# Patient Record
Sex: MALE | Race: WHITE | Employment: OTHER | URBAN - METROPOLITAN AREA
[De-identification: names, ages, dates, MRNs, and addresses within clinical notes are randomized per-mention and may not be internally consistent; named-entity substitution may affect disease eponyms.]

---

## 2017-01-03 PROCEDURE — 81003 URINALYSIS AUTO W/O SCOPE: CPT | Performed by: INTERNAL MEDICINE

## 2017-04-28 ENCOUNTER — OFFICE VISIT (OUTPATIENT)
Dept: FAMILY MEDICINE CLINIC | Facility: CLINIC | Age: 72
End: 2017-04-28

## 2017-04-28 VITALS
HEIGHT: 70 IN | WEIGHT: 213 LBS | OXYGEN SATURATION: 98 % | HEART RATE: 68 BPM | BODY MASS INDEX: 30.49 KG/M2 | DIASTOLIC BLOOD PRESSURE: 68 MMHG | TEMPERATURE: 98 F | SYSTOLIC BLOOD PRESSURE: 122 MMHG | RESPIRATION RATE: 17 BRPM

## 2017-04-28 DIAGNOSIS — R05.9 COUGH: ICD-10-CM

## 2017-04-28 DIAGNOSIS — J01.00 ACUTE NON-RECURRENT MAXILLARY SINUSITIS: Primary | ICD-10-CM

## 2017-04-28 PROCEDURE — 99202 OFFICE O/P NEW SF 15 MIN: CPT | Performed by: FAMILY MEDICINE

## 2017-04-28 RX ORDER — PROMETHAZINE HYDROCHLORIDE AND CODEINE PHOSPHATE 6.25; 1 MG/5ML; MG/5ML
5 SYRUP ORAL EVERY 4 HOURS PRN
Qty: 120 ML | Refills: 0 | Status: SHIPPED | OUTPATIENT
Start: 2017-04-28 | End: 2017-07-10 | Stop reason: ALTCHOICE

## 2017-04-28 RX ORDER — AMOXICILLIN AND CLAVULANATE POTASSIUM 875; 125 MG/1; MG/1
1 TABLET, FILM COATED ORAL 2 TIMES DAILY
Qty: 20 TABLET | Refills: 0 | Status: SHIPPED | OUTPATIENT
Start: 2017-04-28 | End: 2017-05-08

## 2017-04-28 NOTE — PATIENT INSTRUCTIONS
Take antibiotics with food and plenty of water. Eat yogurt or take probiotic daily. (Eladio Soto is a good example of an OTC probiotic)  Make sure to finish the entire antibiotic treatment.     Take promethazine with codeine syrup as needed for cough-- 1 tsp ev

## 2017-04-28 NOTE — PROGRESS NOTES
CHIEF COMPLAINT:   Patient presents with:  Cough: x 1 wk tried OTC and it is not helping       HPI:   Madalyn Sierra is a 80year old male who presents for sinus congestion and persistent cough for  1  weeks. Symptoms have been worsening since onset.  Cough GENERAL: feels well otherwise, no unplanned weight change,  normal appetite  SKIN: no rashes or abnormal skin lesions  HEENT: See HPI.     LUNGS: denies shortness of breath or wheezing, See HPI  CARDIOVASCULAR: denies chest pain or palpitations   GI: honorio probiotic)  Make sure to finish the entire antibiotic treatment. Take promethazine with codeine syrup as needed for cough-- 1 tsp every 4-6 hours. May cause sedation, so use with caution.    Consider Delsym OTC for an alternative cough suppressant that d

## 2017-07-11 ENCOUNTER — HOSPITAL ENCOUNTER (OUTPATIENT)
Dept: INTERVENTIONAL RADIOLOGY/VASCULAR | Facility: HOSPITAL | Age: 72
Discharge: HOME | End: 2017-07-12
Attending: INTERNAL MEDICINE | Admitting: INTERNAL MEDICINE
Payer: MEDICARE

## 2017-07-11 DIAGNOSIS — I10 ESSENTIAL HYPERTENSION: ICD-10-CM

## 2017-07-11 DIAGNOSIS — R94.39 ABNORMAL STRESS TEST: ICD-10-CM

## 2017-07-11 DIAGNOSIS — Z95.1 S/P CABG X 4: ICD-10-CM

## 2017-07-11 LAB
ATRIAL RATE: 65 BPM
P AXIS: 42 DEGREES
P-R INTERVAL: 190 MS
Q-T INTERVAL: 402 MS
QRS DURATION: 88 MS
QTC CALCULATION (BEZET): 418 MS
R AXIS: 20 DEGREES
T AXIS: 112 DEGREES
VENTRICULAR RATE: 65 BPM

## 2017-07-11 PROCEDURE — 027034Z DILATION OF CORONARY ARTERY, ONE ARTERY WITH DRUG-ELUTING INTRALUMINAL DEVICE, PERCUTANEOUS APPROACH: ICD-10-PCS | Performed by: INTERNAL MEDICINE

## 2017-07-11 PROCEDURE — 4A023N7 MEASUREMENT OF CARDIAC SAMPLING AND PRESSURE, LEFT HEART, PERCUTANEOUS APPROACH: ICD-10-PCS | Performed by: INTERNAL MEDICINE

## 2017-07-11 PROCEDURE — B2131ZZ FLUOROSCOPY OF MULTIPLE CORONARY ARTERY BYPASS GRAFTS USING LOW OSMOLAR CONTRAST: ICD-10-PCS | Performed by: INTERNAL MEDICINE

## 2017-07-11 PROCEDURE — 99156 MOD SED OTH PHYS/QHP 5/>YRS: CPT

## 2017-07-11 PROCEDURE — 93010 ELECTROCARDIOGRAM REPORT: CPT | Performed by: INTERNAL MEDICINE

## 2017-07-11 PROCEDURE — 99153 MOD SED SAME PHYS/QHP EA: CPT

## 2017-07-11 PROCEDURE — 93459 L HRT ART/GRFT ANGIO: CPT

## 2017-07-11 PROCEDURE — 99152 MOD SED SAME PHYS/QHP 5/>YRS: CPT

## 2017-07-11 PROCEDURE — 99157 MOD SED OTHER PHYS/QHP EA: CPT

## 2017-07-11 PROCEDURE — 93005 ELECTROCARDIOGRAM TRACING: CPT

## 2017-07-11 PROCEDURE — B2151ZZ FLUOROSCOPY OF LEFT HEART USING LOW OSMOLAR CONTRAST: ICD-10-PCS | Performed by: INTERNAL MEDICINE

## 2017-07-11 PROCEDURE — 3E033PZ INTRODUCTION OF PLATELET INHIBITOR INTO PERIPHERAL VEIN, PERCUTANEOUS APPROACH: ICD-10-PCS | Performed by: INTERNAL MEDICINE

## 2017-07-11 PROCEDURE — B2111ZZ FLUOROSCOPY OF MULTIPLE CORONARY ARTERIES USING LOW OSMOLAR CONTRAST: ICD-10-PCS | Performed by: INTERNAL MEDICINE

## 2017-07-11 PROCEDURE — B2181ZZ FLUOROSCOPY OF LEFT INTERNAL MAMMARY BYPASS GRAFT USING LOW OSMOLAR CONTRAST: ICD-10-PCS | Performed by: INTERNAL MEDICINE

## 2017-07-11 RX ORDER — LIDOCAINE HYDROCHLORIDE 10 MG/ML
INJECTION, SOLUTION INFILTRATION; PERINEURAL
Status: COMPLETED
Start: 2017-07-11 | End: 2017-07-11

## 2017-07-11 RX ORDER — SODIUM CHLORIDE 9 MG/ML
INJECTION, SOLUTION INTRAVENOUS CONTINUOUS
Status: ACTIVE | OUTPATIENT
Start: 2017-07-11 | End: 2017-07-11

## 2017-07-11 RX ORDER — ASPIRIN 81 MG/1
81 TABLET ORAL DAILY
Status: DISCONTINUED | OUTPATIENT
Start: 2017-07-12 | End: 2017-07-12

## 2017-07-11 RX ORDER — MIDAZOLAM HYDROCHLORIDE 1 MG/ML
INJECTION INTRAMUSCULAR; INTRAVENOUS
Status: COMPLETED
Start: 2017-07-11 | End: 2017-07-11

## 2017-07-11 RX ORDER — HEPARIN SODIUM 5000 [USP'U]/ML
INJECTION, SOLUTION INTRAVENOUS; SUBCUTANEOUS
Status: COMPLETED
Start: 2017-07-11 | End: 2017-07-11

## 2017-07-11 RX ORDER — SODIUM CHLORIDE 9 MG/ML
INJECTION, SOLUTION INTRAVENOUS CONTINUOUS
Status: DISCONTINUED | OUTPATIENT
Start: 2017-07-11 | End: 2017-07-11

## 2017-07-11 RX ORDER — ATORVASTATIN CALCIUM 20 MG/1
20 TABLET, FILM COATED ORAL NIGHTLY
Status: DISCONTINUED | OUTPATIENT
Start: 2017-07-11 | End: 2017-07-12

## 2017-07-11 RX ORDER — ACETAMINOPHEN 325 MG/1
650 TABLET ORAL EVERY 6 HOURS PRN
Status: DISCONTINUED | OUTPATIENT
Start: 2017-07-11 | End: 2017-07-12

## 2017-07-11 RX ORDER — ASPIRIN 81 MG/1
TABLET, CHEWABLE ORAL
Status: COMPLETED
Start: 2017-07-11 | End: 2017-07-11

## 2017-07-11 RX ORDER — METOPROLOL SUCCINATE 50 MG/1
50 TABLET, EXTENDED RELEASE ORAL
Status: DISCONTINUED | OUTPATIENT
Start: 2017-07-11 | End: 2017-07-12

## 2017-07-11 RX ORDER — CLOPIDOGREL BISULFATE 75 MG/1
TABLET ORAL
Status: COMPLETED
Start: 2017-07-11 | End: 2017-07-11

## 2017-07-11 RX ORDER — MAGNESIUM OXIDE 400 MG (241.3 MG MAGNESIUM) TABLET
1 TABLET NIGHTLY
Status: DISCONTINUED | OUTPATIENT
Start: 2017-07-11 | End: 2017-07-12

## 2017-07-11 RX ADMIN — SODIUM CHLORIDE: 9 INJECTION, SOLUTION INTRAVENOUS at 18:09:00

## 2017-07-11 RX ADMIN — SODIUM CHLORIDE: 9 INJECTION, SOLUTION INTRAVENOUS at 13:46:00

## 2017-07-11 RX ADMIN — SODIUM CHLORIDE: 9 INJECTION, SOLUTION INTRAVENOUS at 09:00:00

## 2017-07-11 RX ADMIN — SODIUM CHLORIDE: 9 INJECTION, SOLUTION INTRAVENOUS at 11:45:00

## 2017-07-11 RX ADMIN — MAGNESIUM OXIDE 400 MG (241.3 MG MAGNESIUM) TABLET 1 MG: TABLET at 21:10:00

## 2017-07-11 RX ADMIN — ATORVASTATIN CALCIUM 20 MG: 20 TABLET, FILM COATED ORAL at 21:10:00

## 2017-07-11 RX ADMIN — SODIUM CHLORIDE: 9 INJECTION, SOLUTION INTRAVENOUS at 16:02:00

## 2017-07-11 RX ADMIN — ACETAMINOPHEN 650 MG: 325 TABLET ORAL at 21:15:00

## 2017-07-11 NOTE — PROCEDURES
[facility]    PATIENT'S NAME: Sun Sal   ATTENDING PHYSICIAN: Hiren Walsh M.D. OPERATING PHYSICIAN: Hiren Walsh M.D.    PATIENT ACCOUNT#:   [de-identified]    LOCATION:  [address] [place] [address]  MEDICAL RECORD #:   [id number]       DATE OF MAGNUS anastomosis. 7.   Saphenous vein graft to the left circumflex obtuse marginal.  Again, at the origin of this graft, there appears to be a moderate to high-grade disease. The anastomosis looks pristine with retrograde filling of the circumflex.   There is Dictated By Tano Ardon M.D.  d: 07/11/2017 10:55:37  t: 07/11/2017 12:58:10  Job [id number]  [place]/

## 2017-07-11 NOTE — PROCEDURES
[facility]    PATIENT'S NAME: Trina Caitie   ATTENDING PHYSICIAN: Miguel Beyer M.D. OPERATING PHYSICIAN: Brandi Blancas M.D.    PATIENT ACCOUNT#:   [de-identified]    LOCATION:  [address] [place] [address]  MEDICAL RECORD #:   [id number]       DATE OF

## 2017-07-11 NOTE — DIETARY NOTE
Nutrition Short Note   Dietitian consult received per cardiac rehab standing order. Pt to be educated by cardiac rehab staff and encouraged to attend outpatient classes taught by RD. RD available PRN.      Gavino Pedraza RD, LDN  Pager [phone]

## 2017-07-11 NOTE — PROCEDURES
%  [facility]      Procedure:  DEUCE RCA SVG, ANGIOMAX, PERCLOSE RFA          RESULTS: PTCA/STENT RCA SVG, PROX, , 90%% TO 0% WITH 4.0 X 22 MM JENNIFER DEUCE    PLAN: PCI OM SVG IN 2 WEEKS

## 2017-07-12 VITALS
RESPIRATION RATE: 20 BRPM | SYSTOLIC BLOOD PRESSURE: 121 MMHG | WEIGHT: 205 LBS | TEMPERATURE: 98 F | HEART RATE: 77 BPM | BODY MASS INDEX: 29.35 KG/M2 | HEIGHT: 70 IN | DIASTOLIC BLOOD PRESSURE: 77 MMHG | OXYGEN SATURATION: 95 %

## 2017-07-12 LAB
ATRIAL RATE: 68 BPM
BASOPHILS # BLD AUTO: 0.01 X10(3) UL (ref 0–0.1)
BASOPHILS NFR BLD AUTO: 0.2 %
BUN BLD-MCNC: 20 MG/DL (ref 8–20)
CALCIUM BLD-MCNC: 8.3 MG/DL (ref 8.3–10.3)
CHLORIDE: 109 MMOL/L (ref 101–111)
CO2: 25 MMOL/L (ref 22–32)
CREAT BLD-MCNC: 1.02 MG/DL (ref 0.7–1.3)
EOSINOPHIL # BLD AUTO: 0.18 X10(3) UL (ref 0–0.3)
EOSINOPHIL NFR BLD AUTO: 3.3 %
ERYTHROCYTE [DISTWIDTH] IN BLOOD BY AUTOMATED COUNT: 16.1 % (ref 11.5–16)
GLUCOSE BLD-MCNC: 107 MG/DL (ref 70–99)
HCT VFR BLD AUTO: 34.8 % (ref 37–53)
HGB BLD-MCNC: 10.3 G/DL (ref 13–17)
IMMATURE GRANULOCYTE COUNT: 0.01 X10(3) UL (ref 0–1)
IMMATURE GRANULOCYTE RATIO %: 0.2 %
LYMPHOCYTES # BLD AUTO: 0.94 X10(3) UL (ref 0.9–4)
LYMPHOCYTES NFR BLD AUTO: 17 %
MCH RBC QN AUTO: 22.3 PG (ref 27–33.2)
MCHC RBC AUTO-ENTMCNC: 29.6 G/DL (ref 31–37)
MCV RBC AUTO: 75.3 FL (ref 80–99)
MONOCYTES # BLD AUTO: 0.56 X10(3) UL (ref 0.1–0.6)
MONOCYTES NFR BLD AUTO: 10.1 %
NEUTROPHIL ABS PRELIM: 3.83 X10 (3) UL (ref 1.3–6.7)
NEUTROPHILS # BLD AUTO: 3.83 X10(3) UL (ref 1.3–6.7)
NEUTROPHILS NFR BLD AUTO: 69.2 %
P AXIS: 27 DEGREES
P-R INTERVAL: 188 MS
PLATELET # BLD AUTO: 179 10(3)UL (ref 150–450)
POTASSIUM SERPL-SCNC: 4.1 MMOL/L (ref 3.6–5.1)
Q-T INTERVAL: 416 MS
QRS DURATION: 90 MS
QTC CALCULATION (BEZET): 442 MS
R AXIS: 15 DEGREES
RBC # BLD AUTO: 4.62 X10(6)UL (ref 3.8–5.8)
RED CELL DISTRIBUTION WIDTH-SD: 43.7 FL (ref 35.1–46.3)
SODIUM SERPL-SCNC: 141 MMOL/L (ref 136–144)
T AXIS: -75 DEGREES
VENTRICULAR RATE: 68 BPM
WBC # BLD AUTO: 5.5 X10(3) UL (ref 4–13)

## 2017-07-12 PROCEDURE — 93005 ELECTROCARDIOGRAM TRACING: CPT

## 2017-07-12 PROCEDURE — 93010 ELECTROCARDIOGRAM REPORT: CPT | Performed by: INTERNAL MEDICINE

## 2017-07-12 PROCEDURE — 80048 BASIC METABOLIC PNL TOTAL CA: CPT | Performed by: INTERNAL MEDICINE

## 2017-07-12 PROCEDURE — 85025 COMPLETE CBC W/AUTO DIFF WBC: CPT | Performed by: INTERNAL MEDICINE

## 2017-07-12 RX ORDER — ASPIRIN 81 MG/1
81 TABLET ORAL DAILY
Qty: 30 TABLET | Refills: 3 | Status: SHIPPED | OUTPATIENT
Start: 2017-07-12

## 2017-07-12 RX ADMIN — ASPIRIN 81 MG: 81 TABLET ORAL at 09:42:00

## 2017-07-12 RX ADMIN — ACETAMINOPHEN 650 MG: 325 TABLET ORAL at 03:33:00

## 2017-07-12 RX ADMIN — ACETAMINOPHEN 650 MG: 325 TABLET ORAL at 09:42:00

## 2017-07-12 RX ADMIN — METOPROLOL SUCCINATE 50 MG: 50 TABLET, EXTENDED RELEASE ORAL at 04:59:00

## 2017-07-12 NOTE — PLAN OF CARE
Problem: Patient/Family Goals  Goal: Patient/Family Long Term Goal  Patient's Long Term Goal: to return home. Interventions: left heart cath, IVF, cardiology consult.   - See additional Care Plan goals for specific interventions    Outcome: Progressing

## 2017-07-12 NOTE — PROGRESS NOTES
[facility] Progress Note         Patient Status:  Outpatient in a Bed    1945 MRN [id number]   [facility] 2NE-A Attending Lynne Portillo MD   Hosp Day # 0 PCP Unknown Pcp     Subject input(s): PTP, INR in the last 72 hours. Impression:  1. CAD - CABG X 4 in 2002. LIMA - LAD, SVG - OM, SVG - sequential to distal RCA and PLVB of RCA. He has some exertional fatigue which he attributes to weight and aging.   His cardiolite 6/13

## 2017-07-12 NOTE — PROGRESS NOTES
NURSING DISCHARGE NOTE    Received orders to discharge pt. Instructed pt on prescribed medication and provided educational handout. No prescriptions (med available OTC). Instructed pt on follow-up appointment per MD order.  Provided home care instructio

## 2017-07-12 NOTE — PLAN OF CARE
CARDIOVASCULAR - ADULT    • Maintains optimal cardiac output and hemodynamic stability Progressing    • Absence of cardiac arrhythmias or at baseline Progressing          Assumed care of pt at 0730. C/o headache 4/10. Tylenol helps.  Denies chest pain or SO

## 2017-07-12 NOTE — PLAN OF CARE
DX: Abnormal Stress Test S/P Successful angioplasty and stenting of the right coronary artery vein graft - plan is for [place] to [place] in 2 [place] [facility] [place] Stacie Valerio 7/11 procedure note    Data:  Pt lying in bed.   Pt c/o 4/10 soreness to

## 2017-07-28 ENCOUNTER — HOSPITAL ENCOUNTER (OUTPATIENT)
Dept: INTERVENTIONAL RADIOLOGY/VASCULAR | Facility: HOSPITAL | Age: 72
Discharge: HOME | End: 2017-07-29
Attending: INTERNAL MEDICINE | Admitting: INTERNAL MEDICINE
Payer: MEDICARE

## 2017-07-28 DIAGNOSIS — I25.10 CAD (CORONARY ARTERY DISEASE): ICD-10-CM

## 2017-07-28 DIAGNOSIS — R94.39 ABNORMAL STRESS TEST: ICD-10-CM

## 2017-07-28 PROCEDURE — 99153 MOD SED SAME PHYS/QHP EA: CPT

## 2017-07-28 PROCEDURE — 93005 ELECTROCARDIOGRAM TRACING: CPT

## 2017-07-28 PROCEDURE — 99152 MOD SED SAME PHYS/QHP 5/>YRS: CPT

## 2017-07-28 PROCEDURE — 027034Z DILATION OF CORONARY ARTERY, ONE ARTERY WITH DRUG-ELUTING INTRALUMINAL DEVICE, PERCUTANEOUS APPROACH: ICD-10-PCS | Performed by: INTERNAL MEDICINE

## 2017-07-28 PROCEDURE — 93010 ELECTROCARDIOGRAM REPORT: CPT | Performed by: INTERNAL MEDICINE

## 2017-07-28 RX ORDER — MIDAZOLAM HYDROCHLORIDE 1 MG/ML
INJECTION INTRAMUSCULAR; INTRAVENOUS
Status: COMPLETED
Start: 2017-07-28 | End: 2017-07-28

## 2017-07-28 RX ORDER — HEPARIN SODIUM 5000 [USP'U]/ML
INJECTION, SOLUTION INTRAVENOUS; SUBCUTANEOUS
Status: COMPLETED
Start: 2017-07-28 | End: 2017-07-28

## 2017-07-28 RX ORDER — SODIUM CHLORIDE 9 MG/ML
INJECTION, SOLUTION INTRAVENOUS CONTINUOUS
Status: ACTIVE | OUTPATIENT
Start: 2017-07-28 | End: 2017-07-28

## 2017-07-28 RX ORDER — LIDOCAINE HYDROCHLORIDE 10 MG/ML
INJECTION, SOLUTION INFILTRATION; PERINEURAL
Status: COMPLETED
Start: 2017-07-28 | End: 2017-07-28

## 2017-07-28 RX ORDER — ASPIRIN 81 MG/1
81 TABLET, CHEWABLE ORAL DAILY
Status: DISCONTINUED | OUTPATIENT
Start: 2017-07-28 | End: 2017-07-29

## 2017-07-28 RX ORDER — SODIUM CHLORIDE 9 MG/ML
INJECTION, SOLUTION INTRAVENOUS CONTINUOUS
Status: DISCONTINUED | OUTPATIENT
Start: 2017-07-28 | End: 2017-07-29

## 2017-07-28 RX ORDER — ASPIRIN 81 MG/1
324 TABLET, CHEWABLE ORAL ONCE
Status: DISCONTINUED | OUTPATIENT
Start: 2017-07-28 | End: 2017-07-28 | Stop reason: HOSPADM

## 2017-07-28 RX ORDER — TEMAZEPAM 15 MG/1
15 CAPSULE ORAL ONCE
Status: COMPLETED | OUTPATIENT
Start: 2017-07-28 | End: 2017-07-28

## 2017-07-28 RX ORDER — ATORVASTATIN CALCIUM 40 MG/1
40 TABLET, FILM COATED ORAL NIGHTLY
Status: DISCONTINUED | OUTPATIENT
Start: 2017-07-28 | End: 2017-07-29

## 2017-07-28 RX ORDER — ACETAMINOPHEN 325 MG/1
650 TABLET ORAL EVERY 6 HOURS PRN
Status: DISCONTINUED | OUTPATIENT
Start: 2017-07-28 | End: 2017-07-29

## 2017-07-28 RX ORDER — CLOPIDOGREL BISULFATE 75 MG/1
TABLET ORAL
Status: COMPLETED
Start: 2017-07-28 | End: 2017-07-28

## 2017-07-28 RX ORDER — CLOPIDOGREL BISULFATE 75 MG/1
75 TABLET ORAL DAILY
Status: DISCONTINUED | OUTPATIENT
Start: 2017-07-29 | End: 2017-07-29

## 2017-07-28 RX ORDER — CLOPIDOGREL BISULFATE 75 MG/1
75 TABLET ORAL DAILY
Status: DISCONTINUED | OUTPATIENT
Start: 2017-07-29 | End: 2017-07-28

## 2017-07-28 RX ORDER — ACETAMINOPHEN 325 MG/1
TABLET ORAL
Status: DISPENSED
Start: 2017-07-28 | End: 2017-07-29

## 2017-07-28 RX ORDER — METOPROLOL SUCCINATE 50 MG/1
50 TABLET, EXTENDED RELEASE ORAL
Status: DISCONTINUED | OUTPATIENT
Start: 2017-07-29 | End: 2017-07-29

## 2017-07-28 RX ADMIN — SODIUM CHLORIDE: 9 INJECTION, SOLUTION INTRAVENOUS at 09:30:00

## 2017-07-28 RX ADMIN — ACETAMINOPHEN 625 MG: 325 TABLET ORAL at 14:35:00

## 2017-07-28 RX ADMIN — SODIUM CHLORIDE: 9 INJECTION, SOLUTION INTRAVENOUS at 13:00:00

## 2017-07-28 RX ADMIN — TEMAZEPAM 15 MG: 15 CAPSULE ORAL at 22:23:00

## 2017-07-28 NOTE — PROCEDURES
[facility]    PATIENT'S NAME: Anthony Ramos   ATTENDING PHYSICIAN: Abelardo Gomes M.D. OPERATING PHYSICIAN: Areta Schlatter, M.D.    PATIENT ACCOUNT#:   [de-identified]    LOCATION:  [address] [place] [address]  MEDICAL RECORD #:   [id number]       DATE OF using a Perclose device. FINDINGS:  Angiography of the RCA vein graft demonstrates that the proximal vein graft stent is widely patent. PERCUTANEOUS TRANSLUMINAL CORONARY ANGIOPLASTY:  The vessel dilated was the ostium of the vein graft to the OM.   Francisco Rojo

## 2017-07-28 NOTE — H&P
Brief period of chest burning post previous RCA-SVG PCI of 7/14/17. Patient denies chest pain and dyspnea.      Plan for PCI OM graft

## 2017-07-28 NOTE — PROCEDURES
PROCEDURE: ANGIO RCA SVG, PCI OM-SVG OSTIUM, ANGIOMAX, PERCLOSE RFA SITE    RESULTS:    WIDELY PATENT RCA GRAFT STENT  PTCA/DEUCE OM SVG OSTIUM, 99%--->0% WITH 3.5 X 20 MM SYNERGY DEUCE , FURTHERED DILATED TO 4.6 MM

## 2017-07-29 VITALS
HEIGHT: 70 IN | HEART RATE: 73 BPM | BODY MASS INDEX: 30.06 KG/M2 | OXYGEN SATURATION: 96 % | RESPIRATION RATE: 18 BRPM | SYSTOLIC BLOOD PRESSURE: 119 MMHG | TEMPERATURE: 98 F | WEIGHT: 210 LBS | DIASTOLIC BLOOD PRESSURE: 73 MMHG

## 2017-07-29 LAB
BASOPHILS # BLD AUTO: 0.02 X10(3) UL (ref 0–0.1)
BASOPHILS NFR BLD AUTO: 0.4 %
BUN BLD-MCNC: 16 MG/DL (ref 8–20)
CALCIUM BLD-MCNC: 8.7 MG/DL (ref 8.3–10.3)
CHLORIDE: 109 MMOL/L (ref 101–111)
CO2: 25 MMOL/L (ref 22–32)
CREAT BLD-MCNC: 1.04 MG/DL (ref 0.7–1.3)
EOSINOPHIL # BLD AUTO: 0.15 X10(3) UL (ref 0–0.3)
EOSINOPHIL NFR BLD AUTO: 3.2 %
ERYTHROCYTE [DISTWIDTH] IN BLOOD BY AUTOMATED COUNT: 16 % (ref 11.5–16)
GLUCOSE BLD-MCNC: 99 MG/DL (ref 70–99)
HCT VFR BLD AUTO: 36.4 % (ref 37–53)
HGB BLD-MCNC: 10.9 G/DL (ref 13–17)
IMMATURE GRANULOCYTE COUNT: 0.02 X10(3) UL (ref 0–1)
IMMATURE GRANULOCYTE RATIO %: 0.4 %
LYMPHOCYTES # BLD AUTO: 0.97 X10(3) UL (ref 0.9–4)
LYMPHOCYTES NFR BLD AUTO: 20.7 %
MCH RBC QN AUTO: 22.4 PG (ref 27–33.2)
MCHC RBC AUTO-ENTMCNC: 29.9 G/DL (ref 31–37)
MCV RBC AUTO: 74.7 FL (ref 80–99)
MONOCYTES # BLD AUTO: 0.41 X10(3) UL (ref 0.1–0.6)
MONOCYTES NFR BLD AUTO: 8.8 %
NEUTROPHIL ABS PRELIM: 3.11 X10 (3) UL (ref 1.3–6.7)
NEUTROPHILS # BLD AUTO: 3.11 X10(3) UL (ref 1.3–6.7)
NEUTROPHILS NFR BLD AUTO: 66.5 %
PLATELET # BLD AUTO: 189 10(3)UL (ref 150–450)
POTASSIUM SERPL-SCNC: 3.9 MMOL/L (ref 3.6–5.1)
RBC # BLD AUTO: 4.87 X10(6)UL (ref 3.8–5.8)
RED CELL DISTRIBUTION WIDTH-SD: 42.7 FL (ref 35.1–46.3)
SODIUM SERPL-SCNC: 141 MMOL/L (ref 136–144)
WBC # BLD AUTO: 4.7 X10(3) UL (ref 4–13)

## 2017-07-29 PROCEDURE — 85025 COMPLETE CBC W/AUTO DIFF WBC: CPT | Performed by: INTERNAL MEDICINE

## 2017-07-29 PROCEDURE — 80048 BASIC METABOLIC PNL TOTAL CA: CPT | Performed by: INTERNAL MEDICINE

## 2017-07-29 RX ADMIN — CLOPIDOGREL BISULFATE 75 MG: 75 TABLET ORAL at 09:20:00

## 2017-07-29 RX ADMIN — ATORVASTATIN CALCIUM 40 MG: 40 TABLET, FILM COATED ORAL at 09:19:00

## 2017-07-29 RX ADMIN — ACETAMINOPHEN 650 MG: 325 TABLET ORAL at 09:19:00

## 2017-07-29 RX ADMIN — METOPROLOL SUCCINATE 50 MG: 50 TABLET, EXTENDED RELEASE ORAL at 06:24:00

## 2017-07-29 RX ADMIN — ASPIRIN 81 MG: 81 TABLET, CHEWABLE ORAL at 09:20:00

## 2017-07-29 RX ADMIN — ACETAMINOPHEN 650 MG: 325 TABLET ORAL at 02:37:00

## 2017-07-29 NOTE — PROGRESS NOTES
[facility] Cardiology Progress Note        Jose Antonio Valdez Patient Status:  Outpatient in a Bed    1945 MRN [id number]   [facility] 8NE-A Attending Rosana Bryant MD   Hosp Day # 0 PCP Unknown Pcp     Subject hours. No results for input(s): PTP, INR in the last 72 hours. Impression:  1. CAD - CABG X 4 in 2002. LIMA - LAD, SVG - OM, SVG - sequential to distal RCA and PLVB of RCA. --- s/p DEUCE RCA SVG.   WIDELY PATENT RCA GRAFT STENT  PTCA/DEUCE OM SV

## 2017-07-29 NOTE — DIETARY NOTE
Nutrition Short Note   Dietitian consult received per cardiac rehab standing order. Pt to be educated by cardiac rehab staff and encouraged to attend outpatient classes taught by RD. RD available PRN.      Jodie Rodriguez RD, LDN  Pager [phone]

## 2017-07-31 LAB
ATRIAL RATE: 62 BPM
P AXIS: 19 DEGREES
P-R INTERVAL: 190 MS
Q-T INTERVAL: 440 MS
QRS DURATION: 98 MS
QTC CALCULATION (BEZET): 446 MS
R AXIS: 26 DEGREES
T AXIS: -39 DEGREES
VENTRICULAR RATE: 62 BPM

## 2017-08-14 ENCOUNTER — OFFICE VISIT (OUTPATIENT)
Dept: INTERNAL MEDICINE CLINIC | Facility: CLINIC | Age: 72
End: 2017-08-14

## 2017-08-14 ENCOUNTER — LAB ENCOUNTER (OUTPATIENT)
Dept: LAB | Age: 72
End: 2017-08-14
Attending: INTERNAL MEDICINE
Payer: MEDICARE

## 2017-08-14 VITALS
SYSTOLIC BLOOD PRESSURE: 110 MMHG | HEART RATE: 68 BPM | OXYGEN SATURATION: 95 % | WEIGHT: 213 LBS | BODY MASS INDEX: 30.49 KG/M2 | RESPIRATION RATE: 16 BRPM | TEMPERATURE: 99 F | DIASTOLIC BLOOD PRESSURE: 80 MMHG | HEIGHT: 70 IN

## 2017-08-14 DIAGNOSIS — I10 ESSENTIAL HYPERTENSION: ICD-10-CM

## 2017-08-14 DIAGNOSIS — I25.10 CORONARY ARTERY DISEASE INVOLVING NATIVE CORONARY ARTERY OF NATIVE HEART WITHOUT ANGINA PECTORIS: ICD-10-CM

## 2017-08-14 DIAGNOSIS — E78.2 MIXED HYPERLIPIDEMIA: ICD-10-CM

## 2017-08-14 DIAGNOSIS — Z13.31 DEPRESSION SCREENING: ICD-10-CM

## 2017-08-14 DIAGNOSIS — Z00.00 ENCOUNTER FOR ANNUAL HEALTH EXAMINATION: ICD-10-CM

## 2017-08-14 DIAGNOSIS — Z13.39 SCREENING FOR ALCOHOL PROBLEM: ICD-10-CM

## 2017-08-14 DIAGNOSIS — E03.9 HYPOTHYROIDISM, UNSPECIFIED TYPE: ICD-10-CM

## 2017-08-14 DIAGNOSIS — Z95.1 S/P CABG X 4: ICD-10-CM

## 2017-08-14 DIAGNOSIS — Z12.11 COLON CANCER SCREENING: ICD-10-CM

## 2017-08-14 DIAGNOSIS — Z00.00 PHYSICAL EXAM, ANNUAL: Primary | ICD-10-CM

## 2017-08-14 DIAGNOSIS — Z01.84 IMMUNITY STATUS TESTING: ICD-10-CM

## 2017-08-14 DIAGNOSIS — E06.3 HYPOTHYROIDISM DUE TO HASHIMOTO'S THYROIDITIS: ICD-10-CM

## 2017-08-14 DIAGNOSIS — Z13.6 SCREENING FOR CARDIOVASCULAR CONDITION: ICD-10-CM

## 2017-08-14 DIAGNOSIS — E03.8 HYPOTHYROIDISM DUE TO HASHIMOTO'S THYROIDITIS: ICD-10-CM

## 2017-08-14 DIAGNOSIS — Z23 NEED FOR PNEUMOCOCCAL VACCINATION: ICD-10-CM

## 2017-08-14 DIAGNOSIS — D64.9 ANEMIA, UNSPECIFIED TYPE: ICD-10-CM

## 2017-08-14 PROBLEM — R94.39 ABNORMAL STRESS TEST: Status: RESOLVED | Noted: 2017-07-28 | Resolved: 2017-08-14

## 2017-08-14 LAB
ALBUMIN SERPL-MCNC: 4.1 G/DL (ref 3.5–4.8)
ALP LIVER SERPL-CCNC: 88 U/L (ref 45–117)
ALT SERPL-CCNC: 24 U/L (ref 17–63)
AST SERPL-CCNC: 17 U/L (ref 15–41)
BASOPHILS # BLD AUTO: 0.03 X10(3) UL (ref 0–0.1)
BASOPHILS NFR BLD AUTO: 0.5 %
BILIRUB SERPL-MCNC: 0.6 MG/DL (ref 0.1–2)
BILIRUB UR QL STRIP.AUTO: NEGATIVE
BUN BLD-MCNC: 22 MG/DL (ref 8–20)
CALCIUM BLD-MCNC: 9.2 MG/DL (ref 8.3–10.3)
CHLORIDE: 109 MMOL/L (ref 101–111)
CHOLEST SMN-MCNC: 126 MG/DL (ref ?–200)
CLARITY UR REFRACT.AUTO: CLEAR
CO2: 27 MMOL/L (ref 22–32)
COLOR UR AUTO: YELLOW
CREAT BLD-MCNC: 1.19 MG/DL (ref 0.7–1.3)
EOSINOPHIL # BLD AUTO: 0.2 X10(3) UL (ref 0–0.3)
EOSINOPHIL NFR BLD AUTO: 3.6 %
ERYTHROCYTE [DISTWIDTH] IN BLOOD BY AUTOMATED COUNT: 16 % (ref 11.5–16)
GLUCOSE BLD-MCNC: 113 MG/DL (ref 70–99)
GLUCOSE UR STRIP.AUTO-MCNC: NEGATIVE MG/DL
HCT VFR BLD AUTO: 41.1 % (ref 37–53)
HDLC SERPL-MCNC: 34 MG/DL (ref 45–?)
HDLC SERPL: 3.71 {RATIO} (ref ?–4.97)
HEPATITIS C VIRUS AB INTERPRETATION: NONREACTIVE
HGB BLD-MCNC: 11.7 G/DL (ref 13–17)
IMMATURE GRANULOCYTE COUNT: 0.03 X10(3) UL (ref 0–1)
IMMATURE GRANULOCYTE RATIO %: 0.5 %
KETONES UR STRIP.AUTO-MCNC: NEGATIVE MG/DL
LDLC SERPL CALC-MCNC: 58 MG/DL (ref ?–130)
LDLC SERPL-MCNC: 34 MG/DL (ref 5–40)
LEUKOCYTE ESTERASE UR QL STRIP.AUTO: NEGATIVE
LYMPHOCYTES # BLD AUTO: 1.09 X10(3) UL (ref 0.9–4)
LYMPHOCYTES NFR BLD AUTO: 19.8 %
M PROTEIN MFR SERPL ELPH: 7.1 G/DL (ref 6.1–8.3)
MCH RBC QN AUTO: 21.9 PG (ref 27–33.2)
MCHC RBC AUTO-ENTMCNC: 28.5 G/DL (ref 31–37)
MCV RBC AUTO: 76.8 FL (ref 80–99)
MONOCYTES # BLD AUTO: 0.63 X10(3) UL (ref 0.1–0.6)
MONOCYTES NFR BLD AUTO: 11.5 %
NEUTROPHIL ABS PRELIM: 3.52 X10 (3) UL (ref 1.3–6.7)
NEUTROPHILS # BLD AUTO: 3.52 X10(3) UL (ref 1.3–6.7)
NEUTROPHILS NFR BLD AUTO: 64.1 %
NITRITE UR QL STRIP.AUTO: NEGATIVE
NONHDLC SERPL-MCNC: 92 MG/DL (ref ?–130)
PH UR STRIP.AUTO: 6 [PH] (ref 4.5–8)
PLATELET # BLD AUTO: 242 10(3)UL (ref 150–450)
POTASSIUM SERPL-SCNC: 4.6 MMOL/L (ref 3.6–5.1)
PROT UR STRIP.AUTO-MCNC: NEGATIVE MG/DL
RBC # BLD AUTO: 5.35 X10(6)UL (ref 3.8–5.8)
RBC UR QL AUTO: NEGATIVE
RED CELL DISTRIBUTION WIDTH-SD: 43.8 FL (ref 35.1–46.3)
SODIUM SERPL-SCNC: 143 MMOL/L (ref 136–144)
SP GR UR STRIP.AUTO: 1.02 (ref 1–1.03)
TRIGLYCERIDES: 170 MG/DL (ref ?–150)
UROBILINOGEN UR STRIP.AUTO-MCNC: <2 MG/DL
WBC # BLD AUTO: 5.5 X10(3) UL (ref 4–13)

## 2017-08-14 PROCEDURE — 36415 COLL VENOUS BLD VENIPUNCTURE: CPT

## 2017-08-14 PROCEDURE — 81003 URINALYSIS AUTO W/O SCOPE: CPT

## 2017-08-14 PROCEDURE — G0444 DEPRESSION SCREEN ANNUAL: HCPCS | Performed by: INTERNAL MEDICINE

## 2017-08-14 PROCEDURE — 83550 IRON BINDING TEST: CPT

## 2017-08-14 PROCEDURE — 83540 ASSAY OF IRON: CPT

## 2017-08-14 PROCEDURE — 86803 HEPATITIS C AB TEST: CPT

## 2017-08-14 PROCEDURE — G0439 PPPS, SUBSEQ VISIT: HCPCS | Performed by: INTERNAL MEDICINE

## 2017-08-14 PROCEDURE — 82728 ASSAY OF FERRITIN: CPT

## 2017-08-14 PROCEDURE — 85025 COMPLETE CBC W/AUTO DIFF WBC: CPT

## 2017-08-14 PROCEDURE — 90732 PPSV23 VACC 2 YRS+ SUBQ/IM: CPT | Performed by: INTERNAL MEDICINE

## 2017-08-14 PROCEDURE — G0446 INTENS BEHAVE THER CARDIO DX: HCPCS | Performed by: INTERNAL MEDICINE

## 2017-08-14 PROCEDURE — 80061 LIPID PANEL: CPT

## 2017-08-14 PROCEDURE — 80053 COMPREHEN METABOLIC PANEL: CPT

## 2017-08-14 PROCEDURE — G0009 ADMIN PNEUMOCOCCAL VACCINE: HCPCS | Performed by: INTERNAL MEDICINE

## 2017-08-14 RX ORDER — METOPROLOL SUCCINATE 50 MG/1
50 TABLET, EXTENDED RELEASE ORAL 2 TIMES DAILY
Qty: 180 TABLET | Refills: 3 | Status: SHIPPED | OUTPATIENT
Start: 2017-08-14 | End: 2018-07-10

## 2017-08-14 RX ORDER — LEVOTHYROXINE SODIUM 137 UG/1
137 TABLET ORAL
Qty: 90 TABLET | Refills: 3 | Status: SHIPPED | OUTPATIENT
Start: 2017-08-14 | End: 2018-07-10

## 2017-08-14 RX ORDER — ATORVASTATIN CALCIUM 40 MG/1
40 TABLET, FILM COATED ORAL DAILY
Qty: 90 TABLET | Refills: 3 | Status: SHIPPED | OUTPATIENT
Start: 2017-08-14 | End: 2018-07-10

## 2017-08-14 RX ORDER — CLOPIDOGREL BISULFATE 75 MG/1
75 TABLET ORAL DAILY
Qty: 90 TABLET | Refills: 3 | Status: SHIPPED | OUTPATIENT
Start: 2017-08-14 | End: 2018-07-10

## 2017-08-14 NOTE — PROGRESS NOTES
HPI:   Nick Garvin is a 80year old male who presents for a Medicare Subsequent Annual Wellness visit (Pt already had Initial Annual Wellness).     HPI:  New pt to me  Here for physical and to establish  Hx of CAD s/p CABG x 4 in 2004. 2 weeks ago underw tablet (81 mg total) by mouth daily. MEDICAL INFORMATION:   He  has a past medical history of CAD (coronary artery disease); HTN (hypertension); Hyperlipidemia; Hypothyroid; Kidney stones; and S/P CABG x 4 (2002).     He  has a past surgical history th external ear canals, both ears   Nose: Nares normal, septum midline, mucosa normal, no drainage or sinus tenderness   Throat: Lips, mucosa, and tongue normal; teeth and gums normal   Neck: Supple, symmetrical, trachea midline, no adenopathy, thyroid: not e ROUTINE; Future  -     CBC WITH DIFFERENTIAL WITH PLATELET; Future  -     Metoprolol Succinate ER 50 MG Oral Tablet 24 Hr; Take 1 tablet (50 mg total) by mouth 2 (two) times daily.     Need for pneumococcal vaccination  -     PNEUMOCOCCAL IMM (PNEUMOVAX) would you describe your daily physical activity?: Moderate    How would you describe your current health state?: Good    How do you maintain positive mental well-being?: Visiting Friends; Visiting Family    If you are a male age 38-65 or a female age 47-67, in Charting, test patient and document. Then, refresh your progress note to see your input here.   Cognitive Assessment     What day of the week is this?: Correct    What month is it?: Correct    What year is it?: Correct    Recall \"Ball\": Correct    R found for this or any previous visit.          SPECIFIC DISEASE MONITORING Internal Lab or Procedure External Lab or Procedure   Annual Monitoring of Persistent     Medications (ACE/ARB, digoxin diuretics, anticonvulsants.)    Potassium  Annually Potassium

## 2017-08-14 NOTE — PATIENT INSTRUCTIONS
Facts About Dietary Fat     Olive oil is a good source of unsaturated fat. Eating less saturated and trans fat is one of the best things you can do for your heart. Start by finding out which fats are better to use.  Then always try to use as little \" © 6845-3504 The [address] [place] [address], [address] [place]. All rights reserved. This information is not intended as a substitute for professional medical care. Always follow your healthcare professional's instructions.           Tc Walker Abdominal aortic aneurysm screening (once between ages 73-68)  No results found for this or any previous visit.  Limited to patients who meet one of the following criteria:   • Men who are 73-68 years old and have smoked more than 100 cigarettes in their l End-stage renal disease   Hemophiliacs who received Factor VIII or IX concentrates   Clients of institutions for the mentally retarded   Persons who live in the same house as a HepB virus carrier   Homosexual men   Illicit injectable drug abusers     Anne Marie

## 2017-08-15 ENCOUNTER — CARDPULM VISIT (OUTPATIENT)
Dept: CARDIAC REHAB | Facility: HOSPITAL | Age: 72
End: 2017-08-15
Attending: INTERNAL MEDICINE
Payer: MEDICARE

## 2017-08-15 ENCOUNTER — TELEPHONE (OUTPATIENT)
Dept: INTERNAL MEDICINE CLINIC | Facility: CLINIC | Age: 72
End: 2017-08-15

## 2017-08-15 DIAGNOSIS — D64.9 ANEMIA, UNSPECIFIED TYPE: Primary | ICD-10-CM

## 2017-08-15 LAB
DEPRECATED HBV CORE AB SER IA-ACNC: 6 NG/ML (ref 22–322)
IRON SATURATION: 6 % (ref 13–45)
IRON: 24 UG/DL (ref 45–182)
TOTAL IRON BINDING CAPACITY: 399 UG/DL (ref 298–536)
TRANSFERRIN: 268 MG/DL (ref 200–360)

## 2017-08-15 PROCEDURE — 93798 PHYS/QHP OP CAR RHAB W/ECG: CPT

## 2017-08-15 NOTE — TELEPHONE ENCOUNTER
----- Message from Aaron Watkins MD sent at 8/15/2017  9:27 AM CDT -----  HCV screen is neg 2017  Mild MCHC anemia. Pt has been due for c-scope but unable to do due to recent stent and now on plavix for at least 6 m.  Important pt to due cologard for eval.

## 2017-08-15 NOTE — TELEPHONE ENCOUNTER
Labs added on. Results and recommendations d/w pt. Discussed importance of completing Cologuard. Pt will complete. He expressed understanding.

## 2017-08-18 ENCOUNTER — CARDPULM VISIT (OUTPATIENT)
Dept: CARDIAC REHAB | Facility: HOSPITAL | Age: 72
End: 2017-08-18
Attending: INTERNAL MEDICINE
Payer: MEDICARE

## 2017-08-18 PROCEDURE — 93798 PHYS/QHP OP CAR RHAB W/ECG: CPT

## 2017-08-21 ENCOUNTER — CARDPULM VISIT (OUTPATIENT)
Dept: CARDIAC REHAB | Facility: HOSPITAL | Age: 72
End: 2017-08-21
Attending: INTERNAL MEDICINE
Payer: MEDICARE

## 2017-08-21 PROCEDURE — 93798 PHYS/QHP OP CAR RHAB W/ECG: CPT

## 2017-08-23 ENCOUNTER — APPOINTMENT (OUTPATIENT)
Dept: CARDIAC REHAB | Facility: HOSPITAL | Age: 72
End: 2017-08-23
Attending: INTERNAL MEDICINE
Payer: MEDICARE

## 2017-08-23 PROCEDURE — 93798 PHYS/QHP OP CAR RHAB W/ECG: CPT

## 2017-08-25 ENCOUNTER — APPOINTMENT (OUTPATIENT)
Dept: CARDIAC REHAB | Facility: HOSPITAL | Age: 72
End: 2017-08-25
Attending: INTERNAL MEDICINE
Payer: MEDICARE

## 2017-08-25 PROCEDURE — 93798 PHYS/QHP OP CAR RHAB W/ECG: CPT

## 2017-08-28 ENCOUNTER — APPOINTMENT (OUTPATIENT)
Dept: CARDIAC REHAB | Facility: HOSPITAL | Age: 72
End: 2017-08-28
Attending: INTERNAL MEDICINE
Payer: MEDICARE

## 2017-08-28 PROCEDURE — 93798 PHYS/QHP OP CAR RHAB W/ECG: CPT

## 2017-08-30 ENCOUNTER — APPOINTMENT (OUTPATIENT)
Dept: CARDIAC REHAB | Facility: HOSPITAL | Age: 72
End: 2017-08-30
Attending: INTERNAL MEDICINE
Payer: MEDICARE

## 2017-08-30 PROCEDURE — 93798 PHYS/QHP OP CAR RHAB W/ECG: CPT

## 2017-08-31 ENCOUNTER — CARDPULM VISIT (OUTPATIENT)
Dept: CARDIAC REHAB | Facility: HOSPITAL | Age: 72
End: 2017-08-31
Attending: INTERNAL MEDICINE
Payer: MEDICARE

## 2017-08-31 PROCEDURE — 93798 PHYS/QHP OP CAR RHAB W/ECG: CPT

## 2017-09-01 ENCOUNTER — APPOINTMENT (OUTPATIENT)
Dept: CARDIAC REHAB | Facility: HOSPITAL | Age: 72
End: 2017-09-01
Attending: INTERNAL MEDICINE
Payer: MEDICARE

## 2017-09-04 ENCOUNTER — APPOINTMENT (OUTPATIENT)
Dept: CARDIAC REHAB | Facility: HOSPITAL | Age: 72
End: 2017-09-04
Attending: INTERNAL MEDICINE
Payer: MEDICARE

## 2017-09-05 ENCOUNTER — APPOINTMENT (OUTPATIENT)
Dept: CARDIAC REHAB | Facility: HOSPITAL | Age: 72
End: 2017-09-05
Attending: INTERNAL MEDICINE
Payer: MEDICARE

## 2017-09-06 ENCOUNTER — APPOINTMENT (OUTPATIENT)
Dept: CARDIAC REHAB | Facility: HOSPITAL | Age: 72
End: 2017-09-06
Attending: INTERNAL MEDICINE
Payer: MEDICARE

## 2017-09-08 ENCOUNTER — CARDPULM VISIT (OUTPATIENT)
Dept: CARDIAC REHAB | Facility: HOSPITAL | Age: 72
End: 2017-09-08
Attending: INTERNAL MEDICINE
Payer: MEDICARE

## 2017-09-08 PROCEDURE — 93798 PHYS/QHP OP CAR RHAB W/ECG: CPT

## 2017-09-11 ENCOUNTER — CARDPULM VISIT (OUTPATIENT)
Dept: CARDIAC REHAB | Facility: HOSPITAL | Age: 72
End: 2017-09-11
Attending: INTERNAL MEDICINE
Payer: MEDICARE

## 2017-09-11 PROCEDURE — 93798 PHYS/QHP OP CAR RHAB W/ECG: CPT

## 2017-09-12 ENCOUNTER — CARDPULM VISIT (OUTPATIENT)
Dept: CARDIAC REHAB | Facility: HOSPITAL | Age: 72
End: 2017-09-12
Attending: INTERNAL MEDICINE
Payer: MEDICARE

## 2017-09-12 PROCEDURE — 93798 PHYS/QHP OP CAR RHAB W/ECG: CPT

## 2017-09-13 ENCOUNTER — CARDPULM VISIT (OUTPATIENT)
Dept: CARDIAC REHAB | Facility: HOSPITAL | Age: 72
End: 2017-09-13
Attending: INTERNAL MEDICINE
Payer: MEDICARE

## 2017-09-13 PROCEDURE — 93798 PHYS/QHP OP CAR RHAB W/ECG: CPT

## 2017-09-14 ENCOUNTER — APPOINTMENT (OUTPATIENT)
Dept: CARDIAC REHAB | Facility: HOSPITAL | Age: 72
End: 2017-09-14
Attending: INTERNAL MEDICINE
Payer: MEDICARE

## 2017-09-15 ENCOUNTER — MED REC SCAN ONLY (OUTPATIENT)
Dept: INTERNAL MEDICINE CLINIC | Facility: CLINIC | Age: 72
End: 2017-09-15

## 2017-09-15 ENCOUNTER — APPOINTMENT (OUTPATIENT)
Dept: CARDIAC REHAB | Facility: HOSPITAL | Age: 72
End: 2017-09-15
Attending: INTERNAL MEDICINE
Payer: MEDICARE

## 2017-09-15 ENCOUNTER — TELEPHONE (OUTPATIENT)
Dept: INTERNAL MEDICINE CLINIC | Facility: CLINIC | Age: 72
End: 2017-09-15

## 2017-09-18 ENCOUNTER — APPOINTMENT (OUTPATIENT)
Dept: CARDIAC REHAB | Facility: HOSPITAL | Age: 72
End: 2017-09-18
Attending: INTERNAL MEDICINE
Payer: MEDICARE

## 2017-09-20 ENCOUNTER — APPOINTMENT (OUTPATIENT)
Dept: CARDIAC REHAB | Facility: HOSPITAL | Age: 72
End: 2017-09-20
Attending: INTERNAL MEDICINE
Payer: MEDICARE

## 2017-09-22 ENCOUNTER — CARDPULM VISIT (OUTPATIENT)
Dept: CARDIAC REHAB | Facility: HOSPITAL | Age: 72
End: 2017-09-22
Attending: INTERNAL MEDICINE
Payer: MEDICARE

## 2017-09-22 PROCEDURE — 93798 PHYS/QHP OP CAR RHAB W/ECG: CPT

## 2017-09-25 ENCOUNTER — CARDPULM VISIT (OUTPATIENT)
Dept: CARDIAC REHAB | Facility: HOSPITAL | Age: 72
End: 2017-09-25
Attending: INTERNAL MEDICINE
Payer: MEDICARE

## 2017-09-25 PROCEDURE — 93798 PHYS/QHP OP CAR RHAB W/ECG: CPT

## 2017-09-26 ENCOUNTER — CARDPULM VISIT (OUTPATIENT)
Dept: CARDIAC REHAB | Facility: HOSPITAL | Age: 72
End: 2017-09-26
Attending: INTERNAL MEDICINE
Payer: MEDICARE

## 2017-09-26 PROCEDURE — 93798 PHYS/QHP OP CAR RHAB W/ECG: CPT

## 2017-09-27 ENCOUNTER — CARDPULM VISIT (OUTPATIENT)
Dept: CARDIAC REHAB | Facility: HOSPITAL | Age: 72
End: 2017-09-27
Attending: INTERNAL MEDICINE
Payer: MEDICARE

## 2017-09-27 PROCEDURE — 93798 PHYS/QHP OP CAR RHAB W/ECG: CPT

## 2017-09-29 ENCOUNTER — NURSE ONLY (OUTPATIENT)
Dept: INTERNAL MEDICINE CLINIC | Facility: CLINIC | Age: 72
End: 2017-09-29

## 2017-09-29 ENCOUNTER — CARDPULM VISIT (OUTPATIENT)
Dept: CARDIAC REHAB | Facility: HOSPITAL | Age: 72
End: 2017-09-29
Attending: INTERNAL MEDICINE
Payer: MEDICARE

## 2017-09-29 DIAGNOSIS — Z23 NEED FOR PROPHYLACTIC VACCINATION AND INOCULATION AGAINST INFLUENZA: Primary | ICD-10-CM

## 2017-09-29 PROCEDURE — 93798 PHYS/QHP OP CAR RHAB W/ECG: CPT

## 2017-09-29 PROCEDURE — 90653 IIV ADJUVANT VACCINE IM: CPT | Performed by: INTERNAL MEDICINE

## 2017-09-29 PROCEDURE — G0008 ADMIN INFLUENZA VIRUS VAC: HCPCS | Performed by: INTERNAL MEDICINE

## 2017-10-02 ENCOUNTER — APPOINTMENT (OUTPATIENT)
Dept: CARDIAC REHAB | Facility: HOSPITAL | Age: 72
End: 2017-10-02
Attending: INTERNAL MEDICINE
Payer: MEDICARE

## 2017-10-03 ENCOUNTER — CARDPULM VISIT (OUTPATIENT)
Dept: CARDIAC REHAB | Facility: HOSPITAL | Age: 72
End: 2017-10-03
Attending: INTERNAL MEDICINE
Payer: MEDICARE

## 2017-10-03 PROCEDURE — 93798 PHYS/QHP OP CAR RHAB W/ECG: CPT

## 2017-10-04 ENCOUNTER — CARDPULM VISIT (OUTPATIENT)
Dept: CARDIAC REHAB | Facility: HOSPITAL | Age: 72
End: 2017-10-04
Attending: INTERNAL MEDICINE
Payer: MEDICARE

## 2017-10-04 PROCEDURE — 93798 PHYS/QHP OP CAR RHAB W/ECG: CPT

## 2017-10-06 ENCOUNTER — CARDPULM VISIT (OUTPATIENT)
Dept: CARDIAC REHAB | Facility: HOSPITAL | Age: 72
End: 2017-10-06
Attending: INTERNAL MEDICINE
Payer: MEDICARE

## 2017-10-06 PROCEDURE — 93798 PHYS/QHP OP CAR RHAB W/ECG: CPT

## 2017-10-09 ENCOUNTER — CARDPULM VISIT (OUTPATIENT)
Dept: CARDIAC REHAB | Facility: HOSPITAL | Age: 72
End: 2017-10-09
Attending: INTERNAL MEDICINE
Payer: MEDICARE

## 2017-10-09 PROCEDURE — 93798 PHYS/QHP OP CAR RHAB W/ECG: CPT

## 2017-10-11 ENCOUNTER — CARDPULM VISIT (OUTPATIENT)
Dept: CARDIAC REHAB | Facility: HOSPITAL | Age: 72
End: 2017-10-11
Attending: INTERNAL MEDICINE
Payer: MEDICARE

## 2017-10-11 PROCEDURE — 93798 PHYS/QHP OP CAR RHAB W/ECG: CPT

## 2017-10-12 ENCOUNTER — CARDPULM VISIT (OUTPATIENT)
Dept: CARDIAC REHAB | Facility: HOSPITAL | Age: 72
End: 2017-10-12
Attending: INTERNAL MEDICINE
Payer: MEDICARE

## 2017-10-12 PROCEDURE — 93798 PHYS/QHP OP CAR RHAB W/ECG: CPT

## 2017-10-13 ENCOUNTER — APPOINTMENT (OUTPATIENT)
Dept: CARDIAC REHAB | Facility: HOSPITAL | Age: 72
End: 2017-10-13
Attending: INTERNAL MEDICINE
Payer: MEDICARE

## 2017-10-16 ENCOUNTER — CARDPULM VISIT (OUTPATIENT)
Dept: CARDIAC REHAB | Facility: HOSPITAL | Age: 72
End: 2017-10-16
Attending: INTERNAL MEDICINE
Payer: MEDICARE

## 2017-10-16 PROCEDURE — 93798 PHYS/QHP OP CAR RHAB W/ECG: CPT

## 2017-10-18 ENCOUNTER — CARDPULM VISIT (OUTPATIENT)
Dept: CARDIAC REHAB | Facility: HOSPITAL | Age: 72
End: 2017-10-18
Attending: INTERNAL MEDICINE
Payer: MEDICARE

## 2017-10-18 PROCEDURE — 93798 PHYS/QHP OP CAR RHAB W/ECG: CPT

## 2017-10-20 ENCOUNTER — CARDPULM VISIT (OUTPATIENT)
Dept: CARDIAC REHAB | Facility: HOSPITAL | Age: 72
End: 2017-10-20
Attending: INTERNAL MEDICINE
Payer: MEDICARE

## 2017-10-20 PROCEDURE — 93798 PHYS/QHP OP CAR RHAB W/ECG: CPT

## 2017-10-23 ENCOUNTER — CARDPULM VISIT (OUTPATIENT)
Dept: CARDIAC REHAB | Facility: HOSPITAL | Age: 72
End: 2017-10-23
Attending: INTERNAL MEDICINE
Payer: MEDICARE

## 2017-10-23 PROCEDURE — 93798 PHYS/QHP OP CAR RHAB W/ECG: CPT

## 2017-10-24 ENCOUNTER — CARDPULM VISIT (OUTPATIENT)
Dept: CARDIAC REHAB | Facility: HOSPITAL | Age: 72
End: 2017-10-24
Attending: INTERNAL MEDICINE
Payer: MEDICARE

## 2017-10-24 PROCEDURE — 93798 PHYS/QHP OP CAR RHAB W/ECG: CPT

## 2017-10-25 ENCOUNTER — APPOINTMENT (OUTPATIENT)
Dept: CARDIAC REHAB | Facility: HOSPITAL | Age: 72
End: 2017-10-25
Attending: INTERNAL MEDICINE
Payer: MEDICARE

## 2017-10-27 ENCOUNTER — CARDPULM VISIT (OUTPATIENT)
Dept: CARDIAC REHAB | Facility: HOSPITAL | Age: 72
End: 2017-10-27
Attending: INTERNAL MEDICINE
Payer: MEDICARE

## 2017-10-27 PROCEDURE — 93798 PHYS/QHP OP CAR RHAB W/ECG: CPT

## 2017-10-30 ENCOUNTER — CARDPULM VISIT (OUTPATIENT)
Dept: CARDIAC REHAB | Facility: HOSPITAL | Age: 72
End: 2017-10-30
Attending: INTERNAL MEDICINE
Payer: MEDICARE

## 2017-10-30 PROCEDURE — 93798 PHYS/QHP OP CAR RHAB W/ECG: CPT

## 2017-11-01 ENCOUNTER — CARDPULM VISIT (OUTPATIENT)
Dept: CARDIAC REHAB | Facility: HOSPITAL | Age: 72
End: 2017-11-01
Attending: INTERNAL MEDICINE
Payer: MEDICARE

## 2017-11-01 PROCEDURE — 93798 PHYS/QHP OP CAR RHAB W/ECG: CPT

## 2017-11-02 ENCOUNTER — CARDPULM VISIT (OUTPATIENT)
Dept: CARDIAC REHAB | Facility: HOSPITAL | Age: 72
End: 2017-11-02
Attending: INTERNAL MEDICINE
Payer: MEDICARE

## 2017-11-02 PROCEDURE — 93798 PHYS/QHP OP CAR RHAB W/ECG: CPT

## 2017-11-03 ENCOUNTER — APPOINTMENT (OUTPATIENT)
Dept: CARDIAC REHAB | Facility: HOSPITAL | Age: 72
End: 2017-11-03
Attending: INTERNAL MEDICINE
Payer: MEDICARE

## 2017-11-06 ENCOUNTER — APPOINTMENT (OUTPATIENT)
Dept: CARDIAC REHAB | Facility: HOSPITAL | Age: 72
End: 2017-11-06
Attending: INTERNAL MEDICINE
Payer: MEDICARE

## 2017-11-07 ENCOUNTER — CARDPULM VISIT (OUTPATIENT)
Dept: CARDIAC REHAB | Facility: HOSPITAL | Age: 72
End: 2017-11-07
Attending: INTERNAL MEDICINE
Payer: MEDICARE

## 2017-11-07 PROCEDURE — 93798 PHYS/QHP OP CAR RHAB W/ECG: CPT

## 2017-11-08 ENCOUNTER — CARDPULM VISIT (OUTPATIENT)
Dept: CARDIAC REHAB | Facility: HOSPITAL | Age: 72
End: 2017-11-08
Attending: INTERNAL MEDICINE
Payer: MEDICARE

## 2017-11-08 PROCEDURE — 93798 PHYS/QHP OP CAR RHAB W/ECG: CPT

## 2017-11-10 ENCOUNTER — CARDPULM VISIT (OUTPATIENT)
Dept: CARDIAC REHAB | Facility: HOSPITAL | Age: 72
End: 2017-11-10
Attending: INTERNAL MEDICINE
Payer: MEDICARE

## 2017-11-10 PROCEDURE — 93798 PHYS/QHP OP CAR RHAB W/ECG: CPT

## 2017-11-16 ENCOUNTER — APPOINTMENT (OUTPATIENT)
Dept: CARDIAC REHAB | Facility: HOSPITAL | Age: 72
End: 2017-11-16
Attending: INTERNAL MEDICINE
Payer: MEDICARE

## 2017-11-17 ENCOUNTER — APPOINTMENT (OUTPATIENT)
Dept: CARDIAC REHAB | Facility: HOSPITAL | Age: 72
End: 2017-11-17
Attending: INTERNAL MEDICINE
Payer: MEDICARE

## 2017-11-20 ENCOUNTER — APPOINTMENT (OUTPATIENT)
Dept: CARDIAC REHAB | Facility: HOSPITAL | Age: 72
End: 2017-11-20
Attending: INTERNAL MEDICINE
Payer: MEDICARE

## 2017-12-26 ENCOUNTER — MED REC SCAN ONLY (OUTPATIENT)
Dept: INTERNAL MEDICINE CLINIC | Facility: CLINIC | Age: 72
End: 2017-12-26

## 2018-01-04 ENCOUNTER — OFFICE VISIT (OUTPATIENT)
Dept: FAMILY MEDICINE CLINIC | Facility: CLINIC | Age: 73
End: 2018-01-04

## 2018-01-04 VITALS
OXYGEN SATURATION: 99 % | HEIGHT: 71 IN | BODY MASS INDEX: 29.82 KG/M2 | TEMPERATURE: 98 F | DIASTOLIC BLOOD PRESSURE: 70 MMHG | RESPIRATION RATE: 20 BRPM | HEART RATE: 81 BPM | SYSTOLIC BLOOD PRESSURE: 112 MMHG | WEIGHT: 213 LBS

## 2018-01-04 DIAGNOSIS — J40 BRONCHITIS: Primary | ICD-10-CM

## 2018-01-04 PROCEDURE — 99213 OFFICE O/P EST LOW 20 MIN: CPT | Performed by: NURSE PRACTITIONER

## 2018-01-04 RX ORDER — CODEINE PHOSPHATE AND GUAIFENESIN 10; 100 MG/5ML; MG/5ML
10 SOLUTION ORAL 3 TIMES DAILY PRN
Qty: 180 ML | Refills: 0 | Status: SHIPPED | OUTPATIENT
Start: 2018-01-04 | End: 2018-02-22 | Stop reason: ALTCHOICE

## 2018-01-04 RX ORDER — AZITHROMYCIN 250 MG/1
TABLET, FILM COATED ORAL
Qty: 6 TABLET | Refills: 0 | Status: SHIPPED | OUTPATIENT
Start: 2018-01-04 | End: 2018-02-22 | Stop reason: ALTCHOICE

## 2018-01-04 RX ORDER — BENZONATATE 200 MG/1
200 CAPSULE ORAL 3 TIMES DAILY PRN
Qty: 30 CAPSULE | Refills: 0 | Status: SHIPPED | OUTPATIENT
Start: 2018-01-04 | End: 2018-02-22 | Stop reason: ALTCHOICE

## 2018-01-04 NOTE — PROGRESS NOTES
CHIEF COMPLAINT:   Patient presents with:  Cough: deep dry for 3 days        HPI:   Mendez Xavier is a 71year old male who presents for cough for  4  days. Cough started gradually and is described as tight and deep. Patient has history of bronchitis. GENERAL: No fever or chills. SKIN: No rashes, or other skin lesions. EYES: Denies blurred vision or double vision. HENT: Denies ear pain, decreased hearing, or sore throat. Reports mild sinus congestion.   CARDIOVASCULAR: Denies chest pain or palpitati Sig: Take 1 capsule (200 mg total) by mouth 3 (three) times daily as needed for cough. guaiFENesin-codeine (CHERATUSSIN AC) 100-10 MG/5ML Oral Solution 180 mL 0      Sig: Take 10 mL by mouth 3 (three) times daily as needed for cough.            Side · Take medicine as directed. You may be told to take ibuprofen or other over-the-counter medicines. These help relieve inflammation in your bronchial tubes. Your healthcare provider may prescribe an inhaler to help open up the bronchial tubes.  Most of the

## 2018-01-10 ENCOUNTER — MED REC SCAN ONLY (OUTPATIENT)
Dept: INTERNAL MEDICINE CLINIC | Facility: CLINIC | Age: 73
End: 2018-01-10

## 2018-02-22 ENCOUNTER — OFFICE VISIT (OUTPATIENT)
Dept: INTERNAL MEDICINE CLINIC | Facility: CLINIC | Age: 73
End: 2018-02-22

## 2018-02-22 VITALS
OXYGEN SATURATION: 94 % | TEMPERATURE: 98 F | HEART RATE: 91 BPM | HEIGHT: 70 IN | WEIGHT: 224 LBS | BODY MASS INDEX: 32.07 KG/M2 | DIASTOLIC BLOOD PRESSURE: 76 MMHG | RESPIRATION RATE: 18 BRPM | SYSTOLIC BLOOD PRESSURE: 110 MMHG

## 2018-02-22 DIAGNOSIS — J98.01 POST-INFECTION BRONCHOSPASM: Primary | ICD-10-CM

## 2018-02-22 PROCEDURE — 99213 OFFICE O/P EST LOW 20 MIN: CPT | Performed by: INTERNAL MEDICINE

## 2018-02-22 RX ORDER — CODEINE PHOSPHATE AND GUAIFENESIN 10; 100 MG/5ML; MG/5ML
10 SOLUTION ORAL NIGHTLY PRN
Qty: 118 ML | Refills: 0 | Status: SHIPPED | OUTPATIENT
Start: 2018-02-22 | End: 2018-06-05 | Stop reason: ALTCHOICE

## 2018-02-22 RX ORDER — BUDESONIDE AND FORMOTEROL FUMARATE DIHYDRATE 160; 4.5 UG/1; UG/1
1 AEROSOL RESPIRATORY (INHALATION) 2 TIMES DAILY
Qty: 1 INHALER | Refills: 0 | COMMUNITY
Start: 2018-02-22 | End: 2018-06-05 | Stop reason: ALTCHOICE

## 2018-02-22 NOTE — PROGRESS NOTES
HPI:    Patient ID: Tip Kohler is a 71year old male. Cough   This is a chronic problem. Episode onset: 4 weeks. The problem has been unchanged. Episode frequency: intermittently. The cough is non-productive.  Pertinent negatives include no chest p (See Comments)    Comment:Rapid heart rate   PHYSICAL EXAM:   Physical Exam   Constitutional: He appears well-developed and well-nourished. No distress. Cardiovascular: Normal rate, regular rhythm and normal heart sounds. No murmur heard.   Pulmonary/C

## 2018-02-22 NOTE — PATIENT INSTRUCTIONS
Discharge Instructions: Taking Calcium Channel Blockers  Your healthcare provider prescribed a medicine called a calcium channel blocker for you.  This type of medicine can treat high blood pressure, correct abnormal heart rhythms, and relieve a type of c · Breathing problems, coughing, or wheezing  · Irregular, fast, or pounding heartbeat  · Skin rash  · Swollen ankles, feet, or lower legs  · Constipation   Date Last Reviewed: 6/1/2016  © 3729-5438 The [address].  [address] · If you were given an inhaler, use it exactly as directed. If you need to use it more often than prescribed, your condition may be getting worse. Contact your healthcare provider. Follow-up care  Follow up with your healthcare provider, or as advised.   I

## 2018-06-05 ENCOUNTER — OFFICE VISIT (OUTPATIENT)
Dept: INTERNAL MEDICINE CLINIC | Facility: CLINIC | Age: 73
End: 2018-06-05

## 2018-06-05 VITALS
SYSTOLIC BLOOD PRESSURE: 110 MMHG | HEART RATE: 75 BPM | RESPIRATION RATE: 16 BRPM | DIASTOLIC BLOOD PRESSURE: 66 MMHG | HEIGHT: 70 IN | WEIGHT: 211 LBS | TEMPERATURE: 98 F | BODY MASS INDEX: 30.21 KG/M2

## 2018-06-05 DIAGNOSIS — I10 ESSENTIAL HYPERTENSION: Primary | ICD-10-CM

## 2018-06-05 DIAGNOSIS — E78.2 MIXED HYPERLIPIDEMIA: ICD-10-CM

## 2018-06-05 PROCEDURE — 99213 OFFICE O/P EST LOW 20 MIN: CPT | Performed by: INTERNAL MEDICINE

## 2018-06-05 NOTE — PROGRESS NOTES
HPI:    Patient ID: Winifred Gordillo is a 71year old male. HPI  Winifred Gordillo is a 71year old male. HPI:   Patient presents for recheck of his hypertension and hyperlipidemia.  Pt has been taking medications as instructed, no medication side eff Past Surgical History:  2002: CABG      Comment: LAD, OM, RCA, PLVB-RCA  2000: OTHER      Comment: hernia left   Social History:    Smoking status: Never Smoker                                                              Smokeless tobacco: Never Used Take 1 tablet (81 mg total) by mouth daily.  Disp: 30 tablet Rfl: 3     Allergies:  Aspirin                 TINITUS  Nitroglycerin           OTHER (SEE COMMENTS)    Comment:Rapid heart rate   PHYSICAL EXAM:   Physical Exam           ASSESSMENT/PLAN:   [employer]

## 2018-07-10 DIAGNOSIS — E78.2 MIXED HYPERLIPIDEMIA: ICD-10-CM

## 2018-07-10 DIAGNOSIS — E03.9 HYPOTHYROIDISM, UNSPECIFIED TYPE: ICD-10-CM

## 2018-07-10 DIAGNOSIS — I10 ESSENTIAL HYPERTENSION: ICD-10-CM

## 2018-07-10 RX ORDER — LEVOTHYROXINE SODIUM 137 UG/1
137 TABLET ORAL
Qty: 90 TABLET | Refills: 3 | Status: SHIPPED
Start: 2018-07-10 | End: 2019-07-23

## 2018-07-10 RX ORDER — CLOPIDOGREL BISULFATE 75 MG/1
75 TABLET ORAL DAILY
Qty: 90 TABLET | Refills: 3 | Status: SHIPPED
Start: 2018-07-10 | End: 2019-10-11

## 2018-07-10 RX ORDER — ATORVASTATIN CALCIUM 40 MG/1
40 TABLET, FILM COATED ORAL DAILY
Qty: 90 TABLET | Refills: 3 | Status: SHIPPED
Start: 2018-07-10 | End: 2019-08-31

## 2018-07-10 RX ORDER — METOPROLOL SUCCINATE 50 MG/1
50 TABLET, EXTENDED RELEASE ORAL 2 TIMES DAILY
Qty: 180 TABLET | Refills: 3 | Status: SHIPPED
Start: 2018-07-10 | End: 2019-07-22

## 2018-07-10 NOTE — TELEPHONE ENCOUNTER
From: Ramirez Nunes  Sent: 7/10/2018 11:05 AM CDT  Subject: Medication Renewal Request    Binh Vazquez would like a refill of the following medications:     atorvastatin 40 MG Oral Tab Cortney Mike MD]     Clopidogrel Bisulfate 75 MG Oral Tab [Do
normal...

## 2018-09-24 ENCOUNTER — OFFICE VISIT (OUTPATIENT)
Dept: INTERNAL MEDICINE CLINIC | Facility: CLINIC | Age: 73
End: 2018-09-24
Payer: MEDICARE

## 2018-09-24 VITALS
DIASTOLIC BLOOD PRESSURE: 78 MMHG | WEIGHT: 219 LBS | BODY MASS INDEX: 31.35 KG/M2 | SYSTOLIC BLOOD PRESSURE: 112 MMHG | RESPIRATION RATE: 16 BRPM | HEART RATE: 71 BPM | TEMPERATURE: 98 F | HEIGHT: 70 IN

## 2018-09-24 DIAGNOSIS — Z95.1 S/P CABG X 4: ICD-10-CM

## 2018-09-24 DIAGNOSIS — Z12.5 PROSTATE CANCER SCREENING: ICD-10-CM

## 2018-09-24 DIAGNOSIS — E78.2 MIXED HYPERLIPIDEMIA: ICD-10-CM

## 2018-09-24 DIAGNOSIS — E03.8 HYPOTHYROIDISM DUE TO HASHIMOTO'S THYROIDITIS: ICD-10-CM

## 2018-09-24 DIAGNOSIS — I10 ESSENTIAL HYPERTENSION: ICD-10-CM

## 2018-09-24 DIAGNOSIS — E06.3 HYPOTHYROIDISM DUE TO HASHIMOTO'S THYROIDITIS: ICD-10-CM

## 2018-09-24 DIAGNOSIS — Z00.00 ENCOUNTER FOR ANNUAL HEALTH EXAMINATION: ICD-10-CM

## 2018-09-24 DIAGNOSIS — Z23 NEED FOR INFLUENZA VACCINATION: ICD-10-CM

## 2018-09-24 DIAGNOSIS — I25.10 CORONARY ARTERY DISEASE INVOLVING NATIVE CORONARY ARTERY OF NATIVE HEART WITHOUT ANGINA PECTORIS: ICD-10-CM

## 2018-09-24 DIAGNOSIS — Z00.00 PHYSICAL EXAM, ANNUAL: Primary | ICD-10-CM

## 2018-09-24 PROCEDURE — G0439 PPPS, SUBSEQ VISIT: HCPCS | Performed by: INTERNAL MEDICINE

## 2018-09-24 PROCEDURE — 90653 IIV ADJUVANT VACCINE IM: CPT | Performed by: INTERNAL MEDICINE

## 2018-09-24 PROCEDURE — G0008 ADMIN INFLUENZA VIRUS VAC: HCPCS | Performed by: INTERNAL MEDICINE

## 2018-09-24 NOTE — PROGRESS NOTES
HPI:   Mendez Xavier is a 71year old male who presents for a Medicare Subsequent Annual Wellness visit (Pt already had Initial Annual Wellness).     HPI:  Here for physical  No complaints  Reports normal state of health    PAST MEDICAL, SOCIAL, FAMILY H Mixed hyperlipidemia     Coronary artery disease involving native coronary artery of native heart without angina pectoris     S/P CABG x 4     Essential hypertension     Hypothyroidism due to Hashimoto's thyroiditis    Wt Readings from Last 3 Encounters: well otherwise  SKIN: denies any unusual skin lesions  EYES: denies blurred vision or double vision  HEENT: denies nasal congestion, sinus pain or ST  LUNGS: denies shortness of breath with exertion  CARDIOVASCULAR: denies chest pain on exertion  GI: denie unlabored   Chest Wall:  No tenderness or deformity   Heart:  Regular rate and rhythm, S1, S2 normal, no murmur, rub or gallop   Abdomen:   Soft, non-tender, bowel sounds active all four quadrants,  no masses, no organomegaly   Genitalia: deferred   Rectal Cont current med therapy  Check cmp and ua     Hypothyroidism due to Hashimoto's thyroiditis- stable. Cont replacement therapy. Check tsh.     cologard 2017 was neg. Repeat 2020.  Check psa    The patient indicates understanding of these issues and agrees t Prostate Cancer Screening      PSA  Annually PSA due on 01/03/2019  Update Health Maintenance if applicable     Immunizations (Update Immunization Activity if applicable)     Influenza  Covered Annually 9/29/2017   Please get every year    Pneumococcal 1 PHYSICAL EXAM:   Physical Exam           ASSESSMENT/PLAN:   Mixed hyperlipidemia  Coronary artery disease involving native coronary artery of native heart without angina pectoris  S/p cabg x 4  Hypothyroidism due to hashimoto's thyroiditis  Essential hyp

## 2018-09-24 NOTE — PATIENT INSTRUCTIONS
Eating Heart-Healthy Foods  Eating has a big impact on your heart health. In fact, eating healthier can improve several of your heart risks at once. For instance, it helps you manage weight, cholesterol, and blood pressure.  Here are ideas to help you cristobal Aim to make these foods staples of your diet. If you have diabetes, you may have different recommendations than what is listed here:  · Fruits and vegetables provide plenty of nutrients without a lot of calories.  At meals, fill half your plate with these f · Choose ingredients that spice up your food without adding calories, fat, or sodium. Try these items: horseradish, hot sauce, lemon, mustard, nonfat salad dressings, and vinegar.  For salt-free herbs and spices, try basil, cilantro, cinnamon, pepper, and r Triglycerides (mg/dL)   Date Value   08/14/2017 170 (H)   01/03/2017 123        EKG - covered if needed at Welcome to Medicare, and non-screening if indicated for medical reasons    Electrocardiogram date07/28/2017 Routine EKG is not a screening covered se Pneumococcal 23 (Pneumovax)  Covered Once after 72 Orders placed or performed in visit on 08/14/17   • PNEUMOCOCCAL IMM (PNEUMOVAX)    Please get once after your 65th birthday    Hepatitis B for Moderate/High Risk No orders found for this or any previous

## 2018-09-25 ENCOUNTER — APPOINTMENT (OUTPATIENT)
Dept: LAB | Age: 73
End: 2018-09-25
Attending: INTERNAL MEDICINE
Payer: MEDICARE

## 2018-09-25 DIAGNOSIS — I10 ESSENTIAL HYPERTENSION: ICD-10-CM

## 2018-09-25 DIAGNOSIS — Z12.5 PROSTATE CANCER SCREENING: ICD-10-CM

## 2018-09-25 DIAGNOSIS — E78.2 MIXED HYPERLIPIDEMIA: ICD-10-CM

## 2018-09-25 DIAGNOSIS — E06.3 HYPOTHYROIDISM DUE TO HASHIMOTO'S THYROIDITIS: ICD-10-CM

## 2018-09-25 DIAGNOSIS — E03.8 HYPOTHYROIDISM DUE TO HASHIMOTO'S THYROIDITIS: ICD-10-CM

## 2018-09-25 LAB
ALBUMIN SERPL-MCNC: 3.7 G/DL (ref 3.5–4.8)
ALBUMIN/GLOB SERPL: 1.1 {RATIO} (ref 1–2)
ALP LIVER SERPL-CCNC: 92 U/L (ref 45–117)
ALT SERPL-CCNC: 28 U/L (ref 17–63)
ANION GAP SERPL CALC-SCNC: 5 MMOL/L (ref 0–18)
AST SERPL-CCNC: 18 U/L (ref 15–41)
BILIRUB SERPL-MCNC: 0.6 MG/DL (ref 0.1–2)
BILIRUB UR QL STRIP.AUTO: NEGATIVE
BUN BLD-MCNC: 19 MG/DL (ref 8–20)
BUN/CREAT SERPL: 16.2 (ref 10–20)
CALCIUM BLD-MCNC: 8.4 MG/DL (ref 8.3–10.3)
CHLORIDE SERPL-SCNC: 111 MMOL/L (ref 101–111)
CHOLEST SMN-MCNC: 115 MG/DL (ref ?–200)
CLARITY UR REFRACT.AUTO: CLEAR
CO2 SERPL-SCNC: 25 MMOL/L (ref 22–32)
COLOR UR AUTO: YELLOW
COMPLEXED PSA SERPL-MCNC: 0.4 NG/ML (ref 0.01–4)
CREAT BLD-MCNC: 1.17 MG/DL (ref 0.7–1.3)
GLOBULIN PLAS-MCNC: 3.3 G/DL (ref 2.5–4)
GLUCOSE BLD-MCNC: 125 MG/DL (ref 70–99)
GLUCOSE UR STRIP.AUTO-MCNC: NEGATIVE MG/DL
HDLC SERPL-MCNC: 35 MG/DL (ref 40–59)
KETONES UR STRIP.AUTO-MCNC: NEGATIVE MG/DL
LDLC SERPL CALC-MCNC: 60 MG/DL (ref ?–100)
LEUKOCYTE ESTERASE UR QL STRIP.AUTO: NEGATIVE
M PROTEIN MFR SERPL ELPH: 7 G/DL (ref 6.1–8.3)
NITRITE UR QL STRIP.AUTO: NEGATIVE
NONHDLC SERPL-MCNC: 80 MG/DL (ref ?–130)
OSMOLALITY SERPL CALC.SUM OF ELEC: 296 MOSM/KG (ref 275–295)
PH UR STRIP.AUTO: 5 [PH] (ref 4.5–8)
POTASSIUM SERPL-SCNC: 4.2 MMOL/L (ref 3.6–5.1)
PROT UR STRIP.AUTO-MCNC: NEGATIVE MG/DL
RBC UR QL AUTO: NEGATIVE
SODIUM SERPL-SCNC: 141 MMOL/L (ref 136–144)
SP GR UR STRIP.AUTO: 1.02 (ref 1–1.03)
TRIGL SERPL-MCNC: 100 MG/DL (ref 30–149)
TSI SER-ACNC: 1.19 MIU/ML (ref 0.35–5.5)
UROBILINOGEN UR STRIP.AUTO-MCNC: 2 MG/DL
VLDLC SERPL CALC-MCNC: 20 MG/DL (ref 0–30)

## 2018-09-25 PROCEDURE — 81003 URINALYSIS AUTO W/O SCOPE: CPT

## 2018-09-25 PROCEDURE — 80053 COMPREHEN METABOLIC PANEL: CPT

## 2018-09-25 PROCEDURE — 84443 ASSAY THYROID STIM HORMONE: CPT

## 2018-09-25 PROCEDURE — 80061 LIPID PANEL: CPT

## 2018-09-25 PROCEDURE — 36415 COLL VENOUS BLD VENIPUNCTURE: CPT

## 2018-11-20 ENCOUNTER — TELEPHONE (OUTPATIENT)
Dept: INTERNAL MEDICINE CLINIC | Facility: CLINIC | Age: 73
End: 2018-11-20

## 2018-12-19 ENCOUNTER — OFFICE VISIT (OUTPATIENT)
Dept: FAMILY MEDICINE CLINIC | Facility: CLINIC | Age: 73
End: 2018-12-19
Payer: MEDICARE

## 2018-12-19 VITALS
OXYGEN SATURATION: 96 % | DIASTOLIC BLOOD PRESSURE: 72 MMHG | SYSTOLIC BLOOD PRESSURE: 116 MMHG | WEIGHT: 220.63 LBS | HEART RATE: 85 BPM | RESPIRATION RATE: 20 BRPM | BODY MASS INDEX: 32 KG/M2 | TEMPERATURE: 99 F

## 2018-12-19 DIAGNOSIS — J40 BRONCHITIS: Primary | ICD-10-CM

## 2018-12-19 PROCEDURE — 99213 OFFICE O/P EST LOW 20 MIN: CPT | Performed by: NURSE PRACTITIONER

## 2018-12-19 RX ORDER — DEXTROMETHORPHAN HYDROBROMIDE AND PROMETHAZINE HYDROCHLORIDE 15; 6.25 MG/5ML; MG/5ML
5 SYRUP ORAL 4 TIMES DAILY PRN
Qty: 120 ML | Refills: 0 | Status: SHIPPED | OUTPATIENT
Start: 2018-12-19 | End: 2018-12-29

## 2018-12-19 RX ORDER — BENZONATATE 200 MG/1
200 CAPSULE ORAL 3 TIMES DAILY PRN
Qty: 20 CAPSULE | Refills: 0 | Status: SHIPPED | OUTPATIENT
Start: 2018-12-19 | End: 2018-12-26

## 2018-12-19 RX ORDER — AZITHROMYCIN 250 MG/1
TABLET, FILM COATED ORAL
Qty: 6 TABLET | Refills: 0 | Status: SHIPPED | OUTPATIENT
Start: 2018-12-19 | End: 2019-04-29 | Stop reason: ALTCHOICE

## 2018-12-19 NOTE — PROGRESS NOTES
CHIEF COMPLAINT:   Patient presents with:  Cough: dry cough, worst at night  x 10 days    HPI:   Ollie Desai is a 68year old male who presents for cough for  10  days. Cough started gradually and is described as tight and deep.  Patient has history o • S/P CABG x 4 2002      Social History:  Social History    Tobacco Use      Smoking status: Never Smoker      Smokeless tobacco: Never Used    Alcohol use: No      Alcohol/week: 0.0 oz    Drug use: No       REVIEW OF SYSTEMS:   GENERAL: See HPI  SKIN: No The patient is asked to follow-up if no improvement in 2-3 days or sooner if sx worsen. Printed script for zpak if symptoms worsen or do not continue to improve.      Requested Prescriptions     Signed Prescriptions Disp Refills   • benzonatate 200 MG Ora · You may use over-the-counter medicine to control fever or pain, unless another pain medicine was prescribed. If you have chronic liver or kidney disease or have ever had a stomach ulcer or gastrointestinal bleeding, talk with your healthcare provider bef © 0336-8066 The [address]. [address] [facility] [place], [address] [place]. All rights reserved. This information is not intended as a substitute for professional medical care. Always follow your healthcare professional's instructions.             The

## 2019-04-29 ENCOUNTER — OFFICE VISIT (OUTPATIENT)
Dept: INTERNAL MEDICINE CLINIC | Facility: CLINIC | Age: 74
End: 2019-04-29
Payer: MEDICARE

## 2019-04-29 VITALS
BODY MASS INDEX: 31.64 KG/M2 | HEIGHT: 70 IN | DIASTOLIC BLOOD PRESSURE: 82 MMHG | SYSTOLIC BLOOD PRESSURE: 126 MMHG | RESPIRATION RATE: 16 BRPM | WEIGHT: 221 LBS | HEART RATE: 69 BPM | TEMPERATURE: 98 F

## 2019-04-29 DIAGNOSIS — I10 ESSENTIAL HYPERTENSION: Primary | ICD-10-CM

## 2019-04-29 DIAGNOSIS — E78.2 MIXED HYPERLIPIDEMIA: ICD-10-CM

## 2019-04-29 PROCEDURE — 99213 OFFICE O/P EST LOW 20 MIN: CPT | Performed by: INTERNAL MEDICINE

## 2019-04-29 NOTE — PROGRESS NOTES
HPI:    Patient ID: Ramirez Nunes is a 68year old male. HPI  Ramirez Nunes is a 68year old male. HPI:   Patient presents for recheck of his hypertension and hyperlipidemia.  Pt has been taking medications as instructed, no medication side eff (hypertension)    • Hyperlipidemia    • Hypothyroid    • Kidney stones    • S/P CABG x 4 2002      Past Surgical History:   Procedure Laterality Date   • CABG  2002    LAD, OM, RCA, PLVB-RCA   • OTHER  2000    hernia left      Social History:    Social His Tablet 24 Hr Take 1 tablet (50 mg total) by mouth 2 (two) times daily. Disp: 180 tablet Rfl: 3   aspirin 81 MG Oral Tab EC Take 1 tablet (81 mg total) by mouth daily.  Disp: 30 tablet Rfl: 3     Allergies:  Aspirin                 TINITUS  Nitroglycerin

## 2019-04-30 ENCOUNTER — APPOINTMENT (OUTPATIENT)
Dept: LAB | Age: 74
End: 2019-04-30
Attending: INTERNAL MEDICINE
Payer: MEDICARE

## 2019-04-30 DIAGNOSIS — I10 ESSENTIAL HYPERTENSION: ICD-10-CM

## 2019-04-30 PROCEDURE — 80053 COMPREHEN METABOLIC PANEL: CPT

## 2019-04-30 PROCEDURE — 36415 COLL VENOUS BLD VENIPUNCTURE: CPT

## 2019-05-01 ENCOUNTER — TELEPHONE (OUTPATIENT)
Dept: INTERNAL MEDICINE CLINIC | Facility: CLINIC | Age: 74
End: 2019-05-01

## 2019-05-01 DIAGNOSIS — R73.01 ELEVATED FASTING GLUCOSE: Primary | ICD-10-CM

## 2019-05-01 NOTE — TELEPHONE ENCOUNTER
----- Message from Miriam Maradiaga MD sent at 4/30/2019  5:02 PM CDT -----  Check if pt was fasting. If not then elevated glucose is ok.  If pt was fasting then need recheck fbs and check a1c  Otherwise renal/lft are normal

## 2019-05-01 NOTE — TELEPHONE ENCOUNTER
Spoke with pt he was fasting. Pt aware of results and the need to repeat FBS. He expressed understanding. Orders placed.

## 2019-05-03 ENCOUNTER — APPOINTMENT (OUTPATIENT)
Dept: LAB | Age: 74
End: 2019-05-03
Attending: INTERNAL MEDICINE
Payer: MEDICARE

## 2019-05-03 DIAGNOSIS — R73.01 ELEVATED FASTING GLUCOSE: ICD-10-CM

## 2019-05-03 PROCEDURE — 36415 COLL VENOUS BLD VENIPUNCTURE: CPT

## 2019-05-03 PROCEDURE — 82947 ASSAY GLUCOSE BLOOD QUANT: CPT

## 2019-05-03 PROCEDURE — 83036 HEMOGLOBIN GLYCOSYLATED A1C: CPT

## 2019-05-06 ENCOUNTER — TELEPHONE (OUTPATIENT)
Dept: INTERNAL MEDICINE CLINIC | Facility: CLINIC | Age: 74
End: 2019-05-06

## 2019-05-06 DIAGNOSIS — E11.65 UNCONTROLLED TYPE 2 DIABETES MELLITUS WITH HYPERGLYCEMIA (HCC): Primary | ICD-10-CM

## 2019-05-06 NOTE — TELEPHONE ENCOUNTER
Per Dr. Selvin Pickens repeat A1C in 3 months and f/u in 3 months. Results and recommendations d/w pt he expressed understanding. He just had an eye exam with Dr. Brandi Kennedy and this was dilated. Copy requested from office.   Pt given # for diabetic educatio

## 2019-05-06 NOTE — TELEPHONE ENCOUNTER
----- Message from Ximena Gustafson MD sent at 5/6/2019  8:52 AM CDT -----  New onset DM2. Start metformin 500 mg bid. Refer to dm2 education. . Check microalbumin.   Refer for optho exam with dr Ifrah Daily

## 2019-05-07 ENCOUNTER — MED REC SCAN ONLY (OUTPATIENT)
Dept: INTERNAL MEDICINE CLINIC | Facility: CLINIC | Age: 74
End: 2019-05-07

## 2019-06-06 ENCOUNTER — DIABETIC EDUCATION (OUTPATIENT)
Dept: ENDOCRINOLOGY CLINIC | Facility: CLINIC | Age: 74
End: 2019-06-06
Payer: MEDICARE

## 2019-06-06 VITALS — HEIGHT: 70 IN | BODY MASS INDEX: 30.32 KG/M2 | WEIGHT: 211.81 LBS

## 2019-06-06 DIAGNOSIS — E11.65 TYPE 2 DIABETES MELLITUS WITH HYPERGLYCEMIA, WITHOUT LONG-TERM CURRENT USE OF INSULIN (HCC): Primary | ICD-10-CM

## 2019-06-06 PROCEDURE — G0108 DIAB MANAGE TRN  PER INDIV: HCPCS | Performed by: DIETITIAN, REGISTERED

## 2019-06-06 RX ORDER — BLOOD SUGAR DIAGNOSTIC
1 STRIP MISCELLANEOUS DAILY
Qty: 100 STRIP | Refills: 1 | Status: SHIPPED | OUTPATIENT
Start: 2019-06-06 | End: 2019-11-30

## 2019-06-06 RX ORDER — LANCETS 33 GAUGE
1 EACH MISCELLANEOUS DAILY
Qty: 1 BOX | Refills: 0 | Status: SHIPPED | OUTPATIENT
Start: 2019-06-06 | End: 2019-08-31

## 2019-06-06 NOTE — PROGRESS NOTES
Ramirez Nunes  : 1945 attended Step 1 Diabetic Education:    Date: 2019  Referring Provider: Dr Augusto Izaguirre time: 3:00 End time: 4:00    Ht 70\"   Wt 211 lb 12.8 oz   BMI 30.39 kg/m²     HgbA1C (%)   Date Value   2019 7.8 (H)      Nicky Argueta

## 2019-06-07 ENCOUNTER — TELEPHONE (OUTPATIENT)
Dept: ENDOCRINOLOGY CLINIC | Facility: CLINIC | Age: 74
End: 2019-06-07

## 2019-06-07 NOTE — TELEPHONE ENCOUNTER
Pt came in saying he was told at the pharmacy that the test strips he was given yesterday are not covered. Pt was not told which meter is covered. Pt walked in today saying he needs a different meter.   Called pharmacy & spoke to pharmacist. She said the O

## 2019-06-10 NOTE — TELEPHONE ENCOUNTER
Spoke with pt, asked him if everything was okay with his test strips and lancets. He hasn't opened them yet as still had samples but will call if any problems.

## 2019-07-22 DIAGNOSIS — I10 ESSENTIAL HYPERTENSION: ICD-10-CM

## 2019-07-22 RX ORDER — METOPROLOL SUCCINATE 50 MG/1
50 TABLET, EXTENDED RELEASE ORAL 2 TIMES DAILY
Qty: 180 TABLET | Refills: 0 | Status: SHIPPED | OUTPATIENT
Start: 2019-07-22 | End: 2019-11-05

## 2019-07-23 DIAGNOSIS — E03.9 HYPOTHYROIDISM, UNSPECIFIED TYPE: ICD-10-CM

## 2019-07-23 RX ORDER — LEVOTHYROXINE SODIUM 137 UG/1
137 TABLET ORAL
Qty: 90 TABLET | Refills: 1 | Status: SHIPPED | OUTPATIENT
Start: 2019-07-23 | End: 2020-01-06

## 2019-07-23 NOTE — TELEPHONE ENCOUNTER
[place], [address] [facility]  F: [phone]    Refill Req:   Levothyroxine 0.137MG Tab  Qty: 270    Fax in triage

## 2019-08-31 DIAGNOSIS — E11.65 TYPE 2 DIABETES MELLITUS WITH HYPERGLYCEMIA, WITHOUT LONG-TERM CURRENT USE OF INSULIN (HCC): ICD-10-CM

## 2019-08-31 DIAGNOSIS — E78.2 MIXED HYPERLIPIDEMIA: ICD-10-CM

## 2019-09-01 RX ORDER — ATORVASTATIN CALCIUM 40 MG/1
40 TABLET, FILM COATED ORAL DAILY
Qty: 90 TABLET | Refills: 3 | Status: SHIPPED | OUTPATIENT
Start: 2019-09-01 | End: 2020-09-04

## 2019-09-03 RX ORDER — LANCETS 33 GAUGE
EACH MISCELLANEOUS
Qty: 100 EACH | Refills: 3 | Status: SHIPPED | OUTPATIENT
Start: 2019-09-03

## 2019-09-05 ENCOUNTER — LAB ENCOUNTER (OUTPATIENT)
Dept: LAB | Age: 74
End: 2019-09-05
Attending: INTERNAL MEDICINE
Payer: MEDICARE

## 2019-09-05 DIAGNOSIS — E11.65 UNCONTROLLED TYPE 2 DIABETES MELLITUS WITH HYPERGLYCEMIA (HCC): ICD-10-CM

## 2019-09-05 DIAGNOSIS — R73.09 ELEVATED GLUCOSE: Primary | ICD-10-CM

## 2019-09-05 LAB
CREAT UR-SCNC: 267 MG/DL
EST. AVERAGE GLUCOSE BLD GHB EST-MCNC: 154 MG/DL (ref 68–126)
HBA1C MFR BLD HPLC: 7 % (ref ?–5.7)
MICROALBUMIN UR-MCNC: 3.03 MG/DL
MICROALBUMIN/CREAT 24H UR-RTO: 11.3 UG/MG (ref ?–30)

## 2019-09-05 PROCEDURE — 82570 ASSAY OF URINE CREATININE: CPT

## 2019-09-05 PROCEDURE — 83036 HEMOGLOBIN GLYCOSYLATED A1C: CPT

## 2019-09-05 PROCEDURE — 82043 UR ALBUMIN QUANTITATIVE: CPT

## 2019-09-05 PROCEDURE — 36415 COLL VENOUS BLD VENIPUNCTURE: CPT

## 2019-09-26 ENCOUNTER — OFFICE VISIT (OUTPATIENT)
Dept: INTERNAL MEDICINE CLINIC | Facility: CLINIC | Age: 74
End: 2019-09-26
Payer: MEDICARE

## 2019-09-26 VITALS
TEMPERATURE: 98 F | DIASTOLIC BLOOD PRESSURE: 68 MMHG | HEIGHT: 70 IN | BODY MASS INDEX: 30.21 KG/M2 | RESPIRATION RATE: 16 BRPM | SYSTOLIC BLOOD PRESSURE: 122 MMHG | HEART RATE: 72 BPM | WEIGHT: 211 LBS

## 2019-09-26 DIAGNOSIS — E78.2 MIXED HYPERLIPIDEMIA: ICD-10-CM

## 2019-09-26 DIAGNOSIS — E06.3 HYPOTHYROIDISM DUE TO HASHIMOTO'S THYROIDITIS: ICD-10-CM

## 2019-09-26 DIAGNOSIS — E11.9 TYPE 2 DIABETES MELLITUS WITHOUT COMPLICATION, WITHOUT LONG-TERM CURRENT USE OF INSULIN (HCC): ICD-10-CM

## 2019-09-26 DIAGNOSIS — Z00.00 ENCOUNTER FOR ANNUAL HEALTH EXAMINATION: ICD-10-CM

## 2019-09-26 DIAGNOSIS — I25.10 CORONARY ARTERY DISEASE INVOLVING NATIVE CORONARY ARTERY OF NATIVE HEART WITHOUT ANGINA PECTORIS: ICD-10-CM

## 2019-09-26 DIAGNOSIS — Z00.00 ANNUAL PHYSICAL EXAM: Primary | ICD-10-CM

## 2019-09-26 DIAGNOSIS — I10 ESSENTIAL HYPERTENSION: ICD-10-CM

## 2019-09-26 DIAGNOSIS — E03.8 HYPOTHYROIDISM DUE TO HASHIMOTO'S THYROIDITIS: ICD-10-CM

## 2019-09-26 DIAGNOSIS — Z95.1 S/P CABG X 4: ICD-10-CM

## 2019-09-26 PROCEDURE — G0439 PPPS, SUBSEQ VISIT: HCPCS | Performed by: INTERNAL MEDICINE

## 2019-09-26 NOTE — PROGRESS NOTES
HPI:   Ramirez Nunes is a 68year old male who presents for a Medicare Subsequent Annual Wellness visit (Pt already had Initial Annual Wellness).     HPI:  Here for AWV  No complaints  Denies cp or sob  No hypoglycemia    PAST MEDICAL, SOCIAL, FAMILY HIS Mixed hyperlipidemia     Coronary artery disease involving native coronary artery of native heart without angina pectoris     S/P CABG x 4     Essential hypertension     Hypothyroidism due to Hashimoto's thyroiditis     Type 2 diabetes mellitus without c (coronary artery disease), Calculus of kidney (9/1/2000), Diabetes ([facility] [address] Essential hypertension (6/1/2002), HTN (hypertension), Hyperlipidemia, Hypothyroid, Hypothyroidism (6/1/1992), Kidney stones, and S/P CABG x 4 (2002).     He  has a past surgical hist Normocephalic, without obvious abnormality, atraumatic   Eyes:  PERRL, conjunctiva/corneas clear, EOM's intact, both eyes   Ears:  Normal TM's and external ear canals, both ears   Nose: Nares normal, septum midline, mucosa normal, no drainage or sinus tend hyperlipidemia    Coronary artery disease involving native coronary artery of native heart without angina pectoris    S/P CABG x 4    Essential hypertension    Hypothyroidism due to Hashimoto's thyroiditis    Encounter for annual health examination    Type Update Health Maintenance if applicable    Flex Sigmoidoscopy Screen every 10 years No results found for this or any previous visit. No flowsheet data found. Fecal Occult Blood Annually No results found for: FOB No flowsheet data found.     Glaucoma Scr mouth daily. Disp: 90 tablet Rfl: 3   Levothyroxine Sodium 137 MCG Oral Tab Take 137 mcg by mouth before breakfast. Disp: 90 tablet Rfl: 1   Metoprolol Succinate ER 50 MG Oral Tablet 24 Hr Take 1 tablet (50 mg total) by mouth 2 (two) times daily.  Disp: 180 Tab 180 tablet 1     Sig: Take 1 tablet (850 mg total) by mouth 2 (two) times daily with meals.        Imaging & Referrals:  None       #[id number]

## 2019-09-26 NOTE — PATIENT INSTRUCTIONS
Low-Salt Choices  Eating salt (sodium) can make your body retain too much water. Excess water makes your heart work harder. Canned, packaged, and frozen foods are easy to prepare. But they are often high in sodium.  Here are some ideas for low-salt foods Tests on this list are recommended by your physician but may not be covered, or covered at this frequency, by your insurer. Please check with your insurance carrier before scheduling to verify coverage.     PREVENTATIVE SERVICES  INDICATIONS AND SCHEDULE In • Anyone with a family history    Colorectal Cancer Screening Covered up to Age 76     Colonoscopy Screen   Covered every 10 years- more often if abnormal Colonoscopy due on 12/06/1996 Update [facility] if applicable    Flex Sigmoidoscopy Screen  C Tetanus Toxoid- Only covered with a cut with metal- TD and TDaP Not covered by Medicare Part B) No orders found for this or any previous visit.  This may be covered with your prescription benefits, but Medicare does not cover unless Medically needed    Zos

## 2019-10-02 ENCOUNTER — TELEPHONE (OUTPATIENT)
Dept: ENDOCRINOLOGY CLINIC | Facility: CLINIC | Age: 74
End: 2019-10-02

## 2019-10-11 DIAGNOSIS — I25.10 CORONARY ARTERY DISEASE INVOLVING NATIVE CORONARY ARTERY OF NATIVE HEART WITHOUT ANGINA PECTORIS: Primary | ICD-10-CM

## 2019-10-11 DIAGNOSIS — Z95.1 S/P CABG X 4: ICD-10-CM

## 2019-10-11 DIAGNOSIS — E78.2 MIXED HYPERLIPIDEMIA: ICD-10-CM

## 2019-10-11 RX ORDER — CLOPIDOGREL BISULFATE 75 MG/1
TABLET ORAL
Qty: 90 TABLET | Refills: 2 | Status: SHIPPED | OUTPATIENT
Start: 2019-10-11 | End: 2020-03-13

## 2019-11-05 DIAGNOSIS — I10 ESSENTIAL HYPERTENSION: ICD-10-CM

## 2019-11-05 RX ORDER — METOPROLOL SUCCINATE 50 MG/1
50 TABLET, EXTENDED RELEASE ORAL 2 TIMES DAILY
Qty: 180 TABLET | Refills: 1 | Status: SHIPPED | OUTPATIENT
Start: 2019-11-05 | End: 2020-03-13

## 2019-11-05 NOTE — TELEPHONE ENCOUNTER
Marshall Walls on file  Refill Req:    Metoprolol Succinate ER 50 MG Oral Tablet 24 Hr    Fax in triage

## 2019-11-29 DIAGNOSIS — E11.65 TYPE 2 DIABETES MELLITUS WITH HYPERGLYCEMIA, WITHOUT LONG-TERM CURRENT USE OF INSULIN (HCC): ICD-10-CM

## 2019-11-30 RX ORDER — BLOOD SUGAR DIAGNOSTIC
STRIP MISCELLANEOUS
Qty: 100 STRIP | Refills: 0 | Status: SHIPPED | OUTPATIENT
Start: 2019-11-30

## 2020-01-06 DIAGNOSIS — E03.9 HYPOTHYROIDISM, UNSPECIFIED TYPE: ICD-10-CM

## 2020-01-06 RX ORDER — LEVOTHYROXINE SODIUM 137 UG/1
TABLET ORAL
Qty: 90 TABLET | Refills: 0 | Status: SHIPPED | OUTPATIENT
Start: 2020-01-06 | End: 2020-03-13

## 2020-03-13 DIAGNOSIS — E11.9 TYPE 2 DIABETES MELLITUS WITHOUT COMPLICATION, WITHOUT LONG-TERM CURRENT USE OF INSULIN (HCC): ICD-10-CM

## 2020-03-13 DIAGNOSIS — I10 ESSENTIAL HYPERTENSION: ICD-10-CM

## 2020-03-13 DIAGNOSIS — Z95.1 S/P CABG X 4: ICD-10-CM

## 2020-03-13 DIAGNOSIS — E03.9 HYPOTHYROIDISM, UNSPECIFIED TYPE: ICD-10-CM

## 2020-03-13 DIAGNOSIS — I25.10 CORONARY ARTERY DISEASE INVOLVING NATIVE CORONARY ARTERY OF NATIVE HEART WITHOUT ANGINA PECTORIS: ICD-10-CM

## 2020-03-13 RX ORDER — LEVOTHYROXINE SODIUM 137 UG/1
TABLET ORAL
Qty: 90 TABLET | Refills: 0 | Status: SHIPPED | OUTPATIENT
Start: 2020-03-13 | End: 2020-06-09

## 2020-03-13 RX ORDER — METOPROLOL SUCCINATE 50 MG/1
TABLET, EXTENDED RELEASE ORAL
Qty: 180 TABLET | Refills: 1 | Status: SHIPPED | OUTPATIENT
Start: 2020-03-13 | End: 2020-09-16

## 2020-03-13 RX ORDER — CLOPIDOGREL BISULFATE 75 MG/1
TABLET ORAL
Qty: 270 TABLET | Refills: 0 | Status: SHIPPED | OUTPATIENT
Start: 2020-03-13 | End: 2020-09-16

## 2020-03-16 ENCOUNTER — OFFICE VISIT (OUTPATIENT)
Dept: INTERNAL MEDICINE CLINIC | Facility: CLINIC | Age: 75
End: 2020-03-16
Payer: MEDICARE

## 2020-03-16 ENCOUNTER — APPOINTMENT (OUTPATIENT)
Dept: LAB | Age: 75
End: 2020-03-16
Attending: INTERNAL MEDICINE
Payer: MEDICARE

## 2020-03-16 VITALS
HEIGHT: 70 IN | BODY MASS INDEX: 29.78 KG/M2 | SYSTOLIC BLOOD PRESSURE: 124 MMHG | DIASTOLIC BLOOD PRESSURE: 68 MMHG | OXYGEN SATURATION: 97 % | RESPIRATION RATE: 16 BRPM | WEIGHT: 208 LBS | HEART RATE: 84 BPM | TEMPERATURE: 98 F

## 2020-03-16 DIAGNOSIS — E11.9 TYPE 2 DIABETES MELLITUS WITHOUT COMPLICATION, WITHOUT LONG-TERM CURRENT USE OF INSULIN (HCC): ICD-10-CM

## 2020-03-16 DIAGNOSIS — I10 ESSENTIAL HYPERTENSION: ICD-10-CM

## 2020-03-16 DIAGNOSIS — E78.2 MIXED HYPERLIPIDEMIA: ICD-10-CM

## 2020-03-16 DIAGNOSIS — E11.9 TYPE 2 DIABETES MELLITUS WITHOUT COMPLICATION, WITHOUT LONG-TERM CURRENT USE OF INSULIN (HCC): Primary | ICD-10-CM

## 2020-03-16 LAB
ALBUMIN SERPL-MCNC: 3.9 G/DL (ref 3.4–5)
ALBUMIN/GLOB SERPL: 1.1 {RATIO} (ref 1–2)
ALP LIVER SERPL-CCNC: 91 U/L (ref 45–117)
ALT SERPL-CCNC: 38 U/L (ref 16–61)
ANION GAP SERPL CALC-SCNC: 5 MMOL/L (ref 0–18)
AST SERPL-CCNC: 24 U/L (ref 15–37)
BILIRUB SERPL-MCNC: 0.7 MG/DL (ref 0.1–2)
BUN BLD-MCNC: 18 MG/DL (ref 7–18)
BUN/CREAT SERPL: 15.7 (ref 10–20)
CALCIUM BLD-MCNC: 8.8 MG/DL (ref 8.5–10.1)
CHLORIDE SERPL-SCNC: 107 MMOL/L (ref 98–112)
CHOLEST SMN-MCNC: 127 MG/DL (ref ?–200)
CO2 SERPL-SCNC: 27 MMOL/L (ref 21–32)
CREAT BLD-MCNC: 1.15 MG/DL (ref 0.7–1.3)
CREAT UR-SCNC: 216 MG/DL
EST. AVERAGE GLUCOSE BLD GHB EST-MCNC: 154 MG/DL (ref 68–126)
GLOBULIN PLAS-MCNC: 3.4 G/DL (ref 2.8–4.4)
GLUCOSE BLD-MCNC: 90 MG/DL (ref 70–99)
HBA1C MFR BLD HPLC: 7 % (ref ?–5.7)
HDLC SERPL-MCNC: 31 MG/DL (ref 40–59)
LDLC SERPL CALC-MCNC: 70 MG/DL (ref ?–100)
M PROTEIN MFR SERPL ELPH: 7.3 G/DL (ref 6.4–8.2)
MICROALBUMIN UR-MCNC: 1.38 MG/DL
MICROALBUMIN/CREAT 24H UR-RTO: 6.4 UG/MG (ref ?–30)
NONHDLC SERPL-MCNC: 96 MG/DL (ref ?–130)
OSMOLALITY SERPL CALC.SUM OF ELEC: 289 MOSM/KG (ref 275–295)
PATIENT FASTING Y/N/NP: YES
PATIENT FASTING Y/N/NP: YES
POTASSIUM SERPL-SCNC: 4.4 MMOL/L (ref 3.5–5.1)
SODIUM SERPL-SCNC: 139 MMOL/L (ref 136–145)
TRIGL SERPL-MCNC: 131 MG/DL (ref 30–149)
VLDLC SERPL CALC-MCNC: 26 MG/DL (ref 0–30)

## 2020-03-16 PROCEDURE — 82570 ASSAY OF URINE CREATININE: CPT

## 2020-03-16 PROCEDURE — 80053 COMPREHEN METABOLIC PANEL: CPT

## 2020-03-16 PROCEDURE — 83036 HEMOGLOBIN GLYCOSYLATED A1C: CPT

## 2020-03-16 PROCEDURE — 80061 LIPID PANEL: CPT

## 2020-03-16 PROCEDURE — 36415 COLL VENOUS BLD VENIPUNCTURE: CPT

## 2020-03-16 PROCEDURE — 99214 OFFICE O/P EST MOD 30 MIN: CPT | Performed by: INTERNAL MEDICINE

## 2020-03-16 PROCEDURE — 82043 UR ALBUMIN QUANTITATIVE: CPT

## 2020-03-16 NOTE — PROGRESS NOTES
HPI:    Patient ID: Jack Hernández is a 69year old male. Hypertension       HPI:   Jack Hernández is a 69year old male who presents for recheck of his diabetes, htn and hyperlipidemia. Patient’s FBS have been at goal. No hypotension.   Pt has been Hr TAKE 1 TABLET(50 MG) BY MOUTH TWICE DAILY 180 tablet 1   • LEVOTHYROXINE SODIUM 137 MCG Oral Tab TAKE 1 TABLET BY MOUTH BEFORE BREAKFAST 90 tablet 0   • ONETOUCH VERIO In Vitro Strip USE AS DIRECTED DAILY 100 strip 0   • ONETOUCH DELICA LANCETS 72L Does murmur  GI: good BS's,no masses, HSM or tenderness  EXTREMITIES: no cyanosis, clubbing or edema  NEURO: Bilateral barefoot skin diabetic exam is normal, visualized feet and the appearance is normal.  Bilateral monofilament/sensation of both feet is normal. diagnosis)  Essential hypertension  Mixed hyperlipidemia    Orders Placed This Encounter      COMP METABOLIC PANEL      HGB C0B      Microalb/Creat Ratio, Random Urine      LIPID PANEL      Meds This Visit:  Requested Prescriptions      No prescriptions re

## 2020-03-16 NOTE — PATIENT INSTRUCTIONS
Diabetes: Activity Tips    Being more active can help you manage your diabetes. The tips on this sheet can help you get the most from your exercise. They can also help you stay safe.   Staying active  It’s important for adults to spend less time sitting a You may be told to plan your exercise for 1 to 2 hours after a meal. In most cases, you don’t need to eat while being active. Test your blood sugar before exercising if you take insulin or medicine that can cause low blood sugar.  And carry a fast-acting head

## 2020-06-09 DIAGNOSIS — E03.9 HYPOTHYROIDISM, UNSPECIFIED TYPE: ICD-10-CM

## 2020-06-09 RX ORDER — LEVOTHYROXINE SODIUM 137 UG/1
TABLET ORAL
Qty: 90 TABLET | Refills: 0 | Status: SHIPPED | OUTPATIENT
Start: 2020-06-09 | End: 2020-09-16

## 2020-06-22 DIAGNOSIS — E11.9 TYPE 2 DIABETES MELLITUS WITHOUT COMPLICATION, WITHOUT LONG-TERM CURRENT USE OF INSULIN (HCC): ICD-10-CM

## 2020-06-22 NOTE — TELEPHONE ENCOUNTER
Last time medication was refilled 3/2020   Quantity  and number of refills 180 with 0 refill    Last office visit 3/2020   Next office visit 9/2020

## 2020-09-04 DIAGNOSIS — E78.2 MIXED HYPERLIPIDEMIA: ICD-10-CM

## 2020-09-04 RX ORDER — ATORVASTATIN CALCIUM 40 MG/1
TABLET, FILM COATED ORAL
Qty: 90 TABLET | Refills: 0 | Status: SHIPPED | OUTPATIENT
Start: 2020-09-04 | End: 2020-12-11

## 2020-09-16 DIAGNOSIS — Z12.5 SPECIAL SCREENING FOR MALIGNANT NEOPLASM OF PROSTATE: ICD-10-CM

## 2020-09-16 DIAGNOSIS — I25.10 CORONARY ARTERY DISEASE INVOLVING NATIVE CORONARY ARTERY OF NATIVE HEART WITHOUT ANGINA PECTORIS: ICD-10-CM

## 2020-09-16 DIAGNOSIS — E78.2 MIXED HYPERLIPIDEMIA: ICD-10-CM

## 2020-09-16 DIAGNOSIS — I10 ESSENTIAL HYPERTENSION: ICD-10-CM

## 2020-09-16 DIAGNOSIS — Z95.1 S/P CABG X 4: ICD-10-CM

## 2020-09-16 DIAGNOSIS — E11.9 TYPE 2 DIABETES MELLITUS WITHOUT COMPLICATION, WITHOUT LONG-TERM CURRENT USE OF INSULIN (HCC): Primary | ICD-10-CM

## 2020-09-16 DIAGNOSIS — E03.9 HYPOTHYROIDISM, UNSPECIFIED TYPE: ICD-10-CM

## 2020-09-16 RX ORDER — CLOPIDOGREL BISULFATE 75 MG/1
75 TABLET ORAL DAILY
Qty: 90 TABLET | Refills: 1 | Status: SHIPPED | OUTPATIENT
Start: 2020-09-16 | End: 2021-01-16

## 2020-09-16 RX ORDER — LEVOTHYROXINE SODIUM 137 UG/1
137 TABLET ORAL
Qty: 90 TABLET | Refills: 0 | Status: SHIPPED | OUTPATIENT
Start: 2020-09-16 | End: 2020-09-23

## 2020-09-16 RX ORDER — METOPROLOL SUCCINATE 50 MG/1
50 TABLET, EXTENDED RELEASE ORAL 2 TIMES DAILY
Qty: 180 TABLET | Refills: 1 | Status: SHIPPED | OUTPATIENT
Start: 2020-09-16 | End: 2021-01-16

## 2020-09-17 DIAGNOSIS — E11.9 TYPE 2 DIABETES MELLITUS WITHOUT COMPLICATION, WITHOUT LONG-TERM CURRENT USE OF INSULIN (HCC): ICD-10-CM

## 2020-09-22 ENCOUNTER — OFFICE VISIT (OUTPATIENT)
Dept: INTERNAL MEDICINE CLINIC | Facility: CLINIC | Age: 75
End: 2020-09-22
Payer: MEDICARE

## 2020-09-22 ENCOUNTER — LAB ENCOUNTER (OUTPATIENT)
Dept: LAB | Age: 75
End: 2020-09-22
Attending: INTERNAL MEDICINE
Payer: MEDICARE

## 2020-09-22 VITALS
OXYGEN SATURATION: 98 % | WEIGHT: 202 LBS | HEIGHT: 70 IN | DIASTOLIC BLOOD PRESSURE: 74 MMHG | TEMPERATURE: 99 F | SYSTOLIC BLOOD PRESSURE: 120 MMHG | HEART RATE: 78 BPM | RESPIRATION RATE: 16 BRPM | BODY MASS INDEX: 28.92 KG/M2

## 2020-09-22 DIAGNOSIS — E03.8 HYPOTHYROIDISM DUE TO HASHIMOTO'S THYROIDITIS: ICD-10-CM

## 2020-09-22 DIAGNOSIS — E03.9 HYPOTHYROIDISM, UNSPECIFIED TYPE: ICD-10-CM

## 2020-09-22 DIAGNOSIS — Z00.00 ANNUAL PHYSICAL EXAM: Primary | ICD-10-CM

## 2020-09-22 DIAGNOSIS — I10 ESSENTIAL HYPERTENSION: ICD-10-CM

## 2020-09-22 DIAGNOSIS — I25.10 CORONARY ARTERY DISEASE INVOLVING NATIVE CORONARY ARTERY OF NATIVE HEART WITHOUT ANGINA PECTORIS: ICD-10-CM

## 2020-09-22 DIAGNOSIS — Z00.00 ENCOUNTER FOR ANNUAL HEALTH EXAMINATION: ICD-10-CM

## 2020-09-22 DIAGNOSIS — E11.9 TYPE 2 DIABETES MELLITUS WITHOUT COMPLICATION, WITHOUT LONG-TERM CURRENT USE OF INSULIN (HCC): ICD-10-CM

## 2020-09-22 DIAGNOSIS — Z95.1 S/P CABG X 4: ICD-10-CM

## 2020-09-22 DIAGNOSIS — E78.2 MIXED HYPERLIPIDEMIA: ICD-10-CM

## 2020-09-22 DIAGNOSIS — E11.65 TYPE 2 DIABETES MELLITUS WITH HYPERGLYCEMIA, WITHOUT LONG-TERM CURRENT USE OF INSULIN (HCC): ICD-10-CM

## 2020-09-22 DIAGNOSIS — Z12.5 SPECIAL SCREENING FOR MALIGNANT NEOPLASM OF PROSTATE: ICD-10-CM

## 2020-09-22 DIAGNOSIS — E06.3 HYPOTHYROIDISM DUE TO HASHIMOTO'S THYROIDITIS: ICD-10-CM

## 2020-09-22 LAB
ALBUMIN SERPL-MCNC: 4 G/DL (ref 3.4–5)
ALBUMIN/GLOB SERPL: 1.2 {RATIO} (ref 1–2)
ALP LIVER SERPL-CCNC: 91 U/L
ALT SERPL-CCNC: 31 U/L
ANION GAP SERPL CALC-SCNC: 5 MMOL/L (ref 0–18)
AST SERPL-CCNC: 18 U/L (ref 15–37)
BILIRUB SERPL-MCNC: 0.6 MG/DL (ref 0.1–2)
BUN BLD-MCNC: 17 MG/DL (ref 7–18)
BUN/CREAT SERPL: 17.2 (ref 10–20)
CALCIUM BLD-MCNC: 9.6 MG/DL (ref 8.5–10.1)
CHLORIDE SERPL-SCNC: 106 MMOL/L (ref 98–112)
CHOLEST SMN-MCNC: 129 MG/DL (ref ?–200)
CO2 SERPL-SCNC: 27 MMOL/L (ref 21–32)
COMPLEXED PSA SERPL-MCNC: 0.35 NG/ML (ref ?–4)
CREAT BLD-MCNC: 0.99 MG/DL
EST. AVERAGE GLUCOSE BLD GHB EST-MCNC: 160 MG/DL (ref 68–126)
GLOBULIN PLAS-MCNC: 3.3 G/DL (ref 2.8–4.4)
GLUCOSE BLD-MCNC: 126 MG/DL (ref 70–99)
HBA1C MFR BLD HPLC: 7.2 % (ref ?–5.7)
HDLC SERPL-MCNC: 39 MG/DL (ref 40–59)
LDLC SERPL CALC-MCNC: 64 MG/DL (ref ?–100)
M PROTEIN MFR SERPL ELPH: 7.3 G/DL (ref 6.4–8.2)
NONHDLC SERPL-MCNC: 90 MG/DL (ref ?–130)
OSMOLALITY SERPL CALC.SUM OF ELEC: 289 MOSM/KG (ref 275–295)
PATIENT FASTING Y/N/NP: NO
PATIENT FASTING Y/N/NP: NO
POTASSIUM SERPL-SCNC: 4.2 MMOL/L (ref 3.5–5.1)
SODIUM SERPL-SCNC: 138 MMOL/L (ref 136–145)
TRIGL SERPL-MCNC: 130 MG/DL (ref 30–149)
TSI SER-ACNC: 0.3 MIU/ML (ref 0.36–3.74)
VLDLC SERPL CALC-MCNC: 26 MG/DL (ref 0–30)

## 2020-09-22 PROCEDURE — 36415 COLL VENOUS BLD VENIPUNCTURE: CPT

## 2020-09-22 PROCEDURE — 83036 HEMOGLOBIN GLYCOSYLATED A1C: CPT

## 2020-09-22 PROCEDURE — 80053 COMPREHEN METABOLIC PANEL: CPT

## 2020-09-22 PROCEDURE — G0439 PPPS, SUBSEQ VISIT: HCPCS | Performed by: INTERNAL MEDICINE

## 2020-09-22 PROCEDURE — 84443 ASSAY THYROID STIM HORMONE: CPT

## 2020-09-22 PROCEDURE — 80061 LIPID PANEL: CPT

## 2020-09-22 NOTE — PATIENT INSTRUCTIONS
Ryan Vazquez's SCREENING SCHEDULE   Tests on this list are recommended by your physician but may not be covered, or covered at this frequency, by your insurer. Please check with your insurance carrier before scheduling to verify coverage.     Milagros Berry lifetime   • Anyone with a family history    Colorectal Cancer Screening Covered up to Age 76     Colonoscopy Screen   Covered every 10 years- more often if abnormal Colonoscopy due on 12/06/1996 Update [facility] if applicable    Flex Sigmoidoscop covered with a cut with metal- TD and TDaP Not covered by Medicare Part B) No orders found for this or any previous visit.  This may be covered with your prescription benefits, but Medicare does not cover unless Medically needed    Zoster (Not covered by Me

## 2020-09-22 NOTE — PROGRESS NOTES
HPI:   Valentino Cameron is a 69year old male who presents for a Medicare Subsequent Annual Wellness visit (Pt already had Initial Annual Wellness).     HPI:  Here for AWV  No complaints   Normal state of health  Denies cp, sob or hypoglycemia    PAST MEDIC Component Value Date    CHOLEST 127 03/16/2020    HDL 31 (L) 03/16/2020    LDL 70 03/16/2020    TRIG 131 03/16/2020          Last Chemistry Labs:   Lab Results   Component Value Date    AST 24 03/16/2020    ALT 38 03/16/2020    CA 8.8 03/16/2020    ALB 3 use drugs.      REVIEW OF SYSTEMS:   GENERAL: feels well otherwise  SKIN: denies any unusual skin lesions  EYES: denies blurred vision or double vision  HEENT: denies nasal congestion, sinus pain or ST  LUNGS: denies shortness of breath with exertion  CARDI quadrants,  no masses, no organomegaly   Genitalia: deferred   Rectal: deferred   Extremities: Extremities normal, atraumatic, no cyanosis or edema   Pulses: 2+ and symmetric   Skin: Skin color, texture, turgor normal, no rashes or lesions   Lymph nodes: C Hashimoto's thyroiditis- stable. Cont replacement therapy     Type 2 diabetes mellitus with hyperglycemia, without long-term current use of insulin (HCC)-controlled.  Cont current med therapy      Vaccines are up to date      Check cologard for colon cance Diabetics, FHx Glaucoma, AA>50, > 65 No flowsheet data found.     Prostate Cancer Screening      PSA  Annually PSA due on 09/25/2020  Update Health Maintenance if applicable     Immunizations (Update Immunization Activity if applicable)     Influenz

## 2020-09-23 ENCOUNTER — TELEPHONE (OUTPATIENT)
Dept: INTERNAL MEDICINE CLINIC | Facility: CLINIC | Age: 75
End: 2020-09-23

## 2020-09-23 DIAGNOSIS — E06.3 HYPOTHYROIDISM DUE TO HASHIMOTO'S THYROIDITIS: Primary | ICD-10-CM

## 2020-09-23 DIAGNOSIS — E03.8 HYPOTHYROIDISM DUE TO HASHIMOTO'S THYROIDITIS: Primary | ICD-10-CM

## 2020-09-23 RX ORDER — LEVOTHYROXINE SODIUM 0.12 MG/1
125 TABLET ORAL
Qty: 30 TABLET | Refills: 1 | Status: SHIPPED | OUTPATIENT
Start: 2020-09-23 | End: 2021-01-16

## 2020-09-23 NOTE — TELEPHONE ENCOUNTER
----- Message from Miriam Maradiaga MD sent at 9/23/2020  8:25 AM CDT -----  dm2 is at fair control  Renal/lft are normal  Thyroid functions suggest over medicated with levothyroxine.  Suggest decrease levothyroxine to 125 mcg daily and recheck tsh in 6 weeks

## 2020-09-23 NOTE — TELEPHONE ENCOUNTER
Discussed with pt results, medication changes and repeat lab. He expressed understanding. rx sent and lab order placed.

## 2020-09-30 ENCOUNTER — MED REC SCAN ONLY (OUTPATIENT)
Dept: INTERNAL MEDICINE CLINIC | Facility: CLINIC | Age: 75
End: 2020-09-30

## 2020-10-01 ENCOUNTER — TELEPHONE (OUTPATIENT)
Dept: INTERNAL MEDICINE CLINIC | Facility: CLINIC | Age: 75
End: 2020-10-01

## 2020-10-01 DIAGNOSIS — Z12.11 ENCOUNTER FOR SCREENING COLONOSCOPY: Primary | ICD-10-CM

## 2020-10-01 DIAGNOSIS — R19.5 POSITIVE COLORECTAL CANCER SCREENING USING COLOGUARD TEST: ICD-10-CM

## 2020-10-01 NOTE — TELEPHONE ENCOUNTER
Patient Result Update    Result Positive Cologuard     Anyone in provider support can help if a call is needed

## 2020-10-01 NOTE — TELEPHONE ENCOUNTER
Per dr ian pradhan test is positive f/u with GI dr Krishna Garrett     Patient expresses understanding of lab results and processes going forward.    Information for referral provided      Dr Joellen Hall

## 2020-10-02 ENCOUNTER — TELEPHONE (OUTPATIENT)
Dept: INTERNAL MEDICINE CLINIC | Facility: CLINIC | Age: 75
End: 2020-10-02

## 2020-10-02 NOTE — TELEPHONE ENCOUNTER
Per Dr. Jojo Naylor ok to hold plavix 5 days prior to colonoscopy. Pt aware and asked that letter be sent to Dr. Brian Guajardo. He was informed this will be done electronically through Epic. Letter sent.

## 2020-10-02 NOTE — TELEPHONE ENCOUNTER
Patient called and stated he needs a letter he takes Plavix and give to Dr Liliane Gustafson, so he can have his lower GI completed? Please advise.

## 2020-10-07 ENCOUNTER — LAB ENCOUNTER (OUTPATIENT)
Dept: LAB | Age: 75
End: 2020-10-07
Attending: NURSE PRACTITIONER
Payer: MEDICARE

## 2020-10-07 DIAGNOSIS — R19.5 POSITIVE COLORECTAL CANCER SCREENING USING COLOGUARD TEST: ICD-10-CM

## 2020-10-07 PROCEDURE — 85025 COMPLETE CBC W/AUTO DIFF WBC: CPT

## 2020-10-07 PROCEDURE — 36415 COLL VENOUS BLD VENIPUNCTURE: CPT

## 2020-10-10 ENCOUNTER — LAB ENCOUNTER (OUTPATIENT)
Dept: LAB | Age: 75
End: 2020-10-10
Attending: NURSE PRACTITIONER
Payer: MEDICARE

## 2020-10-10 DIAGNOSIS — Z00.00 ROUTINE GENERAL MEDICAL EXAMINATION AT A HEALTH CARE FACILITY: ICD-10-CM

## 2020-10-10 DIAGNOSIS — R19.5 POSITIVE COLORECTAL CANCER SCREENING USING COLOGUARD TEST: ICD-10-CM

## 2020-10-10 DIAGNOSIS — D64.9 ANEMIA, UNSPECIFIED TYPE: ICD-10-CM

## 2020-10-10 PROCEDURE — 82607 VITAMIN B-12: CPT

## 2020-10-10 PROCEDURE — 83550 IRON BINDING TEST: CPT

## 2020-10-10 PROCEDURE — 82728 ASSAY OF FERRITIN: CPT

## 2020-10-10 PROCEDURE — 81003 URINALYSIS AUTO W/O SCOPE: CPT

## 2020-10-10 PROCEDURE — 83540 ASSAY OF IRON: CPT

## 2020-10-10 PROCEDURE — 36415 COLL VENOUS BLD VENIPUNCTURE: CPT

## 2020-10-10 PROCEDURE — 82746 ASSAY OF FOLIC ACID SERUM: CPT

## 2020-10-13 PROBLEM — K63.5 POLYP OF COLON: Status: ACTIVE | Noted: 2020-10-13

## 2020-10-13 PROBLEM — R19.5 ABNORMAL FECES: Status: ACTIVE | Noted: 2020-10-13

## 2020-10-13 PROBLEM — D12.0 BENIGN NEOPLASM OF CECUM: Status: ACTIVE | Noted: 2020-10-13

## 2020-10-13 PROBLEM — D50.9 ANEMIA, IRON DEFICIENCY: Status: ACTIVE | Noted: 2020-10-13

## 2020-10-20 ENCOUNTER — HOSPITAL ENCOUNTER (OUTPATIENT)
Dept: CT IMAGING | Facility: HOSPITAL | Age: 75
Discharge: HOME | End: 2020-10-20
Attending: INTERNAL MEDICINE
Payer: MEDICARE

## 2020-10-20 DIAGNOSIS — D50.0 IRON DEFICIENCY ANEMIA DUE TO CHRONIC BLOOD LOSS: ICD-10-CM

## 2020-10-20 PROCEDURE — 74177 CT ABD & PELVIS W/CONTRAST: CPT | Performed by: INTERNAL MEDICINE

## 2020-10-20 NOTE — IMAGING NOTE
Pt verified name and  and allergies. Pt denies glucagonoma, insulinoma, pheochromocytosis, small bowel obstruction history. Pt sts he is \"pre diabetic and takes metformin.   Pt notified glucagon increases blood sugar, so he may notice slightly higher v

## 2020-11-09 ENCOUNTER — LAB ENCOUNTER (OUTPATIENT)
Dept: LAB | Age: 75
End: 2020-11-09
Attending: INTERNAL MEDICINE
Payer: MEDICARE

## 2020-11-09 DIAGNOSIS — E03.8 HYPOTHYROIDISM DUE TO HASHIMOTO'S THYROIDITIS: ICD-10-CM

## 2020-11-09 DIAGNOSIS — E06.3 HYPOTHYROIDISM DUE TO HASHIMOTO'S THYROIDITIS: ICD-10-CM

## 2020-11-09 PROCEDURE — 84443 ASSAY THYROID STIM HORMONE: CPT

## 2020-11-09 PROCEDURE — 36415 COLL VENOUS BLD VENIPUNCTURE: CPT

## 2020-11-10 ENCOUNTER — TELEPHONE (OUTPATIENT)
Dept: INTERNAL MEDICINE CLINIC | Facility: CLINIC | Age: 75
End: 2020-11-10

## 2020-11-10 NOTE — TELEPHONE ENCOUNTER
Done by Dr. Sofía Neely MD   [address] [facility]     Ph: [phone]

## 2020-12-03 ENCOUNTER — OFFICE VISIT (OUTPATIENT)
Dept: SURGERY | Facility: CLINIC | Age: 75
End: 2020-12-03
Payer: MEDICARE

## 2020-12-03 VITALS — DIASTOLIC BLOOD PRESSURE: 82 MMHG | SYSTOLIC BLOOD PRESSURE: 131 MMHG | HEART RATE: 86 BPM | TEMPERATURE: 97 F

## 2020-12-03 DIAGNOSIS — D50.9 IRON DEFICIENCY ANEMIA, UNSPECIFIED IRON DEFICIENCY ANEMIA TYPE: ICD-10-CM

## 2020-12-03 DIAGNOSIS — K64.3 GRADE IV INTERNAL HEMORRHOIDS: Primary | ICD-10-CM

## 2020-12-03 PROCEDURE — 99204 OFFICE O/P NEW MOD 45 MIN: CPT | Performed by: SURGERY

## 2020-12-03 PROCEDURE — 46600 DIAGNOSTIC ANOSCOPY SPX: CPT | Performed by: SURGERY

## 2020-12-03 NOTE — PATIENT INSTRUCTIONS
Hemorrhoids    Hemorrhoids are swollen and inflamed veins inside the rectum and near the anus. The rectum is the last several inches of the colon. The anus is the passage between the rectum and the outside of the body.    Causes  The veins can become swol ? Take a fiber supplement or bulking agent, if advised by your healthcare provider. These include products such as psyllium or methylcellulose. · Drink more water. Your healthcare provider may direct you to drink plenty of water.  This can help keep stool

## 2020-12-03 NOTE — H&P
New Patient Visit Note       Active Problems      1. Grade IV internal hemorrhoids        Chief Complaint   Patient presents with:  Hemorrhoids: C19 - New patient - internal hemmroids. Pain only when trying to have a bm 3/10. Denies rectal bleeding.  States proceed with in office rubber band ligation and phenol injection. I explained the patient that he has an increased risk of post procedure bleeding given that he is on Plavix.   But, I believe the patient can safely tolerate in office procedure without the Smoking status: Never Smoker      Smokeless tobacco: Never Used    Substance and Sexual Activity      Alcohol use: No        Alcohol/week: 0.0 standard drinks      Drug use: No    Other Topics      Concerns:        Caffeine Concern: No        Exercise: No vomiting. Genitourinary: Negative for difficulty urinating, dysuria, frequency and urgency. Musculoskeletal: Negative for arthralgias and myalgias. Skin: Negative for color change and rash. Neurological: Negative for tremors, syncope and weakness. no blood. The prostate is smooth and of appropriate size for age. Internal hemorrhoid tissue is palpable. Neurological: He is alert and oriented to person, place, and time. Skin: Skin is warm and dry. Psychiatric: He has a normal mood and affect. encounter. Imaging & Referrals   None    Follow Up  No follow-ups on file.     Ang Donahue MD

## 2020-12-10 ENCOUNTER — OFFICE VISIT (OUTPATIENT)
Dept: SURGERY | Facility: CLINIC | Age: 75
End: 2020-12-10
Payer: MEDICARE

## 2020-12-10 VITALS
TEMPERATURE: 97 F | HEART RATE: 82 BPM | SYSTOLIC BLOOD PRESSURE: 135 MMHG | WEIGHT: 198 LBS | BODY MASS INDEX: 28.35 KG/M2 | DIASTOLIC BLOOD PRESSURE: 82 MMHG | HEIGHT: 70 IN

## 2020-12-10 DIAGNOSIS — K64.2 PROLAPSED INTERNAL HEMORRHOIDS, GRADE 3: Primary | ICD-10-CM

## 2020-12-10 PROBLEM — K64.3 GRADE IV INTERNAL HEMORRHOIDS: Status: RESOLVED | Noted: 2020-12-03 | Resolved: 2020-12-10

## 2020-12-10 PROCEDURE — 46221 LIGATION OF HEMORRHOID(S): CPT | Performed by: SURGERY

## 2020-12-10 NOTE — PROGRESS NOTES
Follow Up Visit Note       Active Problems      No diagnosis found. Chief Complaint   Patient presents with:  Hemorrhoid Banding: Est PT- 1st banding        History of Present Illness        Allergies  Troy Alexandra is allergic to aspirin and nitroglycerin. Concern: No    Social History Narrative      3 stepchildren , 1 biologic child.         Current Outpatient Medications:   •  Levothyroxine Sodium 125 MCG Oral Tab, Take 1 tablet (125 mcg total) by mouth before breakfast. Repeat TSH on/after 11/4/2020, Disp: Psychiatric/Behavioral: Negative for behavioral problems and sleep disturbance. Physical Findings   There were no vitals taken for this visit. Physical Exam     Assessment   No diagnosis found.     Plan        No orders of the defined types were p

## 2020-12-10 NOTE — PROGRESS NOTES
Follow Up Visit Note       Active Problems      1. Prolapsed internal hemorrhoids, grade 3          Chief Complaint   Patient presents with:  Hemorrhoid Banding: Est PT- 1st banding - PT. states he has increased his fiber intake over the last two weeks.  Pt Tobacco Use      Smoking status: Never Smoker      Smokeless tobacco: Never Used    Substance and Sexual Activity      Alcohol use: No        Alcohol/week: 0.0 standard drinks      Drug use: No    Other Topics      Concerns:        Caffeine Concern: No patient was draped and exposed in the usual fashion. A medical assistant was present at all times. External visualization of the perineum and gluteal cleft was performed. Digital exam was performed with a well lubricated examining finger.     Kunal Joyce

## 2020-12-11 DIAGNOSIS — E11.9 TYPE 2 DIABETES MELLITUS WITHOUT COMPLICATION, WITHOUT LONG-TERM CURRENT USE OF INSULIN (HCC): ICD-10-CM

## 2020-12-11 DIAGNOSIS — E78.2 MIXED HYPERLIPIDEMIA: ICD-10-CM

## 2020-12-11 RX ORDER — ATORVASTATIN CALCIUM 40 MG/1
TABLET, FILM COATED ORAL
Qty: 90 TABLET | Refills: 0 | Status: SHIPPED | OUTPATIENT
Start: 2020-12-11 | End: 2021-01-16

## 2020-12-17 ENCOUNTER — TELEPHONE (OUTPATIENT)
Dept: SURGERY | Facility: CLINIC | Age: 75
End: 2020-12-17

## 2021-01-07 ENCOUNTER — OFFICE VISIT (OUTPATIENT)
Dept: SURGERY | Facility: CLINIC | Age: 76
End: 2021-01-07
Payer: MEDICARE

## 2021-01-07 VITALS — TEMPERATURE: 97 F

## 2021-01-07 DIAGNOSIS — K64.2 PROLAPSED INTERNAL HEMORRHOIDS, GRADE 3: Primary | ICD-10-CM

## 2021-01-07 PROCEDURE — 46221 LIGATION OF HEMORRHOID(S): CPT | Performed by: SURGERY

## 2021-01-07 NOTE — PROGRESS NOTES
Follow Up Visit Note       Active Problems      1.  Prolapsed internal hemorrhoids, grade 3          Chief Complaint   Patient presents with:  Hemorrhoid Bandinnd banding         History of Present Illness    The patient presents for rubber band ligatio Topics      Concerns:        Caffeine Concern: No        Exercise: No        Seat Belt: No        Special Diet: No        Stress Concern: No        Weight Concern: No    Social History Narrative      3 stepchildren , 1 biologic child.       Current Outpatie anal canal was well visualized. An internal hemorrhoid was identified and well visualized. The internal hemorrhoid was grasped well above the dentate line with the grasper. The internal hemorrhoid was pulled within the O-ring ligator.     The O-rin

## 2021-01-07 NOTE — PROGRESS NOTES
Follow Up Visit Note       Active Problems      No diagnosis found. Chief Complaint   Patient presents with:  Hemorrhoid Bandinnd banding         History of Present Illness        Allergies  Mendel Rand is allergic to aspirin and nitroglycerin.     Past No    Social History Narrative      3 stepchildren , 1 biologic child.         Current Outpatient Medications:   •  ATORVASTATIN 40 MG Oral Tab, TAKE 1 TABLET BY MOUTH ONE TIME A DAY , Disp: 90 tablet, Rfl: 0  •  METFORMIN  MG Oral Tab, TAKE 1 TABLET behavioral problems and sleep disturbance. Physical Findings   There were no vitals taken for this visit. Physical Exam     Assessment   No diagnosis found. Plan        No orders of the defined types were placed in this encounter.       Imaging &

## 2021-01-08 ENCOUNTER — MED REC SCAN ONLY (OUTPATIENT)
Dept: INTERNAL MEDICINE CLINIC | Facility: CLINIC | Age: 76
End: 2021-01-08

## 2021-01-16 DIAGNOSIS — I25.10 CORONARY ARTERY DISEASE INVOLVING NATIVE CORONARY ARTERY OF NATIVE HEART WITHOUT ANGINA PECTORIS: ICD-10-CM

## 2021-01-16 DIAGNOSIS — E78.2 MIXED HYPERLIPIDEMIA: ICD-10-CM

## 2021-01-16 DIAGNOSIS — E11.9 TYPE 2 DIABETES MELLITUS WITHOUT COMPLICATION, WITHOUT LONG-TERM CURRENT USE OF INSULIN (HCC): ICD-10-CM

## 2021-01-16 DIAGNOSIS — I10 ESSENTIAL HYPERTENSION: ICD-10-CM

## 2021-01-16 DIAGNOSIS — E06.3 HYPOTHYROIDISM DUE TO HASHIMOTO'S THYROIDITIS: ICD-10-CM

## 2021-01-16 DIAGNOSIS — Z95.1 S/P CABG X 4: ICD-10-CM

## 2021-01-16 DIAGNOSIS — E03.8 HYPOTHYROIDISM DUE TO HASHIMOTO'S THYROIDITIS: ICD-10-CM

## 2021-01-17 RX ORDER — CLOPIDOGREL BISULFATE 75 MG/1
75 TABLET ORAL DAILY
Qty: 90 TABLET | Refills: 1 | Status: SHIPPED | OUTPATIENT
Start: 2021-01-17 | End: 2021-03-09

## 2021-01-17 RX ORDER — LEVOTHYROXINE SODIUM 0.12 MG/1
125 TABLET ORAL
Qty: 30 TABLET | Refills: 1 | Status: SHIPPED | OUTPATIENT
Start: 2021-01-17 | End: 2021-04-20

## 2021-01-17 RX ORDER — METOPROLOL SUCCINATE 50 MG/1
50 TABLET, EXTENDED RELEASE ORAL 2 TIMES DAILY
Qty: 180 TABLET | Refills: 1 | Status: SHIPPED | OUTPATIENT
Start: 2021-01-17 | End: 2021-03-09

## 2021-01-17 RX ORDER — ATORVASTATIN CALCIUM 40 MG/1
40 TABLET, FILM COATED ORAL DAILY
Qty: 90 TABLET | Refills: 0 | Status: SHIPPED | OUTPATIENT
Start: 2021-01-17 | End: 2021-03-09

## 2021-01-20 ENCOUNTER — OFFICE VISIT (OUTPATIENT)
Dept: SURGERY | Facility: CLINIC | Age: 76
End: 2021-01-20
Payer: MEDICARE

## 2021-01-20 VITALS — TEMPERATURE: 97 F

## 2021-01-20 DIAGNOSIS — K64.2 PROLAPSED INTERNAL HEMORRHOIDS, GRADE 3: Primary | ICD-10-CM

## 2021-01-20 PROCEDURE — 46221 LIGATION OF HEMORRHOID(S): CPT | Performed by: SURGERY

## 2021-01-20 NOTE — PROGRESS NOTES
Follow Up Visit Note       Active Problems      No diagnosis found. Chief Complaint   Patient presents with:  Hemorrhoid Banding: 3rd banding.  pt denies concerns        History of Present Illness        Allergies  Moraima Richardson is allergic to aspirin and nit Weight Concern: No    Social History Narrative      3 stepchildren , 1 biologic child. Current Outpatient Medications:   •  Clopidogrel Bisulfate 75 MG Oral Tab, Take 1 tablet (75 mg total) by mouth daily. , Disp: 90 tablet, Rfl: 1  •  Metoprolol Suc Psychiatric/Behavioral: Negative for behavioral problems and sleep disturbance. Physical Findings   Temp 97 °F (36.1 °C) (Temporal)   Physical Exam     Assessment   No diagnosis found.     Plan        No orders of the defined types were placed in t

## 2021-01-20 NOTE — PROGRESS NOTES
Follow Up Visit Note       Active Problems      1. Prolapsed internal hemorrhoids, grade 3          Chief Complaint   Patient presents with:  Hemorrhoid Banding: 3rd banding.  pt denies concerns        History of Present Illness    The patient presents for No    Other Topics      Concerns:        Caffeine Concern: No        Exercise: No        Seat Belt: No        Special Diet: No        Stress Concern: No        Weight Concern: No    Social History Narrative      3 stepchildren , 1 biologic child.       Ralph Mobley performed with lubrication. The anal canal was well visualized. An internal hemorrhoid was identified and well visualized. The internal hemorrhoid was grasped well above the dentate line with the grasper.     The internal hemorrhoid was pulled with

## 2021-02-02 DIAGNOSIS — Z23 NEED FOR VACCINATION: ICD-10-CM

## 2021-02-04 ENCOUNTER — OFFICE VISIT (OUTPATIENT)
Dept: SURGERY | Facility: CLINIC | Age: 76
End: 2021-02-04
Payer: MEDICARE

## 2021-02-04 VITALS — HEIGHT: 70 IN | WEIGHT: 198 LBS | TEMPERATURE: 97 F | BODY MASS INDEX: 28.35 KG/M2

## 2021-02-04 DIAGNOSIS — K64.2 PROLAPSED INTERNAL HEMORRHOIDS, GRADE 3: Primary | ICD-10-CM

## 2021-02-04 PROCEDURE — 99212 OFFICE O/P EST SF 10 MIN: CPT | Performed by: SURGERY

## 2021-02-04 PROCEDURE — 46600 DIAGNOSTIC ANOSCOPY SPX: CPT | Performed by: SURGERY

## 2021-02-04 NOTE — PROGRESS NOTES
Follow Up Visit Note       Active Problems      1. Prolapsed internal hemorrhoids, grade 3          Chief Complaint   Patient presents with:  Hemorrhoid Banding: EP 4TH BANDING- PT states is having normal BM's denies pain or bleeding.  PT states no concerns Mother         death   • Diabetes Neg      Social History    Socioeconomic History      Marital status:       Spouse name: Not on file      Number of children: 4      Years of education: Not on file      Highest education level: Not on file    Occup nosebleeds, sore throat and trouble swallowing. Respiratory: Negative for apnea, cough, shortness of breath and wheezing. Cardiovascular: Negative for chest pain, palpitations and leg swelling.    Gastrointestinal: Negative for abdominal distention, a diagnosis)    Plan     · The patient is recovering nicely following rubber band ligation and phenol injection of internal hemorrhoids. ·   · The anticipated postoperative recovery was discussed with the patient in detail.   ·   · Dietary, activity, and exe

## 2021-03-09 DIAGNOSIS — Z95.1 S/P CABG X 4: ICD-10-CM

## 2021-03-09 DIAGNOSIS — I10 ESSENTIAL HYPERTENSION: ICD-10-CM

## 2021-03-09 DIAGNOSIS — E78.2 MIXED HYPERLIPIDEMIA: ICD-10-CM

## 2021-03-09 DIAGNOSIS — I25.10 CORONARY ARTERY DISEASE INVOLVING NATIVE CORONARY ARTERY OF NATIVE HEART WITHOUT ANGINA PECTORIS: ICD-10-CM

## 2021-03-09 RX ORDER — METOPROLOL SUCCINATE 50 MG/1
TABLET, EXTENDED RELEASE ORAL
Qty: 180 TABLET | Refills: 0 | Status: SHIPPED | OUTPATIENT
Start: 2021-03-09 | End: 2021-09-16

## 2021-03-09 RX ORDER — ATORVASTATIN CALCIUM 40 MG/1
TABLET, FILM COATED ORAL
Qty: 90 TABLET | Refills: 0 | Status: SHIPPED | OUTPATIENT
Start: 2021-03-09 | End: 2021-04-15

## 2021-03-09 RX ORDER — CLOPIDOGREL BISULFATE 75 MG/1
TABLET ORAL
Qty: 90 TABLET | Refills: 0 | Status: SHIPPED | OUTPATIENT
Start: 2021-03-09 | End: 2021-09-16

## 2021-03-09 NOTE — TELEPHONE ENCOUNTER
Last time medication was refilled 1/17/21  Quantity and # of refills 90 tab w/ 1 refill  Last OV 9/22/20  Next OV physical due 9/22/21

## 2021-03-15 DIAGNOSIS — E11.9 TYPE 2 DIABETES MELLITUS WITHOUT COMPLICATION, WITHOUT LONG-TERM CURRENT USE OF INSULIN (HCC): ICD-10-CM

## 2021-03-15 NOTE — TELEPHONE ENCOUNTER
Received refill request for    metFormin    Pharmacy    [facility] [id number], [address] [phone], [phone]    Fax in triage room

## 2021-04-15 ENCOUNTER — TELEPHONE (OUTPATIENT)
Dept: INTERNAL MEDICINE CLINIC | Facility: CLINIC | Age: 76
End: 2021-04-15

## 2021-04-15 DIAGNOSIS — E78.2 MIXED HYPERLIPIDEMIA: ICD-10-CM

## 2021-04-15 DIAGNOSIS — E11.9 TYPE 2 DIABETES MELLITUS WITHOUT COMPLICATION, WITHOUT LONG-TERM CURRENT USE OF INSULIN (HCC): ICD-10-CM

## 2021-04-15 DIAGNOSIS — Z12.5 ENCOUNTER FOR SCREENING FOR MALIGNANT NEOPLASM OF PROSTATE: ICD-10-CM

## 2021-04-15 DIAGNOSIS — Z00.00 ANNUAL PHYSICAL EXAM: Primary | ICD-10-CM

## 2021-04-15 RX ORDER — ATORVASTATIN CALCIUM 40 MG/1
TABLET, FILM COATED ORAL
Qty: 90 TABLET | Refills: 0 | Status: SHIPPED | OUTPATIENT
Start: 2021-04-15 | End: 2021-09-16

## 2021-04-20 DIAGNOSIS — E03.8 HYPOTHYROIDISM DUE TO HASHIMOTO'S THYROIDITIS: ICD-10-CM

## 2021-04-20 DIAGNOSIS — E11.9 TYPE 2 DIABETES MELLITUS WITHOUT COMPLICATION, WITHOUT LONG-TERM CURRENT USE OF INSULIN (HCC): ICD-10-CM

## 2021-04-20 DIAGNOSIS — E06.3 HYPOTHYROIDISM DUE TO HASHIMOTO'S THYROIDITIS: ICD-10-CM

## 2021-04-20 RX ORDER — LEVOTHYROXINE SODIUM 0.12 MG/1
125 TABLET ORAL
Qty: 30 TABLET | Refills: 0 | Status: SHIPPED | OUTPATIENT
Start: 2021-04-20 | End: 2021-04-29

## 2021-04-20 NOTE — TELEPHONE ENCOUNTER
Refill Req    Levothyroxine Sodium 125 MCG Oral Tab  Qty: 90    metFORMIN HCl 850 MG Oral Tab  Qty: 180    Please send to [facility]

## 2021-04-22 ENCOUNTER — TELEPHONE (OUTPATIENT)
Dept: INTERNAL MEDICINE CLINIC | Facility: CLINIC | Age: 76
End: 2021-04-22

## 2021-04-22 DIAGNOSIS — E11.9 TYPE 2 DIABETES MELLITUS WITHOUT COMPLICATION, WITHOUT LONG-TERM CURRENT USE OF INSULIN (HCC): ICD-10-CM

## 2021-04-22 DIAGNOSIS — E03.8 HYPOTHYROIDISM DUE TO HASHIMOTO'S THYROIDITIS: ICD-10-CM

## 2021-04-22 DIAGNOSIS — E06.3 HYPOTHYROIDISM DUE TO HASHIMOTO'S THYROIDITIS: ICD-10-CM

## 2021-04-22 RX ORDER — LEVOTHYROXINE SODIUM 0.12 MG/1
125 TABLET ORAL
Qty: 90 TABLET | Refills: 0 | Status: CANCELLED | OUTPATIENT
Start: 2021-04-22

## 2021-04-29 DIAGNOSIS — E03.8 HYPOTHYROIDISM DUE TO HASHIMOTO'S THYROIDITIS: ICD-10-CM

## 2021-04-29 DIAGNOSIS — E11.9 TYPE 2 DIABETES MELLITUS WITHOUT COMPLICATION, WITHOUT LONG-TERM CURRENT USE OF INSULIN (HCC): ICD-10-CM

## 2021-04-29 DIAGNOSIS — E06.3 HYPOTHYROIDISM DUE TO HASHIMOTO'S THYROIDITIS: ICD-10-CM

## 2021-04-29 RX ORDER — LEVOTHYROXINE SODIUM 0.12 MG/1
TABLET ORAL
Qty: 30 TABLET | Refills: 0 | Status: SHIPPED | OUTPATIENT
Start: 2021-04-29 | End: 2021-04-29

## 2021-04-29 RX ORDER — LEVOTHYROXINE SODIUM 0.12 MG/1
125 TABLET ORAL
Qty: 90 TABLET | Refills: 0 | Status: SHIPPED | OUTPATIENT
Start: 2021-04-29 | End: 2021-09-03

## 2021-04-29 NOTE — TELEPHONE ENCOUNTER
Refill Req, Please send to [facility], [place]:     metFORMIN HCl 850 MG Oral Tab  90 Day supply    LEVOTHYROXINE SODIUM 125 MCG Oral Tab  90 Day Supply

## 2021-05-04 ENCOUNTER — TELEPHONE (OUTPATIENT)
Dept: INTERNAL MEDICINE CLINIC | Facility: CLINIC | Age: 76
End: 2021-05-04

## 2021-05-04 DIAGNOSIS — E06.3 HYPOTHYROIDISM DUE TO HASHIMOTO'S THYROIDITIS: ICD-10-CM

## 2021-05-04 DIAGNOSIS — E03.8 HYPOTHYROIDISM DUE TO HASHIMOTO'S THYROIDITIS: ICD-10-CM

## 2021-05-04 RX ORDER — LEVOTHYROXINE SODIUM 0.12 MG/1
125 TABLET ORAL
Qty: 90 TABLET | Refills: 0 | OUTPATIENT
Start: 2021-05-04

## 2021-05-04 NOTE — TELEPHONE ENCOUNTER
Patient called and stated he received a 30 day supply for his Levothyroxine, and he is going on a trip, and needs 90 days. Please call [place] on file because pharmacy told patient they didn't receive the 90 day supply. Please advise.

## 2021-05-04 NOTE — TELEPHONE ENCOUNTER
Spoke to Eliezer Vaca from [facility] who informed me patient picked up a 30 day supply on 4/20/21 and once patient is due for a refill he can initiate the medicaion with the pharmacy  As he has a 90 day supply available and will be dispensed if insurance allo

## 2021-07-26 DIAGNOSIS — E11.9 TYPE 2 DIABETES MELLITUS WITHOUT COMPLICATION, WITHOUT LONG-TERM CURRENT USE OF INSULIN (HCC): ICD-10-CM

## 2021-08-25 ENCOUNTER — LAB ENCOUNTER (OUTPATIENT)
Dept: LAB | Age: 76
End: 2021-08-25
Attending: INTERNAL MEDICINE
Payer: MEDICARE

## 2021-08-25 DIAGNOSIS — Z00.00 ANNUAL PHYSICAL EXAM: ICD-10-CM

## 2021-08-25 LAB
ALBUMIN SERPL-MCNC: 4.1 G/DL (ref 3.4–5)
ALBUMIN/GLOB SERPL: 1.2 {RATIO} (ref 1–2)
ALP LIVER SERPL-CCNC: 89 U/L
ALT SERPL-CCNC: 29 U/L
ANION GAP SERPL CALC-SCNC: 6 MMOL/L (ref 0–18)
AST SERPL-CCNC: 21 U/L (ref 15–37)
BILIRUB SERPL-MCNC: 0.9 MG/DL (ref 0.1–2)
BILIRUB UR QL STRIP.AUTO: NEGATIVE
BUN BLD-MCNC: 20 MG/DL (ref 7–18)
CALCIUM BLD-MCNC: 9 MG/DL (ref 8.5–10.1)
CHLORIDE SERPL-SCNC: 109 MMOL/L (ref 98–112)
CHOLEST SMN-MCNC: 128 MG/DL (ref ?–200)
CLARITY UR REFRACT.AUTO: CLEAR
CO2 SERPL-SCNC: 24 MMOL/L (ref 21–32)
COLOR UR AUTO: YELLOW
CREAT BLD-MCNC: 1.35 MG/DL
EST. AVERAGE GLUCOSE BLD GHB EST-MCNC: 157 MG/DL (ref 68–126)
GLOBULIN PLAS-MCNC: 3.5 G/DL (ref 2.8–4.4)
GLUCOSE BLD-MCNC: 120 MG/DL (ref 70–99)
GLUCOSE UR STRIP.AUTO-MCNC: NEGATIVE MG/DL
HBA1C MFR BLD HPLC: 7.1 % (ref ?–5.7)
HDLC SERPL-MCNC: 33 MG/DL (ref 40–59)
KETONES UR STRIP.AUTO-MCNC: NEGATIVE MG/DL
LDLC SERPL CALC-MCNC: 67 MG/DL (ref ?–100)
LEUKOCYTE ESTERASE UR QL STRIP.AUTO: NEGATIVE
M PROTEIN MFR SERPL ELPH: 7.6 G/DL (ref 6.4–8.2)
NITRITE UR QL STRIP.AUTO: NEGATIVE
NONHDLC SERPL-MCNC: 95 MG/DL (ref ?–130)
OSMOLALITY SERPL CALC.SUM OF ELEC: 292 MOSM/KG (ref 275–295)
PATIENT FASTING Y/N/NP: YES
PATIENT FASTING Y/N/NP: YES
PH UR STRIP.AUTO: 6 [PH] (ref 5–8)
POTASSIUM SERPL-SCNC: 4.1 MMOL/L (ref 3.5–5.1)
PROT UR STRIP.AUTO-MCNC: 30 MG/DL
RBC UR QL AUTO: NEGATIVE
SODIUM SERPL-SCNC: 139 MMOL/L (ref 136–145)
SP GR UR STRIP.AUTO: 1.02 (ref 1–1.03)
TRIGL SERPL-MCNC: 163 MG/DL (ref 30–149)
TSI SER-ACNC: 1.56 MIU/ML (ref 0.36–3.74)
UROBILINOGEN UR STRIP.AUTO-MCNC: <2 MG/DL
VLDLC SERPL CALC-MCNC: 25 MG/DL (ref 0–30)

## 2021-08-25 PROCEDURE — 84443 ASSAY THYROID STIM HORMONE: CPT

## 2021-08-25 PROCEDURE — 80061 LIPID PANEL: CPT

## 2021-08-25 PROCEDURE — 81001 URINALYSIS AUTO W/SCOPE: CPT

## 2021-08-25 PROCEDURE — 83036 HEMOGLOBIN GLYCOSYLATED A1C: CPT

## 2021-08-25 PROCEDURE — 36415 COLL VENOUS BLD VENIPUNCTURE: CPT

## 2021-08-25 PROCEDURE — 80053 COMPREHEN METABOLIC PANEL: CPT

## 2021-09-03 ENCOUNTER — OFFICE VISIT (OUTPATIENT)
Dept: INTERNAL MEDICINE CLINIC | Facility: CLINIC | Age: 76
End: 2021-09-03
Payer: MEDICARE

## 2021-09-03 VITALS
OXYGEN SATURATION: 99 % | DIASTOLIC BLOOD PRESSURE: 74 MMHG | TEMPERATURE: 98 F | HEIGHT: 70 IN | WEIGHT: 211 LBS | SYSTOLIC BLOOD PRESSURE: 122 MMHG | BODY MASS INDEX: 30.21 KG/M2 | HEART RATE: 78 BPM | RESPIRATION RATE: 16 BRPM

## 2021-09-03 DIAGNOSIS — E78.2 MIXED HYPERLIPIDEMIA: ICD-10-CM

## 2021-09-03 DIAGNOSIS — I10 ESSENTIAL HYPERTENSION: ICD-10-CM

## 2021-09-03 DIAGNOSIS — Z12.5 PROSTATE CANCER SCREENING: ICD-10-CM

## 2021-09-03 DIAGNOSIS — Z00.00 ANNUAL PHYSICAL EXAM: Primary | ICD-10-CM

## 2021-09-03 DIAGNOSIS — E11.65 TYPE 2 DIABETES MELLITUS WITH HYPERGLYCEMIA, WITHOUT LONG-TERM CURRENT USE OF INSULIN (HCC): ICD-10-CM

## 2021-09-03 DIAGNOSIS — I25.10 CORONARY ARTERY DISEASE INVOLVING NATIVE CORONARY ARTERY OF NATIVE HEART WITHOUT ANGINA PECTORIS: ICD-10-CM

## 2021-09-03 DIAGNOSIS — E03.8 HYPOTHYROIDISM DUE TO HASHIMOTO'S THYROIDITIS: ICD-10-CM

## 2021-09-03 DIAGNOSIS — Z00.00 ENCOUNTER FOR ANNUAL HEALTH EXAMINATION: ICD-10-CM

## 2021-09-03 DIAGNOSIS — Z95.1 S/P CABG X 4: ICD-10-CM

## 2021-09-03 DIAGNOSIS — E06.3 HYPOTHYROIDISM DUE TO HASHIMOTO'S THYROIDITIS: ICD-10-CM

## 2021-09-03 PROBLEM — K63.5 POLYP OF COLON: Status: RESOLVED | Noted: 2020-10-13 | Resolved: 2021-09-03

## 2021-09-03 PROBLEM — D12.0 BENIGN NEOPLASM OF CECUM: Status: RESOLVED | Noted: 2020-10-13 | Resolved: 2021-09-03

## 2021-09-03 PROBLEM — D50.9 ANEMIA, IRON DEFICIENCY: Status: RESOLVED | Noted: 2020-10-13 | Resolved: 2021-09-03

## 2021-09-03 PROBLEM — K64.2 PROLAPSED INTERNAL HEMORRHOIDS, GRADE 3: Status: RESOLVED | Noted: 2021-01-07 | Resolved: 2021-09-03

## 2021-09-03 PROBLEM — R19.5 ABNORMAL FECES: Status: RESOLVED | Noted: 2020-10-13 | Resolved: 2021-09-03

## 2021-09-03 PROCEDURE — G0439 PPPS, SUBSEQ VISIT: HCPCS | Performed by: INTERNAL MEDICINE

## 2021-09-03 RX ORDER — LEVOTHYROXINE SODIUM 0.12 MG/1
125 TABLET ORAL
Qty: 90 TABLET | Refills: 3 | Status: SHIPPED | OUTPATIENT
Start: 2021-09-03

## 2021-09-03 NOTE — PROGRESS NOTES
HPI:   Benito Mayer is a 77year old male who presents for a Medicare Subsequent Annual Wellness visit (Pt already had Initial Annual Wellness).     HPI:  Here for AWV  No complaints  Normal state of health    PAST MEDICAL, SOCIAL, [address] [facility] Hashimoto's thyroiditis     Type 2 diabetes mellitus with hyperglycemia, without long-term current use of insulin (HCC)    Wt Readings from Last 3 Encounters:  02/04/21 : 198 lb (89.8 kg)  12/10/20 : 198 lb (89.8 kg)  10/08/20 : 198 lb (89.8 kg)     Last  artery (6/20/2017), and Wears glasses (6/1/2004). He  has a past surgical history that includes other (2000); cabg (2002); colonoscopy (2008); hernia surgery (2000); and tonsillectomy (1954).     His family history includes Asthma in his father; Heart At symmetric, no tenderness/mass/nodules, no carotid bruit or JVD   Back:   Symmetric, no curvature, ROM normal, no CVA tenderness   Lungs:   Clear to auscultation bilaterally, respirations unlabored   Chest Wall:  No tenderness or deformity   Heart:  Regular assessment: good     PLAN:       Mixed hyperlipidemia-controlled. Cont current med therapy     Coronary artery disease involving native coronary artery of native heart without angina pectoris-stable.  continue control of cad risk factors     S/P CABG x 4-st Panel  Cholesterol  Lipoprotein (HDL)  Triglycerides Covered every 5 years for all Medicare beneficiaries without apparent signs or symptoms of cardiovascular disease Lab Results   Component Value Date    CHOLEST 128 08/25/2021    HDL 33 (L) 08/25/2021 this time       Diabetes      Hemoglobin A1C Annually; if last result is elevated, may be asked to retest more frequently.     Medicare covers every 3 months Lab Results   Component Value Date     (H) 08/25/2021    A1C 7.1 (H) 08/25/2021       No rec

## 2021-09-03 NOTE — PATIENT INSTRUCTIONS
Diabetes: Activity Tips    Being more active can help you manage your diabetes. The tips on this sheet can help you get the most from your exercise. They can also help you stay safe.    Staying active   It’s important for adults to spend less time sitting being active. Test your blood sugar before exercising if you take insulin or medicine that can cause low blood sugar. And carry a fast-acting sugar that will raise your blood sugar level quickly. This includes glucose tablets or glucose gel in a tube.  Use Screening    Fasting Blood Sugar / Glucose    One screening every 12 months if never tested or if previously tested but not diagnosed with pre-diabetes   One screening every 6 months if diagnosed with pre-diabetes Lab Results   Component Value Date    GLU medically necessary (cut with metal); may be covered with your pharmacy prescription benefits -    Tetanus, Diptheria and Pertusis TD and TDaP Not covered by Medicare Part B -  No recommendations at this time    Zoster Not covered by Medicare Part B; may b

## 2021-09-16 DIAGNOSIS — E78.2 MIXED HYPERLIPIDEMIA: ICD-10-CM

## 2021-09-16 DIAGNOSIS — Z95.1 S/P CABG X 4: ICD-10-CM

## 2021-09-16 DIAGNOSIS — I25.10 CORONARY ARTERY DISEASE INVOLVING NATIVE CORONARY ARTERY OF NATIVE HEART WITHOUT ANGINA PECTORIS: ICD-10-CM

## 2021-09-16 DIAGNOSIS — E11.9 TYPE 2 DIABETES MELLITUS WITHOUT COMPLICATION, WITHOUT LONG-TERM CURRENT USE OF INSULIN (HCC): ICD-10-CM

## 2021-09-16 DIAGNOSIS — I10 ESSENTIAL HYPERTENSION: ICD-10-CM

## 2021-09-16 RX ORDER — ATORVASTATIN CALCIUM 40 MG/1
40 TABLET, FILM COATED ORAL DAILY
Qty: 90 TABLET | Refills: 0 | Status: SHIPPED | OUTPATIENT
Start: 2021-09-16 | End: 2021-12-12

## 2021-09-16 RX ORDER — CLOPIDOGREL BISULFATE 75 MG/1
75 TABLET ORAL DAILY
Qty: 90 TABLET | Refills: 0 | Status: SHIPPED | OUTPATIENT
Start: 2021-09-16 | End: 2021-12-13

## 2021-09-16 RX ORDER — METOPROLOL SUCCINATE 50 MG/1
50 TABLET, EXTENDED RELEASE ORAL 2 TIMES DAILY
Qty: 180 TABLET | Refills: 0 | Status: SHIPPED | OUTPATIENT
Start: 2021-09-16 | End: 2021-12-12

## 2021-09-24 ENCOUNTER — LAB ENCOUNTER (OUTPATIENT)
Dept: LAB | Age: 76
End: 2021-09-24
Attending: INTERNAL MEDICINE
Payer: MEDICARE

## 2021-09-24 DIAGNOSIS — Z12.5 PROSTATE CANCER SCREENING: ICD-10-CM

## 2021-09-24 LAB — COMPLEXED PSA SERPL-MCNC: 0.32 NG/ML (ref ?–4)

## 2021-09-24 PROCEDURE — 36415 COLL VENOUS BLD VENIPUNCTURE: CPT

## 2021-11-09 ENCOUNTER — MED REC SCAN ONLY (OUTPATIENT)
Dept: INTERNAL MEDICINE CLINIC | Facility: CLINIC | Age: 76
End: 2021-11-09

## 2021-12-12 DIAGNOSIS — E11.9 TYPE 2 DIABETES MELLITUS WITHOUT COMPLICATION, WITHOUT LONG-TERM CURRENT USE OF INSULIN (HCC): ICD-10-CM

## 2021-12-12 DIAGNOSIS — I25.10 CORONARY ARTERY DISEASE INVOLVING NATIVE CORONARY ARTERY OF NATIVE HEART WITHOUT ANGINA PECTORIS: ICD-10-CM

## 2021-12-12 DIAGNOSIS — I10 ESSENTIAL HYPERTENSION: ICD-10-CM

## 2021-12-12 DIAGNOSIS — E78.2 MIXED HYPERLIPIDEMIA: ICD-10-CM

## 2021-12-12 DIAGNOSIS — Z95.1 S/P CABG X 4: ICD-10-CM

## 2021-12-12 RX ORDER — METOPROLOL SUCCINATE 50 MG/1
TABLET, EXTENDED RELEASE ORAL
Qty: 180 TABLET | Refills: 0 | Status: SHIPPED | OUTPATIENT
Start: 2021-12-12

## 2021-12-12 RX ORDER — ATORVASTATIN CALCIUM 40 MG/1
TABLET, FILM COATED ORAL
Qty: 90 TABLET | Refills: 0 | Status: SHIPPED | OUTPATIENT
Start: 2021-12-12

## 2021-12-13 RX ORDER — CLOPIDOGREL BISULFATE 75 MG/1
75 TABLET ORAL DAILY
Qty: 90 TABLET | Refills: 0 | Status: SHIPPED | OUTPATIENT
Start: 2021-12-13

## 2022-04-02 RX ORDER — METOPROLOL SUCCINATE 50 MG/1
50 TABLET, EXTENDED RELEASE ORAL 2 TIMES DAILY
Qty: 180 TABLET | Refills: 0 | Status: CANCELLED | OUTPATIENT
Start: 2022-04-02

## 2022-04-02 RX ORDER — ATORVASTATIN CALCIUM 40 MG/1
40 TABLET, FILM COATED ORAL DAILY
Qty: 90 TABLET | Refills: 0 | Status: CANCELLED | OUTPATIENT
Start: 2022-04-02

## 2022-04-02 RX ORDER — CLOPIDOGREL BISULFATE 75 MG/1
75 TABLET ORAL DAILY
Qty: 90 TABLET | Refills: 0 | Status: CANCELLED | OUTPATIENT
Start: 2022-04-02

## 2022-04-04 RX ORDER — LEVOTHYROXINE SODIUM 0.12 MG/1
125 TABLET ORAL
Qty: 90 TABLET | Refills: 3 | Status: SHIPPED | OUTPATIENT
Start: 2022-04-04

## 2022-04-04 RX ORDER — CLOPIDOGREL BISULFATE 75 MG/1
TABLET ORAL
Qty: 90 TABLET | Refills: 1 | Status: SHIPPED | OUTPATIENT
Start: 2022-04-04

## 2022-04-04 RX ORDER — METOPROLOL SUCCINATE 50 MG/1
TABLET, EXTENDED RELEASE ORAL
Qty: 180 TABLET | Refills: 1 | Status: SHIPPED | OUTPATIENT
Start: 2022-04-04

## 2022-04-04 RX ORDER — ATORVASTATIN CALCIUM 40 MG/1
TABLET, FILM COATED ORAL
Qty: 90 TABLET | Refills: 1 | Status: SHIPPED | OUTPATIENT
Start: 2022-04-04

## 2022-04-04 NOTE — TELEPHONE ENCOUNTER
Last time medication was refilled 12/12/21  Quantity and # of refills 180/0  Last OV 9/3/21  Next OV overdue since 3/3/22

## 2022-04-04 NOTE — TELEPHONE ENCOUNTER
Last time medication was refilled 12/12/21  Quantity and # of refills 90/0  Last OV 9/3/21  Next OV overdue since 3/3/22

## 2022-04-20 ENCOUNTER — TELEPHONE (OUTPATIENT)
Dept: INTERNAL MEDICINE CLINIC | Facility: CLINIC | Age: 77
End: 2022-04-20

## 2022-04-20 NOTE — TELEPHONE ENCOUNTER
Called patient and notified PSA screen completed 9/24 and was normal, low risk for prostate cancer. Reminded patient he is due for f/u visit with PCP. Patient agreed to schedule appt. appt scheduled. Lab orders placed for 6 month per protocol.            Future Appointments   Date Time Provider Tripp Steward   5/12/2022  1:45 PM Remigio Holley MD [facility] [address] [facility] [address]

## 2022-04-20 NOTE — TELEPHONE ENCOUNTER
Patient stated that he thought he was to get PSA lab done around 4/15. He just realized he missed the date and order is . If lab is needed, please re-enter order and patient would like to know when order is placed. Awake/Alert

## 2022-05-09 ENCOUNTER — LAB ENCOUNTER (OUTPATIENT)
Dept: LAB | Age: 77
End: 2022-05-09
Attending: INTERNAL MEDICINE
Payer: MEDICARE

## 2022-05-09 DIAGNOSIS — I10 ESSENTIAL HYPERTENSION: ICD-10-CM

## 2022-05-09 DIAGNOSIS — E11.9 TYPE 2 DIABETES MELLITUS WITHOUT COMPLICATION, WITHOUT LONG-TERM CURRENT USE OF INSULIN (HCC): ICD-10-CM

## 2022-05-09 DIAGNOSIS — E03.9 HYPOTHYROIDISM, UNSPECIFIED TYPE: ICD-10-CM

## 2022-05-09 LAB
ALBUMIN SERPL-MCNC: 3.9 G/DL (ref 3.4–5)
ALBUMIN/GLOB SERPL: 1.3 {RATIO} (ref 1–2)
ALP LIVER SERPL-CCNC: 85 U/L
ALT SERPL-CCNC: 36 U/L
ANION GAP SERPL CALC-SCNC: 6 MMOL/L (ref 0–18)
AST SERPL-CCNC: 24 U/L (ref 15–37)
BILIRUB SERPL-MCNC: 0.7 MG/DL (ref 0.1–2)
BILIRUB UR QL STRIP.AUTO: NEGATIVE
BUN BLD-MCNC: 23 MG/DL (ref 7–18)
CALCIUM BLD-MCNC: 8.8 MG/DL (ref 8.5–10.1)
CHLORIDE SERPL-SCNC: 108 MMOL/L (ref 98–112)
CHOLEST SERPL-MCNC: 113 MG/DL (ref ?–200)
CLARITY UR REFRACT.AUTO: CLEAR
CO2 SERPL-SCNC: 24 MMOL/L (ref 21–32)
COLOR UR AUTO: YELLOW
CREAT BLD-MCNC: 1.39 MG/DL
CREAT UR-SCNC: 200 MG/DL
EST. AVERAGE GLUCOSE BLD GHB EST-MCNC: 157 MG/DL (ref 68–126)
FASTING PATIENT LIPID ANSWER: YES
FASTING STATUS PATIENT QL REPORTED: YES
GLOBULIN PLAS-MCNC: 3 G/DL (ref 2.8–4.4)
GLUCOSE BLD-MCNC: 135 MG/DL (ref 70–99)
GLUCOSE UR STRIP.AUTO-MCNC: NEGATIVE MG/DL
HBA1C MFR BLD: 7.1 % (ref ?–5.7)
HDLC SERPL-MCNC: 34 MG/DL (ref 40–59)
KETONES UR STRIP.AUTO-MCNC: NEGATIVE MG/DL
LDLC SERPL CALC-MCNC: 56 MG/DL (ref ?–100)
LEUKOCYTE ESTERASE UR QL STRIP.AUTO: NEGATIVE
MICROALBUMIN UR-MCNC: 1.27 MG/DL
MICROALBUMIN/CREAT 24H UR-RTO: 6.4 UG/MG (ref ?–30)
NITRITE UR QL STRIP.AUTO: NEGATIVE
NONHDLC SERPL-MCNC: 79 MG/DL (ref ?–130)
OSMOLALITY SERPL CALC.SUM OF ELEC: 292 MOSM/KG (ref 275–295)
PH UR STRIP.AUTO: 5 [PH] (ref 5–8)
POTASSIUM SERPL-SCNC: 4.4 MMOL/L (ref 3.5–5.1)
PROT SERPL-MCNC: 6.9 G/DL (ref 6.4–8.2)
PROT UR STRIP.AUTO-MCNC: NEGATIVE MG/DL
RBC UR QL AUTO: NEGATIVE
SODIUM SERPL-SCNC: 138 MMOL/L (ref 136–145)
SP GR UR STRIP.AUTO: 1.02 (ref 1–1.03)
TRIGL SERPL-MCNC: 131 MG/DL (ref 30–149)
TSI SER-ACNC: 1.24 MIU/ML (ref 0.36–3.74)
UROBILINOGEN UR STRIP.AUTO-MCNC: <2 MG/DL
VLDLC SERPL CALC-MCNC: 19 MG/DL (ref 0–30)

## 2022-05-09 PROCEDURE — 83036 HEMOGLOBIN GLYCOSYLATED A1C: CPT

## 2022-05-09 PROCEDURE — 84443 ASSAY THYROID STIM HORMONE: CPT

## 2022-05-09 PROCEDURE — 80053 COMPREHEN METABOLIC PANEL: CPT

## 2022-05-09 PROCEDURE — 82043 UR ALBUMIN QUANTITATIVE: CPT

## 2022-05-09 PROCEDURE — 36415 COLL VENOUS BLD VENIPUNCTURE: CPT

## 2022-05-09 PROCEDURE — 80061 LIPID PANEL: CPT

## 2022-05-09 PROCEDURE — 82570 ASSAY OF URINE CREATININE: CPT

## 2022-05-09 PROCEDURE — 81003 URINALYSIS AUTO W/O SCOPE: CPT

## 2022-05-12 ENCOUNTER — OFFICE VISIT (OUTPATIENT)
Dept: INTERNAL MEDICINE CLINIC | Facility: CLINIC | Age: 77
End: 2022-05-12
Payer: MEDICARE

## 2022-05-12 VITALS
WEIGHT: 202 LBS | OXYGEN SATURATION: 98 % | BODY MASS INDEX: 28.92 KG/M2 | SYSTOLIC BLOOD PRESSURE: 124 MMHG | HEIGHT: 70 IN | RESPIRATION RATE: 16 BRPM | DIASTOLIC BLOOD PRESSURE: 78 MMHG | HEART RATE: 71 BPM | TEMPERATURE: 98 F

## 2022-05-12 DIAGNOSIS — I25.10 CORONARY ARTERY DISEASE INVOLVING NATIVE CORONARY ARTERY OF NATIVE HEART WITHOUT ANGINA PECTORIS: ICD-10-CM

## 2022-05-12 DIAGNOSIS — E11.65 TYPE 2 DIABETES MELLITUS WITH HYPERGLYCEMIA, WITHOUT LONG-TERM CURRENT USE OF INSULIN (HCC): Primary | ICD-10-CM

## 2022-05-12 DIAGNOSIS — I10 ESSENTIAL HYPERTENSION: ICD-10-CM

## 2022-05-12 PROCEDURE — 99214 OFFICE O/P EST MOD 30 MIN: CPT | Performed by: INTERNAL MEDICINE

## 2022-05-18 ENCOUNTER — TELEPHONE (OUTPATIENT)
Dept: INTERNAL MEDICINE CLINIC | Facility: CLINIC | Age: 77
End: 2022-05-18

## 2022-05-18 ENCOUNTER — PATIENT MESSAGE (OUTPATIENT)
Dept: INTERNAL MEDICINE CLINIC | Facility: CLINIC | Age: 77
End: 2022-05-18

## 2022-05-18 ENCOUNTER — TELEMEDICINE (OUTPATIENT)
Dept: INTERNAL MEDICINE CLINIC | Facility: CLINIC | Age: 77
End: 2022-05-18

## 2022-05-18 DIAGNOSIS — U07.1 COVID-19: Primary | ICD-10-CM

## 2022-05-18 DIAGNOSIS — R05.9 COUGH IN ADULT: ICD-10-CM

## 2022-05-18 PROCEDURE — 99213 OFFICE O/P EST LOW 20 MIN: CPT

## 2022-05-18 NOTE — TELEPHONE ENCOUNTER
Resent letter drafted by Aurelia Kim via [employer]  Pt notified via [facility]

## 2022-05-18 NOTE — TELEPHONE ENCOUNTER
Patient spouse Nyla Hornalondra called in stating that Fredrick Castillo has tested positive for COVID today and that they were [place] to travel to [place] and is requesting that Dr. Janes Anne write a note today  stating the patient is positive for COVID so that they can get their money back from Insurance. Please reach out to patient spouse. Thank you.

## 2022-05-18 NOTE — TELEPHONE ENCOUNTER
From: Elvina Gowers  To: ALMA ROSA Winchester  Sent: 5/18/2022 1:58 PM CDT  Subject: Letter Regarding etouches,  If you could send the letter we need for canceling our [employer] trip we must cancel due to my Covid, it would be greatly appreciated. We need this ASAP in order to submit to the insurance company. Thanks so much!

## 2022-05-19 NOTE — TELEPHONE ENCOUNTER
From: Katerin Shirley  To: ALMA ROSA Diaz  Sent: 5/18/2022 6:21 PM CDT  Subject: Laddie Margarita Letter    Thanks so much for the letter. This second version looks great.      Mireya Oliveros

## 2022-06-07 ENCOUNTER — PATIENT MESSAGE (OUTPATIENT)
Dept: INTERNAL MEDICINE CLINIC | Facility: CLINIC | Age: 77
End: 2022-06-07

## 2022-06-08 ENCOUNTER — PATIENT MESSAGE (OUTPATIENT)
Dept: INTERNAL MEDICINE CLINIC | Facility: CLINIC | Age: 77
End: 2022-06-08

## 2022-06-08 NOTE — TELEPHONE ENCOUNTER
From: Nella Ulloa  To: ALMA ROSA Fisher  Sent: 6/7/2022 8:13 PM CDT  Subject: Medical Forms Needed to be Completed     Piedad Lambert,    On May 18 I tested positive for Covid-19. This was the morning we were to leave on the [employer]  trip that we then had to cancel. I did a call with you that day and prescribed the Paxlovid for me. I now have the medical forms that need to be completed in order for us to file the claim with [employer] for the $13,000 we had paid. Either my wife, Sera Mcrae or I will drop them off. If you could take the time to complete them it would be greatly appreciated! In order to save time, either Sera Mcrae or I will pick it up as soon as it's complete. If you could give us a quick call at [phone] when you've completed it, that would be greatly appreciated! Thanks so much for your help with this.     Best regards, Bull Fuentes

## 2022-06-09 NOTE — TELEPHONE ENCOUNTER
Contacted patient via phone and notified that forms are completed, attached VV notes from 5/18/22 and communication letter. Placed in [place] [id number]. and notified patient available for  at patient's convenience. Copies of form placed to scan.

## 2022-06-15 PROCEDURE — 88342 IMHCHEM/IMCYTCHM 1ST ANTB: CPT | Performed by: DERMATOLOGY

## 2022-06-23 ENCOUNTER — LAB REQUISITION (OUTPATIENT)
Dept: LAB | Facility: HOSPITAL | Age: 77
End: 2022-06-23
Payer: MEDICARE

## 2022-06-25 DIAGNOSIS — E11.9 TYPE 2 DIABETES MELLITUS WITHOUT COMPLICATION, WITHOUT LONG-TERM CURRENT USE OF INSULIN (HCC): ICD-10-CM

## 2022-07-05 ENCOUNTER — PATIENT MESSAGE (OUTPATIENT)
Dept: INTERNAL MEDICINE CLINIC | Facility: CLINIC | Age: 77
End: 2022-07-05

## 2022-07-05 NOTE — TELEPHONE ENCOUNTER
From: Guanako Martinez  To: Tabitha Ingram MD  Sent: 7/5/2022 1:23 PM CDT  Subject: Ale Roe,    On May 18 I tested positive for Covid. As such, should I get the second covid booster at this point. Please advise. Thank you!     Kay Hu

## 2022-07-19 ENCOUNTER — MED REC SCAN ONLY (OUTPATIENT)
Dept: INTERNAL MEDICINE CLINIC | Facility: CLINIC | Age: 77
End: 2022-07-19

## 2022-09-15 ENCOUNTER — TELEPHONE (OUTPATIENT)
Dept: INTERNAL MEDICINE CLINIC | Facility: CLINIC | Age: 77
End: 2022-09-15

## 2022-09-15 DIAGNOSIS — Z12.5 PROSTATE CANCER SCREENING: ICD-10-CM

## 2022-09-15 DIAGNOSIS — E11.9 TYPE 2 DIABETES MELLITUS WITHOUT COMPLICATION, WITHOUT LONG-TERM CURRENT USE OF INSULIN (HCC): Primary | ICD-10-CM

## 2022-09-15 DIAGNOSIS — I10 ESSENTIAL HYPERTENSION: ICD-10-CM

## 2022-09-15 DIAGNOSIS — I25.10 CORONARY ARTERY DISEASE INVOLVING NATIVE CORONARY ARTERY OF NATIVE HEART WITHOUT ANGINA PECTORIS: ICD-10-CM

## 2022-09-22 DIAGNOSIS — E78.2 MIXED HYPERLIPIDEMIA: ICD-10-CM

## 2022-09-22 DIAGNOSIS — I25.10 CORONARY ARTERY DISEASE INVOLVING NATIVE CORONARY ARTERY OF NATIVE HEART WITHOUT ANGINA PECTORIS: ICD-10-CM

## 2022-09-22 DIAGNOSIS — Z95.1 S/P CABG X 4: ICD-10-CM

## 2022-09-22 DIAGNOSIS — I10 ESSENTIAL HYPERTENSION: ICD-10-CM

## 2022-09-22 RX ORDER — METOPROLOL SUCCINATE 50 MG/1
TABLET, EXTENDED RELEASE ORAL
Qty: 180 TABLET | Refills: 0 | Status: SHIPPED | OUTPATIENT
Start: 2022-09-22

## 2022-09-22 RX ORDER — ATORVASTATIN CALCIUM 40 MG/1
TABLET, FILM COATED ORAL
Qty: 90 TABLET | Refills: 0 | Status: SHIPPED | OUTPATIENT
Start: 2022-09-22

## 2022-09-22 RX ORDER — CLOPIDOGREL BISULFATE 75 MG/1
TABLET ORAL
Qty: 90 TABLET | Refills: 0 | Status: SHIPPED | OUTPATIENT
Start: 2022-09-22

## 2022-09-22 NOTE — TELEPHONE ENCOUNTER
Clopidogrel 75 mg tab  Last time medication was refilled 6/25/22  Quantity and # of refills 90 tab no refill  Last OV VV 5/18 OV 5/12/22  Next OV 11/7/22

## 2022-10-29 ENCOUNTER — LAB ENCOUNTER (OUTPATIENT)
Dept: LAB | Age: 77
End: 2022-10-29
Attending: INTERNAL MEDICINE
Payer: MEDICARE

## 2022-10-29 DIAGNOSIS — I10 ESSENTIAL HYPERTENSION: ICD-10-CM

## 2022-10-29 DIAGNOSIS — Z12.5 PROSTATE CANCER SCREENING: ICD-10-CM

## 2022-10-29 DIAGNOSIS — I25.10 CORONARY ARTERY DISEASE INVOLVING NATIVE CORONARY ARTERY OF NATIVE HEART WITHOUT ANGINA PECTORIS: ICD-10-CM

## 2022-10-29 DIAGNOSIS — E11.9 TYPE 2 DIABETES MELLITUS WITHOUT COMPLICATION, WITHOUT LONG-TERM CURRENT USE OF INSULIN (HCC): ICD-10-CM

## 2022-10-29 LAB
ALBUMIN SERPL-MCNC: 3.9 G/DL (ref 3.4–5)
ALBUMIN/GLOB SERPL: 1.2 {RATIO} (ref 1–2)
ALP LIVER SERPL-CCNC: 69 U/L
ALT SERPL-CCNC: 35 U/L
ANION GAP SERPL CALC-SCNC: 7 MMOL/L (ref 0–18)
AST SERPL-CCNC: 24 U/L (ref 15–37)
BASOPHILS # BLD AUTO: 0.02 X10(3) UL (ref 0–0.2)
BASOPHILS NFR BLD AUTO: 0.4 %
BILIRUB SERPL-MCNC: 0.8 MG/DL (ref 0.1–2)
BILIRUB UR QL STRIP.AUTO: NEGATIVE
BUN BLD-MCNC: 18 MG/DL (ref 7–18)
CALCIUM BLD-MCNC: 9.3 MG/DL (ref 8.5–10.1)
CHLORIDE SERPL-SCNC: 107 MMOL/L (ref 98–112)
CHOLEST SERPL-MCNC: 107 MG/DL (ref ?–200)
CLARITY UR REFRACT.AUTO: CLEAR
CO2 SERPL-SCNC: 26 MMOL/L (ref 21–32)
COLOR UR AUTO: YELLOW
COMPLEXED PSA SERPL-MCNC: 0.3 NG/ML (ref ?–4)
CREAT BLD-MCNC: 1.17 MG/DL
CREAT UR-SCNC: 186 MG/DL
EOSINOPHIL # BLD AUTO: 0.12 X10(3) UL (ref 0–0.7)
EOSINOPHIL NFR BLD AUTO: 2.4 %
ERYTHROCYTE [DISTWIDTH] IN BLOOD BY AUTOMATED COUNT: 19.2 %
EST. AVERAGE GLUCOSE BLD GHB EST-MCNC: 154 MG/DL (ref 68–126)
FASTING PATIENT LIPID ANSWER: YES
FASTING STATUS PATIENT QL REPORTED: YES
GFR SERPLBLD BASED ON 1.73 SQ M-ARVRAT: 65 ML/MIN/1.73M2 (ref 60–?)
GLOBULIN PLAS-MCNC: 3.3 G/DL (ref 2.8–4.4)
GLUCOSE BLD-MCNC: 135 MG/DL (ref 70–99)
GLUCOSE UR STRIP.AUTO-MCNC: NEGATIVE MG/DL
HBA1C MFR BLD: 7 % (ref ?–5.7)
HCT VFR BLD AUTO: 41.3 %
HDLC SERPL-MCNC: 36 MG/DL (ref 40–59)
HGB BLD-MCNC: 11.9 G/DL
IMM GRANULOCYTES # BLD AUTO: 0.02 X10(3) UL (ref 0–1)
IMM GRANULOCYTES NFR BLD: 0.4 %
KETONES UR STRIP.AUTO-MCNC: NEGATIVE MG/DL
LDLC SERPL CALC-MCNC: 50 MG/DL (ref ?–100)
LEUKOCYTE ESTERASE UR QL STRIP.AUTO: NEGATIVE
LYMPHOCYTES # BLD AUTO: 1.09 X10(3) UL (ref 1–4)
LYMPHOCYTES NFR BLD AUTO: 22.1 %
MCH RBC QN AUTO: 22.1 PG (ref 26–34)
MCHC RBC AUTO-ENTMCNC: 28.8 G/DL (ref 31–37)
MCV RBC AUTO: 76.8 FL
MICROALBUMIN UR-MCNC: 1.06 MG/DL
MICROALBUMIN/CREAT 24H UR-RTO: 5.7 UG/MG (ref ?–30)
MONOCYTES # BLD AUTO: 0.41 X10(3) UL (ref 0.1–1)
MONOCYTES NFR BLD AUTO: 8.3 %
NEUTROPHILS # BLD AUTO: 3.28 X10 (3) UL (ref 1.5–7.7)
NEUTROPHILS # BLD AUTO: 3.28 X10(3) UL (ref 1.5–7.7)
NEUTROPHILS NFR BLD AUTO: 66.4 %
NITRITE UR QL STRIP.AUTO: NEGATIVE
NONHDLC SERPL-MCNC: 71 MG/DL (ref ?–130)
OSMOLALITY SERPL CALC.SUM OF ELEC: 294 MOSM/KG (ref 275–295)
PH UR STRIP.AUTO: 6 [PH] (ref 5–8)
PLATELET # BLD AUTO: 231 10(3)UL (ref 150–450)
POTASSIUM SERPL-SCNC: 4 MMOL/L (ref 3.5–5.1)
PROT SERPL-MCNC: 7.2 G/DL (ref 6.4–8.2)
PROT UR STRIP.AUTO-MCNC: NEGATIVE MG/DL
RBC # BLD AUTO: 5.38 X10(6)UL
RBC UR QL AUTO: NEGATIVE
SODIUM SERPL-SCNC: 140 MMOL/L (ref 136–145)
SP GR UR STRIP.AUTO: 1.02 (ref 1–1.03)
TRIGL SERPL-MCNC: 116 MG/DL (ref 30–149)
TSI SER-ACNC: 3.31 MIU/ML (ref 0.36–3.74)
UROBILINOGEN UR STRIP.AUTO-MCNC: 4 MG/DL
VLDLC SERPL CALC-MCNC: 17 MG/DL (ref 0–30)
WBC # BLD AUTO: 4.9 X10(3) UL (ref 4–11)

## 2022-10-29 PROCEDURE — 85025 COMPLETE CBC W/AUTO DIFF WBC: CPT

## 2022-10-29 PROCEDURE — 36415 COLL VENOUS BLD VENIPUNCTURE: CPT

## 2022-10-29 PROCEDURE — 82043 UR ALBUMIN QUANTITATIVE: CPT

## 2022-10-29 PROCEDURE — 82570 ASSAY OF URINE CREATININE: CPT

## 2022-10-29 PROCEDURE — 81001 URINALYSIS AUTO W/SCOPE: CPT

## 2022-10-29 PROCEDURE — 80053 COMPREHEN METABOLIC PANEL: CPT

## 2022-10-29 PROCEDURE — 83036 HEMOGLOBIN GLYCOSYLATED A1C: CPT

## 2022-10-29 PROCEDURE — 80061 LIPID PANEL: CPT

## 2022-10-29 PROCEDURE — 84443 ASSAY THYROID STIM HORMONE: CPT

## 2022-11-07 ENCOUNTER — OFFICE VISIT (OUTPATIENT)
Dept: INTERNAL MEDICINE CLINIC | Facility: CLINIC | Age: 77
End: 2022-11-07
Payer: MEDICARE

## 2022-11-07 VITALS
TEMPERATURE: 98 F | BODY MASS INDEX: 29.2 KG/M2 | HEART RATE: 59 BPM | DIASTOLIC BLOOD PRESSURE: 80 MMHG | HEIGHT: 70 IN | RESPIRATION RATE: 16 BRPM | SYSTOLIC BLOOD PRESSURE: 122 MMHG | OXYGEN SATURATION: 98 % | WEIGHT: 204 LBS

## 2022-11-07 DIAGNOSIS — Z00.00 ANNUAL PHYSICAL EXAM: Primary | ICD-10-CM

## 2022-11-07 DIAGNOSIS — Z00.00 ENCOUNTER FOR ANNUAL HEALTH EXAMINATION: ICD-10-CM

## 2022-11-07 DIAGNOSIS — I15.2 HYPERTENSION ASSOCIATED WITH DIABETES (HCC): ICD-10-CM

## 2022-11-07 DIAGNOSIS — E78.5 HYPERLIPIDEMIA ASSOCIATED WITH TYPE 2 DIABETES MELLITUS (HCC): ICD-10-CM

## 2022-11-07 DIAGNOSIS — E11.59 HYPERTENSION ASSOCIATED WITH DIABETES (HCC): ICD-10-CM

## 2022-11-07 DIAGNOSIS — I25.10 CORONARY ARTERY DISEASE INVOLVING NATIVE CORONARY ARTERY OF NATIVE HEART WITHOUT ANGINA PECTORIS: ICD-10-CM

## 2022-11-07 DIAGNOSIS — E11.69 HYPERLIPIDEMIA ASSOCIATED WITH TYPE 2 DIABETES MELLITUS (HCC): ICD-10-CM

## 2022-11-07 DIAGNOSIS — E11.65 TYPE 2 DIABETES MELLITUS WITH HYPERGLYCEMIA, WITHOUT LONG-TERM CURRENT USE OF INSULIN (HCC): ICD-10-CM

## 2022-11-07 DIAGNOSIS — Z95.1 S/P CABG X 4: ICD-10-CM

## 2022-11-07 DIAGNOSIS — E03.8 HYPOTHYROIDISM DUE TO HASHIMOTO'S THYROIDITIS: ICD-10-CM

## 2022-11-07 DIAGNOSIS — E06.3 HYPOTHYROIDISM DUE TO HASHIMOTO'S THYROIDITIS: ICD-10-CM

## 2022-11-07 PROBLEM — I10 ESSENTIAL HYPERTENSION: Status: RESOLVED | Noted: 2017-07-11 | Resolved: 2022-11-07

## 2022-12-23 DIAGNOSIS — E78.2 MIXED HYPERLIPIDEMIA: ICD-10-CM

## 2022-12-23 DIAGNOSIS — Z95.1 S/P CABG X 4: ICD-10-CM

## 2022-12-23 DIAGNOSIS — I25.10 CORONARY ARTERY DISEASE INVOLVING NATIVE CORONARY ARTERY OF NATIVE HEART WITHOUT ANGINA PECTORIS: ICD-10-CM

## 2022-12-23 DIAGNOSIS — E11.9 TYPE 2 DIABETES MELLITUS WITHOUT COMPLICATION, WITHOUT LONG-TERM CURRENT USE OF INSULIN (HCC): ICD-10-CM

## 2022-12-23 DIAGNOSIS — I10 ESSENTIAL HYPERTENSION: ICD-10-CM

## 2022-12-23 DIAGNOSIS — E06.3 HYPOTHYROIDISM DUE TO HASHIMOTO'S THYROIDITIS: ICD-10-CM

## 2022-12-23 DIAGNOSIS — E03.8 HYPOTHYROIDISM DUE TO HASHIMOTO'S THYROIDITIS: ICD-10-CM

## 2022-12-23 RX ORDER — LEVOTHYROXINE SODIUM 0.12 MG/1
125 TABLET ORAL
Qty: 90 TABLET | Refills: 0 | Status: SHIPPED | OUTPATIENT
Start: 2022-12-23

## 2022-12-23 RX ORDER — CLOPIDOGREL BISULFATE 75 MG/1
75 TABLET ORAL DAILY
Qty: 90 TABLET | Refills: 0 | Status: SHIPPED | OUTPATIENT
Start: 2022-12-23

## 2022-12-23 RX ORDER — ATORVASTATIN CALCIUM 40 MG/1
40 TABLET, FILM COATED ORAL DAILY
Qty: 90 TABLET | Refills: 0 | Status: SHIPPED | OUTPATIENT
Start: 2022-12-23

## 2022-12-23 RX ORDER — METOPROLOL SUCCINATE 50 MG/1
50 TABLET, EXTENDED RELEASE ORAL 2 TIMES DAILY
Qty: 180 TABLET | Refills: 0 | Status: SHIPPED | OUTPATIENT
Start: 2022-12-23

## 2022-12-23 NOTE — TELEPHONE ENCOUNTER
Last time medication was refilled 9/22/22  Quantity and # of refills 90/0  Last OV 11/7/22  Next OV none scheduled

## 2023-01-13 DIAGNOSIS — E03.8 HYPOTHYROIDISM DUE TO HASHIMOTO'S THYROIDITIS: ICD-10-CM

## 2023-01-13 DIAGNOSIS — I25.10 CORONARY ARTERY DISEASE INVOLVING NATIVE CORONARY ARTERY OF NATIVE HEART WITHOUT ANGINA PECTORIS: ICD-10-CM

## 2023-01-13 DIAGNOSIS — E06.3 HYPOTHYROIDISM DUE TO HASHIMOTO'S THYROIDITIS: ICD-10-CM

## 2023-01-13 DIAGNOSIS — E11.9 TYPE 2 DIABETES MELLITUS WITHOUT COMPLICATION, WITHOUT LONG-TERM CURRENT USE OF INSULIN (HCC): ICD-10-CM

## 2023-01-13 DIAGNOSIS — E78.2 MIXED HYPERLIPIDEMIA: ICD-10-CM

## 2023-01-13 DIAGNOSIS — Z95.1 S/P CABG X 4: ICD-10-CM

## 2023-01-13 DIAGNOSIS — I10 ESSENTIAL HYPERTENSION: ICD-10-CM

## 2023-01-13 RX ORDER — LEVOTHYROXINE SODIUM 0.12 MG/1
125 TABLET ORAL
Qty: 90 TABLET | Refills: 0 | OUTPATIENT
Start: 2023-01-13

## 2023-01-13 RX ORDER — ATORVASTATIN CALCIUM 40 MG/1
40 TABLET, FILM COATED ORAL DAILY
Qty: 90 TABLET | Refills: 0 | Status: SHIPPED | OUTPATIENT
Start: 2023-01-13

## 2023-01-13 RX ORDER — LEVOTHYROXINE SODIUM 0.12 MG/1
125 TABLET ORAL
Qty: 90 TABLET | Refills: 0 | Status: SHIPPED | OUTPATIENT
Start: 2023-01-13

## 2023-01-13 RX ORDER — CLOPIDOGREL BISULFATE 75 MG/1
75 TABLET ORAL DAILY
Qty: 90 TABLET | Refills: 0 | OUTPATIENT
Start: 2023-01-13

## 2023-01-13 RX ORDER — CLOPIDOGREL BISULFATE 75 MG/1
75 TABLET ORAL DAILY
Qty: 90 TABLET | Refills: 0 | Status: SHIPPED | OUTPATIENT
Start: 2023-01-13

## 2023-01-13 RX ORDER — METOPROLOL SUCCINATE 50 MG/1
50 TABLET, EXTENDED RELEASE ORAL 2 TIMES DAILY
Qty: 180 TABLET | Refills: 0 | Status: SHIPPED | OUTPATIENT
Start: 2023-01-13

## 2023-01-13 RX ORDER — ATORVASTATIN CALCIUM 40 MG/1
40 TABLET, FILM COATED ORAL DAILY
Qty: 90 TABLET | Refills: 0 | OUTPATIENT
Start: 2023-01-13

## 2023-01-13 RX ORDER — METOPROLOL SUCCINATE 50 MG/1
50 TABLET, EXTENDED RELEASE ORAL 2 TIMES DAILY
Qty: 180 TABLET | Refills: 0 | OUTPATIENT
Start: 2023-01-13

## 2023-01-13 NOTE — TELEPHONE ENCOUNTER
Patient is requesting the next refills on the following medications be sent to:    [facility]/PH[facility]ACY #[id number]- [place], [place] - [address] AT [facility], [phone], [phone]    Clopidogrel 75 mg  Levothyroxine 125 mcg  Metformin 805 mg  Metoprolol succinate er 50 mg  Atorvastatin 40 mg

## 2023-03-24 DIAGNOSIS — E06.3 HYPOTHYROIDISM DUE TO HASHIMOTO'S THYROIDITIS: ICD-10-CM

## 2023-03-24 DIAGNOSIS — E03.8 HYPOTHYROIDISM DUE TO HASHIMOTO'S THYROIDITIS: ICD-10-CM

## 2023-03-24 RX ORDER — LEVOTHYROXINE SODIUM 0.12 MG/1
125 TABLET ORAL
Qty: 90 TABLET | Refills: 3 | Status: SHIPPED | OUTPATIENT
Start: 2023-03-24

## 2023-06-24 DIAGNOSIS — E78.2 MIXED HYPERLIPIDEMIA: ICD-10-CM

## 2023-06-24 DIAGNOSIS — I10 ESSENTIAL HYPERTENSION: ICD-10-CM

## 2023-06-24 DIAGNOSIS — Z95.1 S/P CABG X 4: ICD-10-CM

## 2023-06-24 DIAGNOSIS — I25.10 CORONARY ARTERY DISEASE INVOLVING NATIVE CORONARY ARTERY OF NATIVE HEART WITHOUT ANGINA PECTORIS: ICD-10-CM

## 2023-06-24 DIAGNOSIS — E11.9 TYPE 2 DIABETES MELLITUS WITHOUT COMPLICATION, WITHOUT LONG-TERM CURRENT USE OF INSULIN (HCC): ICD-10-CM

## 2023-06-25 RX ORDER — METOPROLOL SUCCINATE 50 MG/1
50 TABLET, EXTENDED RELEASE ORAL 2 TIMES DAILY
Qty: 180 TABLET | Refills: 0 | Status: SHIPPED | OUTPATIENT
Start: 2023-06-25

## 2023-06-25 RX ORDER — ATORVASTATIN CALCIUM 40 MG/1
TABLET, FILM COATED ORAL
Qty: 90 TABLET | Refills: 0 | Status: SHIPPED | OUTPATIENT
Start: 2023-06-25

## 2023-06-26 RX ORDER — CLOPIDOGREL BISULFATE 75 MG/1
TABLET ORAL
Qty: 90 TABLET | Refills: 0 | Status: SHIPPED | OUTPATIENT
Start: 2023-06-26

## 2023-08-16 ENCOUNTER — OFFICE VISIT (OUTPATIENT)
Dept: INTERNAL MEDICINE CLINIC | Facility: CLINIC | Age: 78
End: 2023-08-16
Payer: MEDICARE

## 2023-08-16 VITALS
HEIGHT: 70 IN | BODY MASS INDEX: 29.49 KG/M2 | OXYGEN SATURATION: 97 % | WEIGHT: 206 LBS | DIASTOLIC BLOOD PRESSURE: 60 MMHG | RESPIRATION RATE: 16 BRPM | TEMPERATURE: 99 F | HEART RATE: 67 BPM | SYSTOLIC BLOOD PRESSURE: 118 MMHG

## 2023-08-16 DIAGNOSIS — E11.59 HYPERTENSION ASSOCIATED WITH DIABETES: ICD-10-CM

## 2023-08-16 DIAGNOSIS — I15.2 HYPERTENSION ASSOCIATED WITH DIABETES: ICD-10-CM

## 2023-08-16 DIAGNOSIS — J98.01 BRONCHOSPASM: ICD-10-CM

## 2023-08-16 DIAGNOSIS — E11.65 TYPE 2 DIABETES MELLITUS WITH HYPERGLYCEMIA, WITHOUT LONG-TERM CURRENT USE OF INSULIN (HCC): ICD-10-CM

## 2023-08-16 DIAGNOSIS — R05.9 COUGH IN ADULT: Primary | ICD-10-CM

## 2023-08-16 PROCEDURE — 99214 OFFICE O/P EST MOD 30 MIN: CPT

## 2023-08-16 RX ORDER — BENZONATATE 200 MG/1
200 CAPSULE ORAL 3 TIMES DAILY PRN
Qty: 20 CAPSULE | Refills: 0 | Status: SHIPPED | OUTPATIENT
Start: 2023-08-16

## 2023-08-16 RX ORDER — PREDNISONE 20 MG/1
40 TABLET ORAL DAILY
Qty: 14 TABLET | Refills: 0 | Status: SHIPPED | OUTPATIENT
Start: 2023-08-16 | End: 2023-08-23

## 2023-08-16 RX ORDER — ALBUTEROL SULFATE 90 UG/1
1 AEROSOL, METERED RESPIRATORY (INHALATION) EVERY 6 HOURS PRN
Qty: 1 EACH | Refills: 0 | Status: SHIPPED | OUTPATIENT
Start: 2023-08-16

## 2023-08-18 ENCOUNTER — HOSPITAL ENCOUNTER (OUTPATIENT)
Dept: GENERAL RADIOLOGY | Age: 78
Discharge: HOME | End: 2023-08-18
Payer: MEDICARE

## 2023-08-18 ENCOUNTER — TELEPHONE (OUTPATIENT)
Dept: INTERNAL MEDICINE CLINIC | Facility: CLINIC | Age: 78
End: 2023-08-18

## 2023-08-18 DIAGNOSIS — R05.1 ACUTE COUGH: ICD-10-CM

## 2023-08-18 DIAGNOSIS — R05.1 ACUTE COUGH: Primary | ICD-10-CM

## 2023-08-18 PROCEDURE — 71046 X-RAY EXAM CHEST 2 VIEWS: CPT

## 2023-08-18 RX ORDER — AZITHROMYCIN 250 MG/1
TABLET, FILM COATED ORAL
Qty: 6 TABLET | Refills: 0 | Status: SHIPPED | OUTPATIENT
Start: 2023-08-18 | End: 2023-08-22

## 2023-08-18 NOTE — TELEPHONE ENCOUNTER
Patient was prescribed medication for a severe cough and the cough has not improved and seems to be more frequent. The patient has been taking the medication for three days. Flori Wilson mentioned if medication does not work she would prescribe a different medication.     Patient would like a call back at [phone]

## 2023-08-18 NOTE — TELEPHONE ENCOUNTER
Spoke with pt his cough has become more frequent but once he is able to clear things he feels relief. It is not productive. Per [place] [facility] chest xray and zpack, continue medications described at office visit. D/w pt above recommendations he expressed understanding.  Rx sent and pt transferred to [facility]

## 2023-08-24 ENCOUNTER — OFFICE VISIT (OUTPATIENT)
Dept: INTERNAL MEDICINE CLINIC | Facility: CLINIC | Age: 78
End: 2023-08-24
Payer: MEDICARE

## 2023-08-24 ENCOUNTER — TELEPHONE (OUTPATIENT)
Dept: INTERNAL MEDICINE CLINIC | Facility: CLINIC | Age: 78
End: 2023-08-24

## 2023-08-24 VITALS
HEIGHT: 70 IN | BODY MASS INDEX: 29.2 KG/M2 | WEIGHT: 204 LBS | TEMPERATURE: 99 F | RESPIRATION RATE: 16 BRPM | SYSTOLIC BLOOD PRESSURE: 130 MMHG | HEART RATE: 80 BPM | DIASTOLIC BLOOD PRESSURE: 80 MMHG | OXYGEN SATURATION: 98 %

## 2023-08-24 DIAGNOSIS — J40 BRONCHITIS: ICD-10-CM

## 2023-08-24 DIAGNOSIS — R05.2 SUBACUTE COUGH: Primary | ICD-10-CM

## 2023-08-24 PROCEDURE — 99213 OFFICE O/P EST LOW 20 MIN: CPT

## 2023-08-24 RX ORDER — BUDESONIDE, GLYCOPYRROLATE, AND FORMOTEROL FUMARATE 160; 9; 4.8 UG/1; UG/1; UG/1
2 AEROSOL, METERED RESPIRATORY (INHALATION) 2 TIMES DAILY
Qty: 1 EACH | Refills: 0 | COMMUNITY
Start: 2023-08-24 | End: 2023-09-03

## 2023-08-24 RX ORDER — CODEINE PHOSPHATE AND GUAIFENESIN 10; 100 MG/5ML; MG/5ML
5 SOLUTION ORAL NIGHTLY PRN
Qty: 120 ML | Refills: 0 | Status: SHIPPED | OUTPATIENT
Start: 2023-08-24

## 2023-08-24 NOTE — TELEPHONE ENCOUNTER
Spoke with pt, still has dry cough and keeps him up, sometimes hard to catch a breath  Per [place], pt to come in for further eval as pt has failed other treatment options  Appt given for today

## 2023-08-24 NOTE — TELEPHONE ENCOUNTER
Pt is calling to follow up regarding cough that he has had for over a week. OV with Jessika on 8/16 and he still has a dry cough that is keeping him up.     Would like to know what options he has so he can rest and feel better     Covid test was neg    Please call back to discuss

## 2023-09-23 DIAGNOSIS — E06.3 HYPOTHYROIDISM DUE TO HASHIMOTO'S THYROIDITIS: ICD-10-CM

## 2023-09-23 DIAGNOSIS — Z95.1 S/P CABG X 4: ICD-10-CM

## 2023-09-23 DIAGNOSIS — I10 ESSENTIAL HYPERTENSION: ICD-10-CM

## 2023-09-23 DIAGNOSIS — I25.10 CORONARY ARTERY DISEASE INVOLVING NATIVE CORONARY ARTERY OF NATIVE HEART WITHOUT ANGINA PECTORIS: ICD-10-CM

## 2023-09-23 DIAGNOSIS — E11.9 TYPE 2 DIABETES MELLITUS WITHOUT COMPLICATION, WITHOUT LONG-TERM CURRENT USE OF INSULIN (HCC): ICD-10-CM

## 2023-09-23 DIAGNOSIS — E78.2 MIXED HYPERLIPIDEMIA: ICD-10-CM

## 2023-09-23 DIAGNOSIS — E03.8 HYPOTHYROIDISM DUE TO HASHIMOTO'S THYROIDITIS: ICD-10-CM

## 2023-09-23 RX ORDER — ATORVASTATIN CALCIUM 40 MG/1
40 TABLET, FILM COATED ORAL DAILY
Qty: 90 TABLET | Refills: 0 | Status: SHIPPED | OUTPATIENT
Start: 2023-09-23

## 2023-09-23 RX ORDER — LEVOTHYROXINE SODIUM 0.12 MG/1
125 TABLET ORAL
Qty: 90 TABLET | Refills: 0 | OUTPATIENT
Start: 2023-09-23

## 2023-09-23 NOTE — TELEPHONE ENCOUNTER
Last time medication was refilled 03/24/2023  Quantity and # of refills 90 w/ 3   Last OV 08/24/2023  Next OV No future appointments. Refill too soon, medication declined.

## 2023-09-25 RX ORDER — METOPROLOL SUCCINATE 50 MG/1
50 TABLET, EXTENDED RELEASE ORAL 2 TIMES DAILY
Qty: 180 TABLET | Refills: 0 | Status: SHIPPED | OUTPATIENT
Start: 2023-09-25

## 2023-09-25 RX ORDER — CLOPIDOGREL BISULFATE 75 MG/1
75 TABLET ORAL DAILY
Qty: 90 TABLET | Refills: 0 | OUTPATIENT
Start: 2023-09-25

## 2023-09-25 RX ORDER — CLOPIDOGREL BISULFATE 75 MG/1
75 TABLET ORAL DAILY
Qty: 90 TABLET | Refills: 0 | Status: SHIPPED | OUTPATIENT
Start: 2023-09-25

## 2023-09-25 NOTE — TELEPHONE ENCOUNTER
Last time medication was refilled 06/25/2023  Quantity and # of refills 180 w/ 0  Last OV 08/24/2023  Next OV No future appointments. Refill Sent.

## 2023-09-25 NOTE — TELEPHONE ENCOUNTER
Last time medication was refilled 06/26/2023  Quantity and # of refills 180 w 0  Last OV 08/24/2023  Next OV No appointment scheduled      Protocol failed     Sent to Dr. Heydi Jeronimo for approval

## 2023-09-25 NOTE — TELEPHONE ENCOUNTER
Last time medication was refilled 06/26/2023  Quantity and # of refills 90 w 0  Last OV 08/24/2023  Next OV No appointment scheduled

## 2023-09-28 ENCOUNTER — OFFICE VISIT (OUTPATIENT)
Dept: INTERNAL MEDICINE CLINIC | Facility: CLINIC | Age: 78
End: 2023-09-28
Payer: MEDICARE

## 2023-09-28 VITALS
SYSTOLIC BLOOD PRESSURE: 130 MMHG | HEIGHT: 70 IN | DIASTOLIC BLOOD PRESSURE: 78 MMHG | RESPIRATION RATE: 16 BRPM | TEMPERATURE: 97 F | BODY MASS INDEX: 28.92 KG/M2 | HEART RATE: 74 BPM | WEIGHT: 202 LBS | OXYGEN SATURATION: 97 %

## 2023-09-28 DIAGNOSIS — E78.5 HYPERLIPIDEMIA ASSOCIATED WITH TYPE 2 DIABETES MELLITUS: ICD-10-CM

## 2023-09-28 DIAGNOSIS — E06.3 HYPOTHYROIDISM DUE TO HASHIMOTO'S THYROIDITIS: ICD-10-CM

## 2023-09-28 DIAGNOSIS — Z12.5 SCREENING FOR PROSTATE CANCER: ICD-10-CM

## 2023-09-28 DIAGNOSIS — E11.59 HYPERTENSION ASSOCIATED WITH DIABETES: Primary | ICD-10-CM

## 2023-09-28 DIAGNOSIS — E03.8 HYPOTHYROIDISM DUE TO HASHIMOTO'S THYROIDITIS: ICD-10-CM

## 2023-09-28 DIAGNOSIS — I15.2 HYPERTENSION ASSOCIATED WITH DIABETES: Primary | ICD-10-CM

## 2023-09-28 DIAGNOSIS — E11.65 TYPE 2 DIABETES MELLITUS WITH HYPERGLYCEMIA, WITHOUT LONG-TERM CURRENT USE OF INSULIN (HCC): ICD-10-CM

## 2023-09-28 DIAGNOSIS — E11.69 HYPERLIPIDEMIA ASSOCIATED WITH TYPE 2 DIABETES MELLITUS: ICD-10-CM

## 2023-09-28 PROCEDURE — 99214 OFFICE O/P EST MOD 30 MIN: CPT

## 2023-11-07 ENCOUNTER — LAB ENCOUNTER (OUTPATIENT)
Dept: LAB | Age: 78
End: 2023-11-07
Attending: NURSE PRACTITIONER
Payer: MEDICARE

## 2023-11-07 DIAGNOSIS — Z13.0 SCREENING FOR IRON DEFICIENCY ANEMIA: ICD-10-CM

## 2023-11-07 DIAGNOSIS — Z12.5 SCREENING FOR PROSTATE CANCER: ICD-10-CM

## 2023-11-07 DIAGNOSIS — E11.59 HYPERTENSION ASSOCIATED WITH DIABETES: ICD-10-CM

## 2023-11-07 DIAGNOSIS — E11.65 TYPE 2 DIABETES MELLITUS WITH HYPERGLYCEMIA, WITHOUT LONG-TERM CURRENT USE OF INSULIN (HCC): ICD-10-CM

## 2023-11-07 DIAGNOSIS — I15.2 HYPERTENSION ASSOCIATED WITH DIABETES: ICD-10-CM

## 2023-11-07 PROBLEM — D50.9 MICROCYTIC ANEMIA: Status: ACTIVE | Noted: 2023-11-07

## 2023-11-07 LAB
ALBUMIN SERPL-MCNC: 3.6 G/DL (ref 3.4–5)
ALBUMIN/GLOB SERPL: 1 {RATIO} (ref 1–2)
ALP LIVER SERPL-CCNC: 98 U/L
ALT SERPL-CCNC: 50 U/L
ANION GAP SERPL CALC-SCNC: 7 MMOL/L (ref 0–18)
AST SERPL-CCNC: 28 U/L (ref 15–37)
BASOPHILS # BLD AUTO: 0.05 X10(3) UL (ref 0–0.2)
BASOPHILS NFR BLD AUTO: 0.9 %
BILIRUB SERPL-MCNC: 0.9 MG/DL (ref 0.1–2)
BILIRUB UR QL STRIP.AUTO: NEGATIVE
BUN BLD-MCNC: 18 MG/DL (ref 9–23)
CALCIUM BLD-MCNC: 9.4 MG/DL (ref 8.5–10.1)
CHLORIDE SERPL-SCNC: 106 MMOL/L (ref 98–112)
CHOLEST SERPL-MCNC: 121 MG/DL (ref ?–200)
CLARITY UR REFRACT.AUTO: CLEAR
CO2 SERPL-SCNC: 25 MMOL/L (ref 21–32)
COLOR UR AUTO: YELLOW
COMPLEXED PSA SERPL-MCNC: 0.41 NG/ML (ref ?–4)
CREAT BLD-MCNC: 1.26 MG/DL
CREAT UR-SCNC: 190 MG/DL
EGFRCR SERPLBLD CKD-EPI 2021: 59 ML/MIN/1.73M2 (ref 60–?)
EOSINOPHIL # BLD AUTO: 0.16 X10(3) UL (ref 0–0.7)
EOSINOPHIL NFR BLD AUTO: 2.8 %
ERYTHROCYTE [DISTWIDTH] IN BLOOD BY AUTOMATED COUNT: 19.2 %
EST. AVERAGE GLUCOSE BLD GHB EST-MCNC: 171 MG/DL (ref 68–126)
FASTING PATIENT LIPID ANSWER: YES
FASTING STATUS PATIENT QL REPORTED: YES
GLOBULIN PLAS-MCNC: 3.7 G/DL (ref 2.8–4.4)
GLUCOSE BLD-MCNC: 126 MG/DL (ref 70–99)
GLUCOSE UR STRIP.AUTO-MCNC: NORMAL MG/DL
HBA1C MFR BLD: 7.6 % (ref ?–5.7)
HCT VFR BLD AUTO: 42.7 %
HDLC SERPL-MCNC: 32 MG/DL (ref 40–59)
HGB BLD-MCNC: 12.3 G/DL
IMM GRANULOCYTES # BLD AUTO: 0.03 X10(3) UL (ref 0–1)
IMM GRANULOCYTES NFR BLD: 0.5 %
KETONES UR STRIP.AUTO-MCNC: NEGATIVE MG/DL
LDLC SERPL CALC-MCNC: 64 MG/DL (ref ?–100)
LEUKOCYTE ESTERASE UR QL STRIP.AUTO: NEGATIVE
LYMPHOCYTES # BLD AUTO: 1.29 X10(3) UL (ref 1–4)
LYMPHOCYTES NFR BLD AUTO: 22.8 %
MCH RBC QN AUTO: 22 PG (ref 26–34)
MCHC RBC AUTO-ENTMCNC: 28.8 G/DL (ref 31–37)
MCV RBC AUTO: 76.5 FL
MICROALBUMIN UR-MCNC: 1.34 MG/DL
MICROALBUMIN/CREAT 24H UR-RTO: 7.1 UG/MG (ref ?–30)
MONOCYTES # BLD AUTO: 0.49 X10(3) UL (ref 0.1–1)
MONOCYTES NFR BLD AUTO: 8.7 %
NEUTROPHILS # BLD AUTO: 3.64 X10 (3) UL (ref 1.5–7.7)
NEUTROPHILS # BLD AUTO: 3.64 X10(3) UL (ref 1.5–7.7)
NEUTROPHILS NFR BLD AUTO: 64.3 %
NITRITE UR QL STRIP.AUTO: NEGATIVE
NONHDLC SERPL-MCNC: 89 MG/DL (ref ?–130)
OSMOLALITY SERPL CALC.SUM OF ELEC: 289 MOSM/KG (ref 275–295)
PH UR STRIP.AUTO: 6 [PH] (ref 5–8)
PLATELET # BLD AUTO: 355 10(3)UL (ref 150–450)
POTASSIUM SERPL-SCNC: 4.3 MMOL/L (ref 3.5–5.1)
PROT SERPL-MCNC: 7.3 G/DL (ref 6.4–8.2)
PROT UR STRIP.AUTO-MCNC: NEGATIVE MG/DL
RBC # BLD AUTO: 5.58 X10(6)UL
RBC UR QL AUTO: NEGATIVE
SODIUM SERPL-SCNC: 138 MMOL/L (ref 136–145)
SP GR UR STRIP.AUTO: 1.02 (ref 1–1.03)
TRIGL SERPL-MCNC: 144 MG/DL (ref 30–149)
TSI SER-ACNC: 1.91 MIU/ML (ref 0.36–3.74)
UROBILINOGEN UR STRIP.AUTO-MCNC: NORMAL MG/DL
VLDLC SERPL CALC-MCNC: 22 MG/DL (ref 0–30)
WBC # BLD AUTO: 5.7 X10(3) UL (ref 4–11)

## 2023-11-07 PROCEDURE — 36415 COLL VENOUS BLD VENIPUNCTURE: CPT

## 2023-11-07 PROCEDURE — 82043 UR ALBUMIN QUANTITATIVE: CPT

## 2023-11-07 PROCEDURE — 85025 COMPLETE CBC W/AUTO DIFF WBC: CPT

## 2023-11-07 PROCEDURE — 83036 HEMOGLOBIN GLYCOSYLATED A1C: CPT

## 2023-11-07 PROCEDURE — 81003 URINALYSIS AUTO W/O SCOPE: CPT

## 2023-11-07 PROCEDURE — 83550 IRON BINDING TEST: CPT

## 2023-11-07 PROCEDURE — 82570 ASSAY OF URINE CREATININE: CPT

## 2023-11-07 PROCEDURE — 80061 LIPID PANEL: CPT

## 2023-11-07 PROCEDURE — 83540 ASSAY OF IRON: CPT

## 2023-11-07 PROCEDURE — 82728 ASSAY OF FERRITIN: CPT

## 2023-11-07 PROCEDURE — 80053 COMPREHEN METABOLIC PANEL: CPT

## 2023-11-07 PROCEDURE — 84443 ASSAY THYROID STIM HORMONE: CPT

## 2023-11-08 DIAGNOSIS — Z13.0 SCREENING FOR IRON DEFICIENCY ANEMIA: Primary | ICD-10-CM

## 2023-11-08 LAB
DEPRECATED HBV CORE AB SER IA-ACNC: 13.3 NG/ML
IRON SATN MFR SERPL: 9 %
IRON SERPL-MCNC: 34 UG/DL
TIBC SERPL-MCNC: 396 UG/DL (ref 240–450)
TRANSFERRIN SERPL-MCNC: 266 MG/DL (ref 200–360)

## 2023-11-09 ENCOUNTER — OFFICE VISIT (OUTPATIENT)
Dept: INTERNAL MEDICINE CLINIC | Facility: CLINIC | Age: 78
End: 2023-11-09
Payer: MEDICARE

## 2023-11-09 VITALS
BODY MASS INDEX: 28.06 KG/M2 | OXYGEN SATURATION: 98 % | SYSTOLIC BLOOD PRESSURE: 126 MMHG | DIASTOLIC BLOOD PRESSURE: 70 MMHG | HEIGHT: 70 IN | HEART RATE: 80 BPM | TEMPERATURE: 98 F | RESPIRATION RATE: 16 BRPM | WEIGHT: 196 LBS

## 2023-11-09 DIAGNOSIS — Z00.00 ANNUAL PHYSICAL EXAM: Primary | ICD-10-CM

## 2023-11-09 DIAGNOSIS — Z95.1 S/P CABG X 4: ICD-10-CM

## 2023-11-09 DIAGNOSIS — E03.8 HYPOTHYROIDISM DUE TO HASHIMOTO'S THYROIDITIS: ICD-10-CM

## 2023-11-09 DIAGNOSIS — E06.3 HYPOTHYROIDISM DUE TO HASHIMOTO'S THYROIDITIS: ICD-10-CM

## 2023-11-09 DIAGNOSIS — E11.59 HYPERTENSION ASSOCIATED WITH DIABETES: ICD-10-CM

## 2023-11-09 DIAGNOSIS — E11.65 TYPE 2 DIABETES MELLITUS WITH HYPERGLYCEMIA, WITHOUT LONG-TERM CURRENT USE OF INSULIN (HCC): ICD-10-CM

## 2023-11-09 DIAGNOSIS — Z12.11 COLON CANCER SCREENING: ICD-10-CM

## 2023-11-09 DIAGNOSIS — Z00.00 ENCOUNTER FOR ANNUAL HEALTH EXAMINATION: ICD-10-CM

## 2023-11-09 DIAGNOSIS — E11.69 HYPERLIPIDEMIA ASSOCIATED WITH TYPE 2 DIABETES MELLITUS: ICD-10-CM

## 2023-11-09 DIAGNOSIS — D50.9 MICROCYTIC ANEMIA: ICD-10-CM

## 2023-11-09 DIAGNOSIS — D50.9 IRON DEFICIENCY ANEMIA, UNSPECIFIED IRON DEFICIENCY ANEMIA TYPE: ICD-10-CM

## 2023-11-09 DIAGNOSIS — E78.5 HYPERLIPIDEMIA ASSOCIATED WITH TYPE 2 DIABETES MELLITUS: ICD-10-CM

## 2023-11-09 DIAGNOSIS — I15.2 HYPERTENSION ASSOCIATED WITH DIABETES: ICD-10-CM

## 2023-11-09 DIAGNOSIS — I25.10 CORONARY ARTERY DISEASE INVOLVING NATIVE CORONARY ARTERY OF NATIVE HEART WITHOUT ANGINA PECTORIS: ICD-10-CM

## 2023-11-09 PROCEDURE — G0439 PPPS, SUBSEQ VISIT: HCPCS | Performed by: INTERNAL MEDICINE

## 2023-11-09 PROCEDURE — 1126F AMNT PAIN NOTED NONE PRSNT: CPT | Performed by: INTERNAL MEDICINE

## 2023-11-09 RX ORDER — CLOPIDOGREL BISULFATE 75 MG/1
75 TABLET ORAL DAILY
Qty: 90 TABLET | Refills: 1 | Status: SHIPPED | OUTPATIENT
Start: 2023-11-09

## 2023-11-09 RX ORDER — METOPROLOL SUCCINATE 50 MG/1
50 TABLET, EXTENDED RELEASE ORAL 2 TIMES DAILY
Qty: 180 TABLET | Refills: 1 | Status: SHIPPED | OUTPATIENT
Start: 2023-11-09

## 2023-11-09 RX ORDER — LEVOTHYROXINE SODIUM 0.12 MG/1
125 TABLET ORAL
Qty: 90 TABLET | Refills: 3 | Status: SHIPPED | OUTPATIENT
Start: 2023-11-09

## 2023-11-09 RX ORDER — ATORVASTATIN CALCIUM 40 MG/1
40 TABLET, FILM COATED ORAL DAILY
Qty: 90 TABLET | Refills: 3 | Status: SHIPPED | OUTPATIENT
Start: 2023-11-09

## 2023-12-12 ENCOUNTER — MED REC SCAN ONLY (OUTPATIENT)
Dept: INTERNAL MEDICINE CLINIC | Facility: CLINIC | Age: 78
End: 2023-12-12

## 2023-12-18 PROBLEM — K29.70 HELICOBACTER PYLORI GASTRITIS: Status: ACTIVE | Noted: 2023-12-18

## 2023-12-18 PROBLEM — Z86.0101 HISTORY OF ADENOMATOUS POLYP OF COLON: Status: ACTIVE | Noted: 2023-12-18

## 2023-12-18 PROBLEM — Z86.010 HISTORY OF ADENOMATOUS POLYP OF COLON: Status: ACTIVE | Noted: 2023-12-18

## 2023-12-18 PROBLEM — B96.81 HELICOBACTER PYLORI GASTRITIS: Status: ACTIVE | Noted: 2023-12-18

## 2023-12-18 PROBLEM — D50.9 IRON DEFICIENCY ANEMIA, UNSPECIFIED: Status: ACTIVE | Noted: 2023-12-18

## 2023-12-18 PROBLEM — K62.5 RECTAL BLEEDING: Status: ACTIVE | Noted: 2023-12-18

## 2024-01-10 ENCOUNTER — OFFICE VISIT (OUTPATIENT)
Dept: SURGERY | Facility: CLINIC | Age: 79
End: 2024-01-10
Payer: MEDICARE

## 2024-01-10 VITALS — HEART RATE: 88 BPM | TEMPERATURE: 98 F

## 2024-01-10 DIAGNOSIS — K62.5 RECTAL BLEEDING: ICD-10-CM

## 2024-01-10 DIAGNOSIS — K64.3 PROLAPSED INTERNAL HEMORRHOIDS, GRADE 4: Primary | ICD-10-CM

## 2024-01-10 PROCEDURE — 99214 OFFICE O/P EST MOD 30 MIN: CPT | Performed by: SURGERY

## 2024-01-10 PROCEDURE — 46600 DIAGNOSTIC ANOSCOPY SPX: CPT | Performed by: SURGERY

## 2024-01-10 RX ORDER — CEFAZOLIN SODIUM/WATER 2 G/20 ML
2 SYRINGE (ML) INTRAVENOUS ONCE
Status: CANCELLED | OUTPATIENT
Start: 2024-01-10 | End: 2024-01-10

## 2024-01-10 NOTE — H&P
New Patient Visit Note       Active Problems      1. Prolapsed internal hemorrhoids, grade 4    2. Rectal bleeding        Chief Complaint   Chief Complaint   Patient presents with    New Patient     NP - Large hemorrhoids, mild bleeding, some issues when having a BM if the patient does not eat enough fiber, no other symptoms.       History of Present Illness     The patient presents at the request of his primary care physician and gastroenterologist for evaluation of recurrent internal hemorrhoids and rectal bleeding.  The patient had recent colonoscopy for iron deficiency anemia.  Diverticulosis coli and large prominent recurrent internal hemorrhoids were identified.  I have previously treated patient by rubber band ligation and phenol injection in 2021.  The patient did well following those procedures but now has experienced iron deficiency anemia and hematochezia of variable volume and frequency.  I reviewed the colonoscopy report and the findings are discussed with the patient.    The patient denies fever, chills, chest pain, shortness of breath, dyspnea. The patient also denies hematemesis, melena. The patient denies change in bowel or bladder habits. There is no complaint of hematuria or dysuria.    I discussed the risk, benefits, alternatives of stapled PPH hemorrhoidectomy.  We discussed anticipated postoperative recovery including dietary, activity, exercise and lifestyle recommendations.    The patient states he is planning to travel to [place] for the winter shortly after our anticipated procedure.  I discussed recuperation strategies with the patient while he is away from home.    Allergies  Uli is allergic to aspirin and nitroglycerin.    Past Medical / Surgical / Social / Family History    The past medical and past surgical history have been reviewed by me today.    Past Medical History:   Diagnosis Date    Abdominal hernia 10/06/2000    had a mesh inplanted    Atherosclerosis of coronary artery  06/01/2002    CAD (coronary artery disease)     Calculus of kidney 09/01/2000    Diabetes (HCC)     Essential hypertension 06/01/2002    Hearing loss 01/15/2005    High cholesterol 2002    HTN (hypertension)     Hyperlipidemia     Hypothyroid     Hypothyroidism 06/01/1992    Kidney stones     S/P CABG x 4 2002    Stented coronary artery 06/20/2017    Wears glasses 06/01/2004    Night driving - distance     Past Surgical History:   Procedure Laterality Date    CABG  2002    LAD, OM, RCA, PLVB-RCA    COLONOSCOPY  2008    EGD      HERNIA SURGERY  2000    OTHER  2000    hernia left    TONSILLECTOMY  1954       The family history and social history have been reviewed by me today.    Family History   Problem Relation Age of Onset    Asthma Father         Dead    Heart Attack Mother         death    Diabetes Neg      Social History     Socioeconomic History    Marital status:     Number of children: 4   Occupational History    Occupation: retired-computer    Tobacco Use    Smoking status: Never    Smokeless tobacco: Never   Vaping Use    Vaping Use: Never used   Substance and Sexual Activity    Alcohol use: No    Drug use: No   Other Topics Concern    Caffeine Concern No    Exercise No    Seat Belt No    Special Diet No    Stress Concern No    Weight Concern No        Current Outpatient Medications:     PEG 3350-KCl-NaBcb-NaCl-NaSulf (PEG 3350/ELECTROLYTES) 240 g Oral Recon Soln, Take as directed by physician., Disp: 4000 mL, Rfl: 0    metFORMIN 850 MG Oral Tab, Take 1 tablet (850 mg total) by mouth 2 (two) times daily with meals., Disp: 180 tablet, Rfl: 3    atorvastatin 40 MG Oral Tab, Take 1 tablet (40 mg total) by mouth daily., Disp: 90 tablet, Rfl: 3    clopidogrel 75 MG Oral Tab, Take 1 tablet (75 mg total) by mouth daily., Disp: 90 tablet, Rfl: 1    levothyroxine 125 MCG Oral Tab, Take 1 tablet (125 mcg total) by mouth before breakfast., Disp: 90 tablet, Rfl: 3    metoprolol succinate ER 50 MG Oral Tablet 24  Hr, Take 1 tablet (50 mg total) by mouth 2 (two) times daily., Disp: 180 tablet, Rfl: 1    ONETOUCH VERIO In Vitro Strip, USE AS DIRECTED DAILY, Disp: 100 strip, Rfl: 0    ONETOUCH DELICA LANCETS 33G Does not apply Misc, USE AS DIRECTED DAILY, Disp: 100 each, Rfl: 3    aspirin 81 MG Oral Tab EC, Take 1 tablet (81 mg total) by mouth daily., Disp: 30 tablet, Rfl: 3      Review of Systems  The Review of Systems has been reviewed by me during today.  Review of Systems   Constitutional: Negative.    HENT: Negative.     Eyes: Negative.    Respiratory: Negative.     Cardiovascular: Negative.    Gastrointestinal: Negative.    Genitourinary: Negative.    Musculoskeletal: Negative.    Skin: Negative.    Neurological: Negative.    Psychiatric/Behavioral: Negative.         Physical Findings   Pulse 88   Temp 97.5 °F (36.4 °C) (Temporal)   Physical Exam  Vitals and nursing note reviewed. Exam conducted with a chaperone present.   Constitutional:       General: He is not in acute distress.     Appearance: He is well-developed.   HENT:      Head: Normocephalic and atraumatic.   Eyes:      General: No scleral icterus.  Neck:      Trachea: No tracheal deviation.   Cardiovascular:      Rate and Rhythm: Normal rate and regular rhythm.      Heart sounds: S1 normal and S2 normal. No murmur heard.  Pulmonary:      Effort: No tachypnea, accessory muscle usage or respiratory distress.      Breath sounds: No decreased breath sounds, wheezing or rhonchi.   Genitourinary:     Prostate: Not enlarged and not tender.      Rectum: Internal hemorrhoid present. No mass, tenderness, anal fissure or external hemorrhoid. Normal anal tone.      Comments: No Prostate Nodule  Anal Sphincter Intact  No Pruritis Ani  No Lichenification  No Abscess  No Fistula in ano  No Anterior Fissure  No Posterior Fissure    See Procedures:  Anoscopy reveals multiple large recurrent prolapsing internal hemorrhoids with evidence of recent bleeding.  No other lesions in  the anorectal canal.    Prostate is within normal limits for age.  Rectal tone preserved.  Skin:     General: Skin is warm and dry.   Neurological:      Mental Status: He is alert and oriented to person, place, and time.   Psychiatric:         Speech: Speech normal.         Behavior: Behavior normal. Behavior is cooperative.         Thought Content: Thought content normal.         Judgment: Judgment normal.             Assessment   1. Prolapsed internal hemorrhoids, grade 4    2. Rectal bleeding          Plan     The patient will be scheduled for stapled PPH hemorrhoidectomy.    The suzie-operative care plan was discussed with the patient, who voices understanding.  Activity and lifting recommendations were discussed in length.     The risks, benefits, and alternatives to the procedure were explained to the patient.  The risks explained include, but are not limited to, bleeding, infection, pain wound complications, recurrence, incorrect diagnosis, injury to adjacent organs and structures. We also discussed the possibile need for further therapeutic, diagnostic, or surgical intervention.  The patient voiced understanding, and after all questions were answered to the patient's satisfaction, the patient provided willing and informed consent to proceed.     No orders of the defined types were placed in this encounter.      Imaging & Referrals   None    Follow Up  No follow-ups on file.    Herbert Skelton MD

## 2024-01-11 ENCOUNTER — EKG ENCOUNTER (OUTPATIENT)
Dept: LAB | Age: 79
End: 2024-01-11
Payer: MEDICARE

## 2024-01-11 ENCOUNTER — LAB ENCOUNTER (OUTPATIENT)
Dept: LAB | Age: 79
End: 2024-01-11
Payer: MEDICARE

## 2024-01-11 DIAGNOSIS — K64.3 PROLAPSED INTERNAL HEMORRHOIDS, GRADE 4: ICD-10-CM

## 2024-01-11 DIAGNOSIS — R74.01 ELEVATED ALT MEASUREMENT: ICD-10-CM

## 2024-01-11 LAB
ALBUMIN SERPL-MCNC: 3.9 G/DL (ref 3.4–5)
ALP LIVER SERPL-CCNC: 86 U/L
ALT SERPL-CCNC: 33 U/L
AST SERPL-CCNC: 19 U/L (ref 15–37)
ATRIAL RATE: 74 BPM
BILIRUB DIRECT SERPL-MCNC: 0.2 MG/DL (ref 0–0.2)
BILIRUB SERPL-MCNC: 0.6 MG/DL (ref 0.1–2)
P AXIS: 44 DEGREES
P-R INTERVAL: 218 MS
PROT SERPL-MCNC: 7 G/DL (ref 6.4–8.2)
Q-T INTERVAL: 406 MS
QRS DURATION: 92 MS
QTC CALCULATION (BEZET): 450 MS
R AXIS: 0 DEGREES
T AXIS: 54 DEGREES
VENTRICULAR RATE: 74 BPM

## 2024-01-11 PROCEDURE — 36415 COLL VENOUS BLD VENIPUNCTURE: CPT

## 2024-01-11 PROCEDURE — 93005 ELECTROCARDIOGRAM TRACING: CPT

## 2024-01-11 PROCEDURE — 93010 ELECTROCARDIOGRAM REPORT: CPT | Performed by: INTERNAL MEDICINE

## 2024-01-11 PROCEDURE — 80076 HEPATIC FUNCTION PANEL: CPT

## 2024-01-16 ENCOUNTER — ANESTHESIA EVENT (OUTPATIENT)
Dept: SURGERY | Facility: HOSPITAL | Age: 79
End: 2024-01-16
Payer: MEDICARE

## 2024-01-17 ENCOUNTER — HOSPITAL ENCOUNTER (OUTPATIENT)
Facility: HOSPITAL | Age: 79
Setting detail: HOSPITAL OUTPATIENT SURGERY
Discharge: HOME | End: 2024-01-17
Attending: SURGERY | Admitting: SURGERY
Payer: MEDICARE

## 2024-01-17 ENCOUNTER — ANESTHESIA (OUTPATIENT)
Dept: SURGERY | Facility: HOSPITAL | Age: 79
End: 2024-01-17
Payer: MEDICARE

## 2024-01-17 VITALS
RESPIRATION RATE: 16 BRPM | DIASTOLIC BLOOD PRESSURE: 94 MMHG | HEIGHT: 71 IN | TEMPERATURE: 97 F | BODY MASS INDEX: 27.86 KG/M2 | WEIGHT: 199 LBS | SYSTOLIC BLOOD PRESSURE: 149 MMHG | OXYGEN SATURATION: 92 % | HEART RATE: 82 BPM

## 2024-01-17 DIAGNOSIS — K64.3 PROLAPSED INTERNAL HEMORRHOIDS, GRADE 4: Primary | ICD-10-CM

## 2024-01-17 LAB — GLUCOSE BLD-MCNC: 136 MG/DL (ref 70–99)

## 2024-01-17 PROCEDURE — 0DBP7ZZ EXCISION OF RECTUM, VIA NATURAL OR ARTIFICIAL OPENING: ICD-10-PCS | Performed by: SURGERY

## 2024-01-17 PROCEDURE — 46947 HEMORRHOIDOPEXY BY STAPLING: CPT | Performed by: SURGERY

## 2024-01-17 RX ORDER — HYDROMORPHONE HYDROCHLORIDE 1 MG/ML
0.2 INJECTION, SOLUTION INTRAMUSCULAR; INTRAVENOUS; SUBCUTANEOUS EVERY 5 MIN PRN
Status: DISCONTINUED | OUTPATIENT
Start: 2024-01-17 | End: 2024-01-17

## 2024-01-17 RX ORDER — METRONIDAZOLE 500 MG/100ML
500 INJECTION, SOLUTION INTRAVENOUS ONCE
Status: COMPLETED | OUTPATIENT
Start: 2024-01-17 | End: 2024-01-17

## 2024-01-17 RX ORDER — EPHEDRINE SULFATE 50 MG/ML
INJECTION INTRAVENOUS AS NEEDED
Status: DISCONTINUED | OUTPATIENT
Start: 2024-01-17 | End: 2024-01-17 | Stop reason: SURG

## 2024-01-17 RX ORDER — HEPARIN SODIUM 5000 [USP'U]/ML
5000 INJECTION, SOLUTION INTRAVENOUS; SUBCUTANEOUS ONCE
Status: COMPLETED | OUTPATIENT
Start: 2024-01-17 | End: 2024-01-17

## 2024-01-17 RX ORDER — ACETAMINOPHEN 500 MG
1000 TABLET ORAL ONCE AS NEEDED
Status: DISCONTINUED | OUTPATIENT
Start: 2024-01-17 | End: 2024-01-17

## 2024-01-17 RX ORDER — DEXTROSE MONOHYDRATE 25 G/50ML
50 INJECTION, SOLUTION INTRAVENOUS
Status: DISCONTINUED | OUTPATIENT
Start: 2024-01-17 | End: 2024-01-17 | Stop reason: HOSPADM

## 2024-01-17 RX ORDER — ACETAMINOPHEN 500 MG
1000 TABLET ORAL ONCE
Status: DISCONTINUED | OUTPATIENT
Start: 2024-01-17 | End: 2024-01-17 | Stop reason: HOSPADM

## 2024-01-17 RX ORDER — CEFAZOLIN SODIUM/WATER 2 G/20 ML
2 SYRINGE (ML) INTRAVENOUS ONCE
Status: COMPLETED | OUTPATIENT
Start: 2024-01-17 | End: 2024-01-17

## 2024-01-17 RX ORDER — SODIUM CHLORIDE, SODIUM LACTATE, POTASSIUM CHLORIDE, CALCIUM CHLORIDE 600; 310; 30; 20 MG/100ML; MG/100ML; MG/100ML; MG/100ML
INJECTION, SOLUTION INTRAVENOUS CONTINUOUS
Status: DISCONTINUED | OUTPATIENT
Start: 2024-01-17 | End: 2024-01-17

## 2024-01-17 RX ORDER — METOCLOPRAMIDE HYDROCHLORIDE 5 MG/ML
10 INJECTION INTRAMUSCULAR; INTRAVENOUS EVERY 8 HOURS PRN
Status: DISCONTINUED | OUTPATIENT
Start: 2024-01-17 | End: 2024-01-17

## 2024-01-17 RX ORDER — ONDANSETRON 2 MG/ML
INJECTION INTRAMUSCULAR; INTRAVENOUS AS NEEDED
Status: DISCONTINUED | OUTPATIENT
Start: 2024-01-17 | End: 2024-01-17 | Stop reason: SURG

## 2024-01-17 RX ORDER — NICOTINE POLACRILEX 4 MG
15 LOZENGE BUCCAL
Status: DISCONTINUED | OUTPATIENT
Start: 2024-01-17 | End: 2024-01-17 | Stop reason: HOSPADM

## 2024-01-17 RX ORDER — ROCURONIUM BROMIDE 10 MG/ML
INJECTION, SOLUTION INTRAVENOUS AS NEEDED
Status: DISCONTINUED | OUTPATIENT
Start: 2024-01-17 | End: 2024-01-17 | Stop reason: SURG

## 2024-01-17 RX ORDER — HYDROMORPHONE HYDROCHLORIDE 1 MG/ML
0.4 INJECTION, SOLUTION INTRAMUSCULAR; INTRAVENOUS; SUBCUTANEOUS EVERY 5 MIN PRN
Status: DISCONTINUED | OUTPATIENT
Start: 2024-01-17 | End: 2024-01-17

## 2024-01-17 RX ORDER — SENNA AND DOCUSATE SODIUM 50; 8.6 MG/1; MG/1
1 TABLET, FILM COATED ORAL DAILY
Qty: 30 TABLET | Refills: 0 | Status: SHIPPED | OUTPATIENT
Start: 2024-01-17

## 2024-01-17 RX ORDER — NICOTINE POLACRILEX 4 MG
30 LOZENGE BUCCAL
Status: DISCONTINUED | OUTPATIENT
Start: 2024-01-17 | End: 2024-01-17 | Stop reason: HOSPADM

## 2024-01-17 RX ORDER — ONDANSETRON 2 MG/ML
4 INJECTION INTRAMUSCULAR; INTRAVENOUS EVERY 6 HOURS PRN
Status: DISCONTINUED | OUTPATIENT
Start: 2024-01-17 | End: 2024-01-17

## 2024-01-17 RX ORDER — LIDOCAINE HYDROCHLORIDE 20 MG/ML
JELLY TOPICAL AS NEEDED
Status: DISCONTINUED | OUTPATIENT
Start: 2024-01-17 | End: 2024-01-17 | Stop reason: HOSPADM

## 2024-01-17 RX ORDER — HYDROCODONE BITARTRATE AND ACETAMINOPHEN 5; 325 MG/1; MG/1
2 TABLET ORAL ONCE AS NEEDED
Status: DISCONTINUED | OUTPATIENT
Start: 2024-01-17 | End: 2024-01-17

## 2024-01-17 RX ORDER — LIDOCAINE HYDROCHLORIDE 10 MG/ML
INJECTION, SOLUTION EPIDURAL; INFILTRATION; INTRACAUDAL; PERINEURAL AS NEEDED
Status: DISCONTINUED | OUTPATIENT
Start: 2024-01-17 | End: 2024-01-17 | Stop reason: SURG

## 2024-01-17 RX ORDER — DEXAMETHASONE SODIUM PHOSPHATE 4 MG/ML
VIAL (ML) INJECTION AS NEEDED
Status: DISCONTINUED | OUTPATIENT
Start: 2024-01-17 | End: 2024-01-17 | Stop reason: SURG

## 2024-01-17 RX ORDER — HYDROCODONE BITARTRATE AND ACETAMINOPHEN 5; 325 MG/1; MG/1
1 TABLET ORAL ONCE AS NEEDED
Status: DISCONTINUED | OUTPATIENT
Start: 2024-01-17 | End: 2024-01-17

## 2024-01-17 RX ORDER — OXYCODONE HYDROCHLORIDE 5 MG/1
5 TABLET ORAL EVERY 6 HOURS PRN
Qty: 20 TABLET | Refills: 0 | Status: SHIPPED | OUTPATIENT
Start: 2024-01-17

## 2024-01-17 RX ORDER — HYDROMORPHONE HYDROCHLORIDE 1 MG/ML
0.6 INJECTION, SOLUTION INTRAMUSCULAR; INTRAVENOUS; SUBCUTANEOUS EVERY 5 MIN PRN
Status: DISCONTINUED | OUTPATIENT
Start: 2024-01-17 | End: 2024-01-17

## 2024-01-17 RX ORDER — NALOXONE HYDROCHLORIDE 0.4 MG/ML
0.08 INJECTION, SOLUTION INTRAMUSCULAR; INTRAVENOUS; SUBCUTANEOUS AS NEEDED
Status: DISCONTINUED | OUTPATIENT
Start: 2024-01-17 | End: 2024-01-17

## 2024-01-17 RX ORDER — LIDOCAINE HYDROCHLORIDE 10 MG/ML
INJECTION, SOLUTION INFILTRATION; PERINEURAL AS NEEDED
Status: DISCONTINUED | OUTPATIENT
Start: 2024-01-17 | End: 2024-01-17 | Stop reason: HOSPADM

## 2024-01-17 RX ORDER — BUPIVACAINE HYDROCHLORIDE AND EPINEPHRINE 5; 5 MG/ML; UG/ML
INJECTION, SOLUTION EPIDURAL; INTRACAUDAL; PERINEURAL AS NEEDED
Status: DISCONTINUED | OUTPATIENT
Start: 2024-01-17 | End: 2024-01-17 | Stop reason: HOSPADM

## 2024-01-17 RX ADMIN — SODIUM CHLORIDE, SODIUM LACTATE, POTASSIUM CHLORIDE, CALCIUM CHLORIDE: 600; 310; 30; 20 INJECTION, SOLUTION INTRAVENOUS at 08:47:00

## 2024-01-17 RX ADMIN — DEXAMETHASONE SODIUM PHOSPHATE 4 MG: 4 MG/ML VIAL (ML) INJECTION at 08:12:00

## 2024-01-17 RX ADMIN — METRONIDAZOLE 500 MG: 500 INJECTION, SOLUTION INTRAVENOUS at 08:09:00

## 2024-01-17 RX ADMIN — SODIUM CHLORIDE, SODIUM LACTATE, POTASSIUM CHLORIDE, CALCIUM CHLORIDE: 600; 310; 30; 20 INJECTION, SOLUTION INTRAVENOUS at 07:49:00

## 2024-01-17 RX ADMIN — EPHEDRINE SULFATE 10 MG: 50 INJECTION INTRAVENOUS at 08:16:00

## 2024-01-17 RX ADMIN — ONDANSETRON 4 MG: 2 INJECTION INTRAMUSCULAR; INTRAVENOUS at 08:12:00

## 2024-01-17 RX ADMIN — LIDOCAINE HYDROCHLORIDE 50 MG: 10 INJECTION, SOLUTION EPIDURAL; INFILTRATION; INTRACAUDAL; PERINEURAL at 07:53:00

## 2024-01-17 RX ADMIN — EPHEDRINE SULFATE 5 MG: 50 INJECTION INTRAVENOUS at 08:32:00

## 2024-01-17 RX ADMIN — ROCURONIUM BROMIDE 10 MG: 10 INJECTION, SOLUTION INTRAVENOUS at 08:21:00

## 2024-01-17 RX ADMIN — CEFAZOLIN SODIUM/WATER 2 G: 2 G/20 ML SYRINGE (ML) INTRAVENOUS at 08:09:00

## 2024-01-17 RX ADMIN — ROCURONIUM BROMIDE 40 MG: 10 INJECTION, SOLUTION INTRAVENOUS at 07:53:00

## 2024-01-17 NOTE — OPERATIVE REPORT
OPERATIVE REPORT   PREOPERATIVE DIAGNOSIS: Prolapsed hemorrhoids.   POSTOPERATIVE DIAGNOSIS: Prolapsed hemorrhoids.   PROCEDURE PERFORMED: PPH stapled hemorrhoidectomy.   ASSISTANT: . OR staff   ANESTHESIA: General endotracheal anesthesia.   Anesthesiologist.: Cristy Soria MD  BLOOD LOSS: 2 ml.   FINDINGS:   1. Circumferentially prolapsed hemorrhoids.   2. Successful PPH stapled hemorrhoidectomy with excellent hemostasis of the staple line at the completion of the procedure.   SPECIMENS: Hemorrhoids.   COMPLICATIONS: None apparent.   DISPOSITION: The patient was awakened from anesthesia, brought to the recovery room in stable condition. The patient tolerated the procedure without apparent complication. Needle, sponge, and instrument counts were correct at the end of the procedure. Please note preprocedure antibiotic and DVT prophylaxis were administered per protocol, and time-out was performed prior to initiating the operation, identifying the correct patient, procedure, and surgeon.   INDICATIONS FOR PROCEDURE: Please review the preprocedure history and physical. Briefly, the patient is a 78 year old male with chronically prolapsed hemorrhoids. The patient presents  for treatment of prolapsed hemorrhoids.  The  risks, benefits, and alternatives were explained to the patient in detail including, but not limited to, bleeding, infection, recurrence, prolonged postoperative pain, temporary or permanent fecal incontinence, drainage, as well as need for further therapeutic, diagnostic, or surgical intervention. The patient voiced understanding. All pertinent questions were answered to the patient's satisfaction after which willing and informed consent to proceed with surgery was obtained.   OPERATIVE SUMMARY: Following adequate general anesthesia, the patient was placed in the candy cane stirrups. Perineum was painted with Betadine paint, sterile drapes placed to provide wide exposure of the anorectal canal. The  anoscope provided with the PPH stapler was inserted into the anus and secured in place with 2-0 nylon suture. A circumferential suture of #1 Prolene was used to encircle the anus at a level 2-3 cm above the dentate line, incorporating all hemorrhoidal tissues. This pursestring suture was then tied around the post of the anvil of the PPH stapling device. The tails of the suture were pulled through the channels provided by the instrument. A knot was tied, and pressure was held, pulling the hemorrhoids into the jaws of the stapler. The stapler was fired. A complete ring was identified and sent to pathology for further histopathologic diagnosis. A running locking suture line was used to reinforce the staple line as well as for hemostasis when appropriate. Once hemostasis was assured, the area was infiltrated with 0.5% Marcaine with epinephrine. Patient returned to the supine position and taken to recovery in stable postoperative condition.         Herbert Skelton MD FACS  Trauma Medical Director, [facility] General Surgery    The 21st Century Cures Act makes medical notes like these available to patients in the interest of transparency. Please be advised this is a medical document. Medical documents are intended to carry relevant information, facts as evident, and the clinical opinion of the practitioner. The medical note is intended as peer to peer communication and may appear blunt or direct. It is written in medical language and may contain abbreviations or verbiage that are unfamiliar.    This note was prepared using Dragon Medical voice recognition dictation software. As a result, errors may occur. When identified, these errors have been corrected. While every attempt is made to correct errors during dictation, discrepancies may still exist.

## 2024-01-17 NOTE — DISCHARGE INSTRUCTIONS
Home Care Instructions  Hemorrhoidectomy/Anorectal  Dr Skelton.    MEDICATIONS  You will be given a prescription for pain. Take 1-2 tablets every 4-6 hours as needed. Pain medications will ease your pain, but you should expect some incisional pain for about 2-5 days. You should walk often, cough and take deep breathes.    DIET  You will begin a high fiber diet. If your surgeon has not outlined the high fiber diet, he will provide an instruction sheet on that topic. The instruction sheet will also recommend a fiber supplement such as Metamucil, Konsyl, or Citrucel. Take the fiber supplement as instructed twice daily.    WOUND CARE You will perform sitz baths two times a day for the first two days only. Sitz baths simply mean soaking your anus in a tub of warm-hot water for about 15 minutes. Sitz baths will clean the anal wound as well as relax the anal sphincter muscles, which will help minimize your pain. Be careful around the anal wound, especially if you have stitches on the outside. Gently pat your anus dry (never rub) or simply dry your anus with a blow-dryer set to cool/warm. Do not soak your anus beyond 15 minutes or perform sitz baths beyond the first two days. Otherwise you run the risk of melting your stitches. After the first two postoperative days, you may shower twice a day for up to 15 minutes.    ACTIVITY  After surgery, your driving reflexes will be slower, especially if you are taking pain medications. Therefore, you are restricted from driving until after your follow-up visit. You may walk about the house, go shopping, see a movie, or eat at a restaurant. You may also climb stairs, but no weight lifting, power-walking, jogging, or using the “Stair-Master”.    APPOINTMENT  Please call our office for an appointment in 3 weeks after surgery, unless otherwise instructed. This will allow ample time for the swelling and soreness to resolve before your wound is examined. There may be some blood mixed with  your stools. This is normal. If you begin passing stools with mostly blood, go to the [facility] Emergency Room. If you have fevers, chills, or if you are concerned about your wound, please call us immediately at [phone].      Thank you for entrusting us with your care.  NICHELLE Humphrey

## 2024-01-17 NOTE — ANESTHESIA POSTPROCEDURE EVALUATION
[facility]    Uli Vazquez Patient Status:  Hospital Outpatient Surgery   Age/Gender 78 year old male MRN [id number]   Location [facility] SURGERY Attending Herbert Skelton MD   Hosp Day # 0 PCP Geronimo Livingston MD       Anesthesia Post-op Note    PROCEDURE FOR PROLAPSE AND HEMORRHOIDS (PPH)  STAPLED HEMORRHOIDECTOMY    Procedure Summary       Date: 01/17/24 Room / Location:  MAIN OR 03 / [facility] MAIN OR    Anesthesia Start: 0749 Anesthesia Stop: 0903    Procedure: PROCEDURE FOR PROLAPSE AND HEMORRHOIDS (PPH)  STAPLED HEMORRHOIDECTOMY (Anus) Diagnosis:       Prolapsed internal hemorrhoids, grade 4      (Prolapsed internal hemorrhoids, grade 4 [K64.3])    Surgeons: Herbert Skelton MD Anesthesiologist: Cristy Soria MD    Anesthesia Type: general ASA Status: 3            Anesthesia Type: general    Vitals Value Taken Time   /81 01/17/24 0903   Temp 97.5 01/17/24 0903   Pulse 93 01/17/24 0903   Resp 16 01/17/24 0903   SpO2 97 01/17/24 0903       Patient Location: PACU    Anesthesia Type: general    Airway Patency: patent    Postop Pain Control: adequate    Mental Status: preanesthetic baseline    Nausea/Vomiting: none    Cardiopulmonary/Hydration status: stable euvolemic    Complications: no apparent anesthesia related complications    Postop vital signs: stable    Dental Exam: Unchanged from Preop    Patient to be discharged from PACU when criteria met.

## 2024-01-17 NOTE — ANESTHESIA PROCEDURE NOTES
Airway  Date/Time: 1/17/2024 7:56 AM  Urgency: elective      General Information and Staff    Patient location during procedure: OR  Anesthesiologist: Cristy Soria MD  Performed: anesthesiologist   Performed by: Cristy Soria MD  Authorized by: Cristy Soria MD      Indications and Patient Condition  Indications for airway management: anesthesia  Sedation level: deep  Preoxygenated: yes  Patient position: sniffing  Mask difficulty assessment: 1 - vent by mask    Final Airway Details  Final airway type: endotracheal airway      Successful airway: ETT  Cuffed: yes   Successful intubation technique: Video laryngoscopy  Endotracheal tube insertion site: oral  Blade: GlideScope  Blade size: #3  ETT size (mm): 7.5    Cormack-Lehane Classification: grade I - full view of glottis  Placement verified by: capnometry   Measured from: lips  ETT to lips (cm): 23  Number of attempts at approach: 1

## 2024-01-17 NOTE — INTERVAL H&P NOTE
The H&P was reviewed by me. Patient was examined and no significant changes noted in patient's condition since H&P was performed. Discussed with patient and/or legal representative the potential benefits, risks and side effects of procedure; likelihood of patient achieving goals; and potential problems that might occur. Discussed reasonable alternatives to procedure, including risks, benefits and side effects related to the  alternatives and risks. We will proceed with procedure as planned.

## 2024-01-17 NOTE — BRIEF OP NOTE
Pre-Operative Diagnosis: Prolapsed internal hemorrhoids, grade 4 [K64.3]     Post-Operative Diagnosis: Prolapsed internal hemorrhoids, grade 4 [K64.3]      Procedure Performed:   PROCEDURE FOR PROLAPSE AND HEMORRHOIDS (PPH)  STAPLED HEMORRHOIDECTOMY    Surgeon(s) and Role:     * Herbert Skelton MD - Primary    Assistant(s):        Surgical Findings: circumferentially confluent engorged hemorrhoids.      Specimen: hemorrhoids     Estimated Blood Loss: Blood Output: 2 mL (1/17/2024  8:49 AM)      Dictation Number:      Herbert Skelton MD  1/17/2024  9:04 AM

## 2024-01-17 NOTE — ANESTHESIA PREPROCEDURE EVALUATION
PRE-OP EVALUATION    Patient Name: Uli Vazquez    Admit Diagnosis: Prolapsed internal hemorrhoids, grade 4 [K64.3]    Pre-op Diagnosis: Prolapsed internal hemorrhoids, grade 4 [K64.3]    PROCEDURE FOR PROLAPSE AND HEMORRHOIDS (PPH)  STAPLED HEMORRHOIDECTOMY    Anesthesia Procedure: PROCEDURE FOR PROLAPSE AND HEMORRHOIDS (PPH)  STAPLED HEMORRHOIDECTOMY (Anus)    Surgeon(s) and Role:     * Herbert Skelton MD - Primary    Pre-op vitals reviewed.  Temp: 98 °F (36.7 °C)  Pulse: 64  BP: 110/62  SpO2: 94 %  Body mass index is 27.75 kg/m².    Current medications reviewed.  Hospital Medications:   acetaminophen (Tylenol Extra Strength) tab 1,000 mg  1,000 mg Oral Once    glucose (Dex4) 15 GM/59ML oral liquid 15 g  15 g Oral Q15 Min PRN    Or    glucose (Glutose) 40% oral gel 15 g  15 g Oral Q15 Min PRN    Or    glucose-vitamin C (Dex-4) chewable tab 4 tablet  4 tablet Oral Q15 Min PRN    Or    dextrose 50% injection 50 mL  50 mL Intravenous Q15 Min PRN    Or    glucose (Dex4) 15 GM/59ML oral liquid 30 g  30 g Oral Q15 Min PRN    Or    glucose (Glutose) 40% oral gel 30 g  30 g Oral Q15 Min PRN    Or    glucose-vitamin C (Dex-4) chewable tab 8 tablet  8 tablet Oral Q15 Min PRN    lactated ringers infusion   Intravenous Continuous    [COMPLETED] heparin (Porcine) 5000 UNIT/ML injection 5,000 Units  5,000 Units Subcutaneous Once    ceFAZolin (Ancef) 2 g in 20mL IV syringe premix  2 g Intravenous Once    And    metRONIDAZOLE in sodium chloride 0.79% (Flagyl) 5 mg/mL IVPB premix 500 mg  500 mg Intravenous Once       Outpatient Medications:     Medications Prior to Admission   Medication Sig Dispense Refill Last Dose    PEG 3350-KCl-NaBcb-NaCl-NaSulf (PEG 3350/ELECTROLYTES) 240 g Oral Recon Soln Take as directed by physician. 4000 mL 0 1/16/2024    metFORMIN 850 MG Oral Tab Take 1 tablet (850 mg total) by mouth 2 (two) times daily with meals. 180 tablet 3 1/16/2024 at 1600    atorvastatin 40 MG Oral Tab Take 1 tablet  (40 mg total) by mouth daily. 90 tablet 3 1/16/2024 at 2100    clopidogrel 75 MG Oral Tab Take 1 tablet (75 mg total) by mouth daily. 90 tablet 1 1/11/2024 at 0900    levothyroxine 125 MCG Oral Tab Take 1 tablet (125 mcg total) by mouth before breakfast. 90 tablet 3 1/16/2024    metoprolol succinate ER 50 MG Oral Tablet 24 Hr Take 1 tablet (50 mg total) by mouth 2 (two) times daily. 180 tablet 1 1/16/2024 at 0900    ONETOUCH VERIO In Vitro Strip USE AS DIRECTED DAILY 100 strip 0 1/16/2024    ONETOUCH DELICA LANCETS 33G Does not apply Misc USE AS DIRECTED DAILY 100 each 3 1/16/2024    aspirin 81 MG Oral Tab EC Take 1 tablet (81 mg total) by mouth daily. 30 tablet 3 1/12/2024 at 0900       Allergies: Aspirin and Nitroglycerin      Anesthesia Evaluation    Patient summary reviewed.    Anesthetic Complications           GI/Hepatic/Renal    Negative GI/hepatic/renal ROS.                             Cardiovascular      ECG reviewed.      MET: >4      (+) hypertension   (+) hyperlipidemia  (+) CAD    (+) CABG/stent                            Endo/Other      (+) diabetes  type 2, not using insulin                         Pulmonary    Negative pulmonary ROS.                       Neuro/Psych    Negative neuro/psych ROS.                                  Past Surgical History:   Procedure Laterality Date    CABG  2002    LAD, OM, RCA, PLVB-RCA    COLONOSCOPY  2008    EGD      HERNIA SURGERY  2000    OTHER  2000    hernia left    TONSILLECTOMY  1954     Social History     Socioeconomic History    Marital status:     Number of children: 4   Occupational History    Occupation: retired-computer    Tobacco Use    Smoking status: Never    Smokeless tobacco: Never   Vaping Use    Vaping Use: Never used   Substance and Sexual Activity    Alcohol use: No    Drug use: No   Other Topics Concern    Caffeine Concern No    Exercise No    Seat Belt No    Special Diet No    Stress Concern No    Weight Concern No     History   Drug Use  No     Available pre-op labs reviewed.  Lab Results   Component Value Date    WBC 5.7 11/07/2023    RBC 5.58 11/07/2023    HGB 12.3 (L) 11/07/2023    HCT 42.7 11/07/2023    MCV 76.5 (L) 11/07/2023    MCH 22.0 (L) 11/07/2023    MCHC 28.8 (L) 11/07/2023    RDW 19.2 11/07/2023    .0 11/07/2023     Lab Results   Component Value Date     11/07/2023    K 4.3 11/07/2023     11/07/2023    CO2 25.0 11/07/2023    BUN 18 11/07/2023    CREATSERUM 1.26 11/07/2023     (H) 11/07/2023    CA 9.4 11/07/2023            Airway      Mallampati: III  Mouth opening: >3 FB  TM distance: > 6 cm  Neck ROM: full Cardiovascular    Cardiovascular exam normal.         Dental    Dentition appears grossly intact         Pulmonary    Pulmonary exam normal.                 Other findings              ASA: 3   Plan: general  NPO status verified and patient meets guidelines.    Post-procedure pain management plan discussed with surgeon and patient.    Comment: Discussed risks including PONV, sore throat, dental damage, cardiac and respiratory complications. All questions answered.  Plan/risks discussed with: patient  Use of blood product(s) discussed with: patient              Present on Admission:  **None**

## 2024-01-18 ENCOUNTER — TELEPHONE (OUTPATIENT)
Facility: LOCATION | Age: 79
End: 2024-01-18

## 2024-01-18 NOTE — TELEPHONE ENCOUNTER
Pt has been taking the dulcolax, but still hasn't had a bowel movement since the surgery. Says the bleeding is down. He wants to know when he should worry about not having a bowel movement.     Please advise  Best callback number is [phone]

## 2024-01-19 NOTE — TELEPHONE ENCOUNTER
S/w reports continued constipation after surgery. Denies nausea, vomiting, fever, chills, or any bleeding or drainage.  Pt is passing flatus.  Educated patient on narcotics can cause constipation and advised alternating his oxycodone with ibuprofen to help with pain control.  Also advised to take Miralax daily  and to take a dose of Milk of Magnesia and if no results after two hours take another dose of Milk of Magnesia.  Pt verbalized understanding and will call back if he develops abdominal pain, nausea, or vomiting.

## 2024-01-25 ENCOUNTER — OFFICE VISIT (OUTPATIENT)
Facility: LOCATION | Age: 79
End: 2024-01-25
Payer: MEDICARE

## 2024-01-25 VITALS — OXYGEN SATURATION: 95 % | TEMPERATURE: 98 F | HEART RATE: 76 BPM

## 2024-01-25 DIAGNOSIS — K64.3 PROLAPSED INTERNAL HEMORRHOIDS, GRADE 4: Primary | ICD-10-CM

## 2024-01-25 PROCEDURE — 99024 POSTOP FOLLOW-UP VISIT: CPT | Performed by: SURGERY

## 2024-02-02 NOTE — PROGRESS NOTES
Post Operative Visit Note       Active Problems  1. Prolapsed internal hemorrhoids, grade 4         Chief Complaint   Chief Complaint   Patient presents with    Post-Op     PO - pph stapled hemorrhoidectomy 1/17 PBP - Pt states he has minimal bleeding after BM, Pt denies any pain at this time           History of Present Illness     The patient presents for follow-up after PPH stapled hemorrhoidectomy.  The patient reports minimal bleeding and denies any pain at this time.  The patient experienced minimal pain around the time of surgery which is no resolved.  Patient is having near normal bowel and bladder function.  The patient is very satisfied with his surgical outcome.    The patient denies fever, chills, chest pain, shortness of breath, dyspnea. The patient also denies hematemesis, melena, or hematochezia. The patient denies change in bowel or bladder habits. There is no complaint of hematuria or dysuria.    Allergies  Uli is allergic to aspirin and nitroglycerin.    Past Medical / Surgical / Social / Family History    The past medical and past surgical history have been reviewed by me today.     Past Medical History:   Diagnosis Date    Abdominal hernia 10/06/2000    had a mesh inplanted    Atherosclerosis of coronary artery 06/01/2002    CAD (coronary artery disease)     Calculus of kidney 09/01/2000    Diabetes (HCC)     Disorder of thyroid     Essential hypertension 06/01/2002    Hearing loss 01/15/2005    High blood pressure     High cholesterol 2002    HTN (hypertension)     Hyperlipidemia     Hypothyroid     Hypothyroidism 06/01/1992    Kidney stones     S/P CABG x 4 2002    Stented coronary artery 06/20/2017    Visual impairment     glasses for night driving    Wears glasses 06/01/2004    Night driving - distance     Past Surgical History:   Procedure Laterality Date    CABG  2002    LAD, OM, RCA, PLVB-RCA    COLONOSCOPY  2008    EGD      HERNIA SURGERY  2000    OTHER  2000    hernia left     TONSILLECTOMY  1954       The family history and social history have been reviewed by me today.    Family History   Problem Relation Age of Onset    Asthma Father         Dead    Heart Attack Mother         death    Diabetes Neg      Social History     Socioeconomic History    Marital status:     Number of children: 4   Occupational History    Occupation: retired-computer    Tobacco Use    Smoking status: Never    Smokeless tobacco: Never   Vaping Use    Vaping Use: Never used   Substance and Sexual Activity    Alcohol use: No    Drug use: No   Other Topics Concern    Caffeine Concern No    Exercise No    Seat Belt No    Special Diet No    Stress Concern No    Weight Concern No        Current Outpatient Medications:     oxyCODONE 5 MG Oral Tab, Take 1 tablet (5 mg total) by mouth every 6 (six) hours as needed for Pain., Disp: 20 tablet, Rfl: 0    senna-docusate 8.6-50 MG Oral Tab, Take 1 tablet by mouth daily., Disp: 30 tablet, Rfl: 0    PEG 3350-KCl-NaBcb-NaCl-NaSulf (PEG 3350/ELECTROLYTES) 240 g Oral Recon Soln, Take as directed by physician., Disp: 4000 mL, Rfl: 0    metFORMIN 850 MG Oral Tab, Take 1 tablet (850 mg total) by mouth 2 (two) times daily with meals., Disp: 180 tablet, Rfl: 3    atorvastatin 40 MG Oral Tab, Take 1 tablet (40 mg total) by mouth daily., Disp: 90 tablet, Rfl: 3    clopidogrel 75 MG Oral Tab, Take 1 tablet (75 mg total) by mouth daily., Disp: 90 tablet, Rfl: 1    levothyroxine 125 MCG Oral Tab, Take 1 tablet (125 mcg total) by mouth before breakfast., Disp: 90 tablet, Rfl: 3    metoprolol succinate ER 50 MG Oral Tablet 24 Hr, Take 1 tablet (50 mg total) by mouth 2 (two) times daily., Disp: 180 tablet, Rfl: 1    ONETOUCH VERIO In Vitro Strip, USE AS DIRECTED DAILY, Disp: 100 strip, Rfl: 0    ONETOUCH DELICA LANCETS 33G Does not apply Misc, USE AS DIRECTED DAILY, Disp: 100 each, Rfl: 3    aspirin 81 MG Oral Tab EC, Take 1 tablet (81 mg total) by mouth daily., Disp: 30 tablet, Rfl: 3       Review of Systems  The Review of Systems has been reviewed by me during today.  Review of Systems    Physical Findings   Pulse 76   Temp 97.7 °F (36.5 °C)   SpO2 95%   Physical Exam  Constitutional:       Appearance: He is well-developed.   HENT:      Head: Normocephalic and atraumatic.   Eyes:      General: No scleral icterus.  Neck:      Trachea: No tracheal deviation.   Genitourinary:     Prostate: Not enlarged and not tender.      Rectum: No mass, tenderness, anal fissure, external hemorrhoid or internal hemorrhoid. Normal anal tone.      Comments: No Prostate Nodule  Anal Sphincter Intact  No Pruritis Ani  No Lichenification  No Abscess  No Fistula in ano  No Anterior Fissure  No Posterior Fissure    JANNIE reveals staple line circumferentially intact.  Minimal edema and no ecchymosis.  Skin:     General: Skin is warm and dry.   Neurological:      Mental Status: He is alert and oriented to person, place, and time.   Psychiatric:         Speech: Speech normal.         Behavior: Behavior normal. Behavior is cooperative.         Thought Content: Thought content normal.         Judgment: Judgment normal.             Assessment   1. Prolapsed internal hemorrhoids, grade 4          Plan     The patient is recovering nicely following PPH stapled hemorrhoidectomy.    The anticipated postoperative recovery was discussed with the patient in detail.    Dietary, activity, and exercise recommendations along with restrictions were discussed with the patient during today's visit.    Wound care instructions were discussed during today's visit.    The patient will return to my attention on an as needed basis.    The patient is encouraged to continue seeing the primary care physician for ongoing medical needs.    The patient was given ample opportunity to ask questions.  The patient's questions were answered in detail and to the patient's satisfaction.  The patient voiced understanding of the postoperative care plan.            No orders of the defined types were placed in this encounter.      Imaging & Referrals   None    Follow Up  No follow-ups on file.    Herbert Skelton MD

## 2024-06-14 DIAGNOSIS — Z95.1 S/P CABG X 4: ICD-10-CM

## 2024-06-14 DIAGNOSIS — I25.10 CORONARY ARTERY DISEASE INVOLVING NATIVE CORONARY ARTERY OF NATIVE HEART WITHOUT ANGINA PECTORIS: ICD-10-CM

## 2024-06-14 RX ORDER — METOPROLOL SUCCINATE 50 MG/1
50 TABLET, EXTENDED RELEASE ORAL 2 TIMES DAILY
Qty: 180 TABLET | Refills: 0 | Status: SHIPPED | OUTPATIENT
Start: 2024-06-14

## 2024-06-14 RX ORDER — CLOPIDOGREL BISULFATE 75 MG/1
75 TABLET ORAL DAILY
Qty: 90 TABLET | Refills: 1 | Status: CANCELLED | OUTPATIENT
Start: 2024-06-14

## 2024-06-14 RX ORDER — CLOPIDOGREL BISULFATE 75 MG/1
75 TABLET ORAL DAILY
Qty: 90 TABLET | Refills: 0 | Status: SHIPPED | OUTPATIENT
Start: 2024-06-14

## 2024-06-14 RX ORDER — METOPROLOL SUCCINATE 50 MG/1
50 TABLET, EXTENDED RELEASE ORAL 2 TIMES DAILY
Qty: 180 TABLET | Refills: 1 | Status: CANCELLED | OUTPATIENT
Start: 2024-06-14

## 2024-06-14 NOTE — TELEPHONE ENCOUNTER
Last time medication was refilled 11/09/2023  Last office visit  11/09/2023  Next office visit due/scheduled No future appointments.    Medication failed protocol.

## 2024-08-30 ENCOUNTER — OFFICE VISIT (OUTPATIENT)
Dept: INTERNAL MEDICINE CLINIC | Facility: CLINIC | Age: 79
End: 2024-08-30
Payer: MEDICARE

## 2024-08-30 VITALS
BODY MASS INDEX: 28.42 KG/M2 | RESPIRATION RATE: 18 BRPM | HEIGHT: 71 IN | SYSTOLIC BLOOD PRESSURE: 136 MMHG | WEIGHT: 203 LBS | OXYGEN SATURATION: 94 % | DIASTOLIC BLOOD PRESSURE: 86 MMHG | HEART RATE: 90 BPM | TEMPERATURE: 98 F

## 2024-08-30 DIAGNOSIS — J01.10 ACUTE NON-RECURRENT FRONTAL SINUSITIS: Primary | ICD-10-CM

## 2024-08-30 PROCEDURE — 99213 OFFICE O/P EST LOW 20 MIN: CPT | Performed by: NURSE PRACTITIONER

## 2024-08-30 RX ORDER — PREDNISONE 20 MG/1
40 TABLET ORAL DAILY
Qty: 14 TABLET | Refills: 0 | Status: SHIPPED | OUTPATIENT
Start: 2024-08-30 | End: 2024-09-06

## 2024-08-30 NOTE — PROGRESS NOTES
HPI:   Patient presents with symptoms of cough, congestion, sinus pressure for 12 days.   Current treatment includes completion of zpak, mucinex-d    Current Outpatient Medications   Medication Sig Dispense Refill    predniSONE 20 MG Oral Tab Take 2 tablets (40 mg total) by mouth daily for 7 days. 14 tablet 0    amoxicillin clavulanate 875-125 MG Oral Tab Take 1 tablet by mouth 2 (two) times daily for 10 days. 20 tablet 0    METOPROLOL SUCCINATE ER 50 MG Oral Tablet 24 Hr TAKE ONE TABLET BY MOUTH TWICE DAILY 180 tablet 0    CLOPIDOGREL 75 MG Oral Tab TAKE ONE TABLET BY MOUTH ONCE DAILY 90 tablet 0    metFORMIN 850 MG Oral Tab Take 1 tablet (850 mg total) by mouth 2 (two) times daily with meals. 180 tablet 0    senna-docusate 8.6-50 MG Oral Tab Take 1 tablet by mouth daily. 30 tablet 0    atorvastatin 40 MG Oral Tab Take 1 tablet (40 mg total) by mouth daily. 90 tablet 3    levothyroxine 125 MCG Oral Tab Take 1 tablet (125 mcg total) by mouth before breakfast. 90 tablet 3    ONETOUCH VERIO In Vitro Strip USE AS DIRECTED DAILY 100 strip 0    ONETOUCH DELICA LANCETS 33G Does not apply Misc USE AS DIRECTED DAILY 100 each 3    aspirin 81 MG Oral Tab EC Take 1 tablet (81 mg total) by mouth daily. 30 tablet 3      Past Medical History:    Abdominal hernia    had a mesh inplanted    Atherosclerosis of coronary artery    CAD (coronary artery disease)    Calculus of kidney    Diabetes (HCC)    Disorder of thyroid    Essential hypertension    Hearing loss    High blood pressure    High cholesterol    HTN (hypertension)    Hyperlipidemia    Hypothyroid    Hypothyroidism    Kidney stones    S/P CABG x 4    Stented coronary artery    Visual impairment    glasses for night driving    Wears glasses    Night driving - distance      Social History:  Social History     Socioeconomic History    Marital status:     Number of children: 4   Occupational History    Occupation: retired-computer    Tobacco Use    Smoking status: Never     Smokeless tobacco: Never   Vaping Use    Vaping status: Never Used   Substance and Sexual Activity    Alcohol use: No    Drug use: No   Other Topics Concern    Caffeine Concern No    Exercise No    Seat Belt No    Special Diet No    Stress Concern No    Weight Concern No   Social History Narrative    3 stepchildren , 1 biologic child.          REVIEW OF SYSTEMS:   GENERAL HEALTH: feels well otherwise  SKIN: denies any unusual skin lesions or rashes  RESPIRATORY: no shortness of breath with exertion  CARDIOVASCULAR: denies chest pain on exertion  GI: no nausea or vomiting  NEURO: denies headaches    EXAM:   /86   Pulse 90   Temp 98 °F (36.7 °C) (Oral)   Resp 18   Ht 5' 11\" (1.803 m)   Wt 203 lb (92.1 kg)   SpO2 94%   BMI 28.31 kg/m²   GENERAL: well developed, well nourished,in no apparent distress  SKIN: no rashes,no suspicious lesions  ENT: bilateral tympanic membrane WNL, nasal mucosal edema, sinus tenderness, uvula midline, oropharyngeal exudate and erythema     CV: RRR no murmur  PULM: cta b/l breathing is unlabored  GI: good BS's, non tender    ASSESSMENT AND PLAN:     Encounter Diagnosis   Name Primary?    Acute non-recurrent frontal sinusitis Yes        Requested Prescriptions     Signed Prescriptions Disp Refills    predniSONE 20 MG Oral Tab 14 tablet 0     Sig: Take 2 tablets (40 mg total) by mouth daily for 7 days.    amoxicillin clavulanate 875-125 MG Oral Tab 20 tablet 0     Sig: Take 1 tablet by mouth 2 (two) times daily for 10 days.       Instructions given on increasing fluid intake  The patient indicates understanding of these issues and agrees to the plan.  The patient is asked to return in 3 days if not better. Call if fever or worsening symptoms.

## 2024-09-04 ENCOUNTER — OFFICE VISIT (OUTPATIENT)
Facility: LOCATION | Age: 79
End: 2024-09-04
Payer: MEDICARE

## 2024-09-04 VITALS
RESPIRATION RATE: 21 BRPM | BODY MASS INDEX: 28.42 KG/M2 | TEMPERATURE: 97 F | HEIGHT: 71 IN | HEART RATE: 80 BPM | WEIGHT: 203 LBS | OXYGEN SATURATION: 98 %

## 2024-09-04 DIAGNOSIS — K62.5 RECTAL BLEEDING: Primary | ICD-10-CM

## 2024-09-04 DIAGNOSIS — K64.8 INTERNAL HEMORRHOIDS WITH COMPLICATION: ICD-10-CM

## 2024-09-04 PROCEDURE — 99213 OFFICE O/P EST LOW 20 MIN: CPT | Performed by: SURGERY

## 2024-09-04 PROCEDURE — 46600 DIAGNOSTIC ANOSCOPY SPX: CPT | Performed by: SURGERY

## 2024-09-04 NOTE — PROGRESS NOTES
Follow Up Visit Note       Active Problems      1. Rectal bleeding    2. Internal hemorrhoids with complication          Chief Complaint   Chief Complaint   Patient presents with    Follow - Up     blood in stool in early part of August after having hemorrhoidectomy - not all the time.         History of Present Illness    The patient presents for evaluation of new rectal bleeding.  The patient has been improving steadily following PPH stapled hemorrhoidectomy.  In early August, however, he experienced a few episodes of bright red blood per rectum after straining and after passing firm bowel movement.  The episodes have resolved but the patient presents for reevaluation.  No other complaints offered.    The patient denies fever, chills, chest pain, shortness of breath, dyspnea. The patient also denies hematemesis, melena. The patient denies change in bowel or bladder habits. There is no complaint of hematuria or dysuria.    Allergies  Uli is allergic to aspirin and nitroglycerin.    Past Medical / Surgical / Social / Family History    The past medical and past surgical history have been reviewed by me today.    Past Medical History:    Abdominal hernia    had a mesh inplanted    Atherosclerosis of coronary artery    CAD (coronary artery disease)    Calculus of kidney    Diabetes (HCC)    Disorder of thyroid    Essential hypertension    Hearing loss    High blood pressure    High cholesterol    HTN (hypertension)    Hyperlipidemia    Hypothyroid    Hypothyroidism    Kidney stones    S/P CABG x 4    Stented coronary artery    Visual impairment    glasses for night driving    Wears glasses    Night driving - distance     Past Surgical History:   Procedure Laterality Date    Cabg  2002    LAD, OM, RCA, PLVB-RCA    Colonoscopy  2008    Egd      Hernia surgery  2000    Other  2000    hernia left    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    Family History   Problem Relation Age of Onset    Asthma Father         Dead    Heart Attack Mother         death    Heart Disease Mother         Death  1978    Diabetes Neg      Social History     Socioeconomic History    Marital status:     Number of children: 4   Occupational History    Occupation: retired-computer    Tobacco Use    Smoking status: Never    Smokeless tobacco: Never   Vaping Use    Vaping status: Never Used   Substance and Sexual Activity    Alcohol use: No    Drug use: No   Other Topics Concern    Caffeine Concern No    Exercise No    Seat Belt No    Special Diet No    Stress Concern No    Weight Concern No        Current Outpatient Medications:     predniSONE 20 MG Oral Tab, Take 2 tablets (40 mg total) by mouth daily for 7 days., Disp: 14 tablet, Rfl: 0    amoxicillin clavulanate 875-125 MG Oral Tab, Take 1 tablet by mouth 2 (two) times daily for 10 days., Disp: 20 tablet, Rfl: 0    METOPROLOL SUCCINATE ER 50 MG Oral Tablet 24 Hr, TAKE ONE TABLET BY MOUTH TWICE DAILY, Disp: 180 tablet, Rfl: 0    CLOPIDOGREL 75 MG Oral Tab, TAKE ONE TABLET BY MOUTH ONCE DAILY, Disp: 90 tablet, Rfl: 0    metFORMIN 850 MG Oral Tab, Take 1 tablet (850 mg total) by mouth 2 (two) times daily with meals., Disp: 180 tablet, Rfl: 0    senna-docusate 8.6-50 MG Oral Tab, Take 1 tablet by mouth daily., Disp: 30 tablet, Rfl: 0    atorvastatin 40 MG Oral Tab, Take 1 tablet (40 mg total) by mouth daily., Disp: 90 tablet, Rfl: 3    levothyroxine 125 MCG Oral Tab, Take 1 tablet (125 mcg total) by mouth before breakfast., Disp: 90 tablet, Rfl: 3    ONETOUCH VERIO In Vitro Strip, USE AS DIRECTED DAILY, Disp: 100 strip, Rfl: 0    ONETOUCH DELICA LANCETS 33G Does not apply Misc, USE AS DIRECTED DAILY, Disp: 100 each, Rfl: 3    aspirin 81 MG Oral Tab EC, Take 1 tablet (81 mg total) by mouth daily., Disp: 30 tablet, Rfl: 3     Review of Systems  The  Review of Systems has been reviewed by me during today.  Review of Systems   Constitutional: Negative.    Respiratory: Negative.     Cardiovascular: Negative.    Gastrointestinal: Negative.    Skin: Negative.    Neurological: Negative.         Physical Findings   Pulse 80   Temp 97.1 °F (36.2 °C) (Temporal)   Resp 21   Ht 71\"   Wt 203 lb (92.1 kg)   SpO2 98%   BMI 28.31 kg/m²   Physical Exam  Exam conducted with a chaperone present.   Constitutional:       Appearance: He is well-developed.   HENT:      Head: Normocephalic and atraumatic.   Eyes:      General: No scleral icterus.  Neck:      Trachea: No tracheal deviation.   Genitourinary:     Prostate: Not enlarged and not tender.      Rectum: No mass, tenderness, anal fissure, external hemorrhoid or internal hemorrhoid. Normal anal tone.      Comments: No Prostate Nodule  Anal Sphincter Intact  No Pruritis Ani  No Lichenification  No Abscess  No Fistula in ano  No Anterior Fissure  No Posterior Fissure    See Procedures:  Anoscopy reveals the PPH staple line is well-healed.  There are what appeared to be 2 new prominent internal hemorrhoids.  No other lesions visible within view of the anoscope.    Skin:     General: Skin is warm and dry.   Neurological:      Mental Status: He is alert and oriented to person, place, and time.   Psychiatric:         Speech: Speech normal.         Behavior: Behavior normal. Behavior is cooperative.         Thought Content: Thought content normal.         Judgment: Judgment normal.          Assessment   1. Rectal bleeding    2. Internal hemorrhoids with complication        Plan     The patient has what appear to be 2 new prominent internal hemorrhoids.    The patient will be scheduled for in -office rubber band ligation of 2  new internal hemorrhoids.    The suzie-operative care plan was discussed with the patient, who voices understanding.  Activity and lifting recommendations were discussed in length.     The risks, benefits,  and alternatives to the procedure were explained to the patient.  The risks explained include, but are not limited to, bleeding, infection, pain wound complications, recurrence, incorrect diagnosis, injury to adjacent organs and structures. We also discussed the possibile need for further therapeutic, diagnostic, or surgical intervention.  The patient voiced understanding, and after all questions were answered to the patient's satisfaction, the patient provided willing and informed consent to proceed.     No orders of the defined types were placed in this encounter.      Imaging & Referrals   None    Follow Up  No follow-ups on file.    Herbert Skelton MD

## 2024-09-10 DIAGNOSIS — Z95.1 S/P CABG X 4: ICD-10-CM

## 2024-09-10 DIAGNOSIS — I25.10 CORONARY ARTERY DISEASE INVOLVING NATIVE CORONARY ARTERY OF NATIVE HEART WITHOUT ANGINA PECTORIS: ICD-10-CM

## 2024-09-10 RX ORDER — CLOPIDOGREL BISULFATE 75 MG/1
75 TABLET ORAL DAILY
Qty: 90 TABLET | Refills: 0 | Status: SHIPPED | OUTPATIENT
Start: 2024-09-10

## 2024-09-10 RX ORDER — METOPROLOL SUCCINATE 50 MG/1
50 TABLET, EXTENDED RELEASE ORAL 2 TIMES DAILY
Qty: 180 TABLET | Refills: 0 | Status: SHIPPED | OUTPATIENT
Start: 2024-09-10

## 2024-09-10 NOTE — TELEPHONE ENCOUNTER
Clopidogrel 75 MG  Last time medication was refilled 06/14/2024  Last office visit  08/30/2024  Next office visit due/scheduled No Future Appointments  Medication not on protocol.        Metoprolol 50 MG   Last time medication was refilled 06/14/2024  Last office visit  08/30/2024  Next office visit due/scheduled No Future Appointments  Passed protocol, Medication sent.

## 2024-09-19 ENCOUNTER — OFFICE VISIT (OUTPATIENT)
Facility: LOCATION | Age: 79
End: 2024-09-19
Payer: MEDICARE

## 2024-09-19 VITALS
HEART RATE: 86 BPM | HEIGHT: 71 IN | BODY MASS INDEX: 28 KG/M2 | TEMPERATURE: 98 F | OXYGEN SATURATION: 97 % | RESPIRATION RATE: 16 BRPM

## 2024-09-19 DIAGNOSIS — K64.8 INTERNAL HEMORRHOIDS WITH COMPLICATION: Primary | ICD-10-CM

## 2024-09-19 PROCEDURE — 46221 LIGATION OF HEMORRHOID(S): CPT | Performed by: SURGERY

## 2024-09-19 NOTE — PROGRESS NOTES
Follow Up Visit Note       Active Problems      No diagnosis found.      Chief Complaint   Chief Complaint   Patient presents with    Hemorrhoid Banding         History of Present Illness        Allergies  Uli is allergic to aspirin and nitroglycerin.    Past Medical / Surgical / Social / Family History    The past medical and past surgical history have been reviewed by me today.    Past Medical History:    Abdominal hernia    had a mesh inplanted    Atherosclerosis of coronary artery    CAD (coronary artery disease)    Calculus of kidney    Diabetes (HCC)    Disorder of thyroid    Essential hypertension    Hearing loss    High blood pressure    High cholesterol    HTN (hypertension)    Hyperlipidemia    Hypothyroid    Hypothyroidism    Kidney stones    S/P CABG x 4    Stented coronary artery    Visual impairment    glasses for night driving    Wears glasses    Night driving - distance     Past Surgical History:   Procedure Laterality Date    Cabg  2002    LAD, OM, RCA, PLVB-RCA    Colonoscopy  2008    Egd      Hernia surgery  2000    Other  2000    hernia left    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    Family History   Problem Relation Age of Onset    Asthma Father         Dead    Heart Attack Mother         death    Heart Disease Mother         Death  1978    Diabetes Neg      Social History     Socioeconomic History    Marital status:     Number of children: 4   Occupational History    Occupation: retired-computer    Tobacco Use    Smoking status: Never    Smokeless tobacco: Never   Vaping Use    Vaping status: Never Used   Substance and Sexual Activity    Alcohol use: No    Drug use: No   Other Topics Concern    Caffeine Concern No    Exercise No    Seat Belt No    Special Diet No    Stress Concern No    Weight Concern No        Current Outpatient  Medications:     metoprolol succinate ER 50 MG Oral Tablet 24 Hr, Take 1 tablet (50 mg total) by mouth 2 (two) times daily., Disp: 180 tablet, Rfl: 0    clopidogrel 75 MG Oral Tab, Take 1 tablet (75 mg total) by mouth daily., Disp: 90 tablet, Rfl: 0    metFORMIN 850 MG Oral Tab, Take 1 tablet (850 mg total) by mouth 2 (two) times daily with meals., Disp: 180 tablet, Rfl: 0    senna-docusate 8.6-50 MG Oral Tab, Take 1 tablet by mouth daily., Disp: 30 tablet, Rfl: 0    atorvastatin 40 MG Oral Tab, Take 1 tablet (40 mg total) by mouth daily., Disp: 90 tablet, Rfl: 3    levothyroxine 125 MCG Oral Tab, Take 1 tablet (125 mcg total) by mouth before breakfast., Disp: 90 tablet, Rfl: 3    ONETOUCH VERIO In Vitro Strip, USE AS DIRECTED DAILY, Disp: 100 strip, Rfl: 0    ONETOUCH DELICA LANCETS 33G Does not apply Misc, USE AS DIRECTED DAILY, Disp: 100 each, Rfl: 3    aspirin 81 MG Oral Tab EC, Take 1 tablet (81 mg total) by mouth daily., Disp: 30 tablet, Rfl: 3     Review of Systems  The Review of Systems has been reviewed by me during today.  Review of Systems   Constitutional: Negative.    HENT: Negative.     Eyes: Negative.    Respiratory: Negative.     Cardiovascular: Negative.    Gastrointestinal: Negative.    Genitourinary: Negative.    Musculoskeletal: Negative.    Skin: Negative.    Neurological: Negative.    Psychiatric/Behavioral: Negative.          Physical Findings   There were no vitals taken for this visit.  Physical Exam     Assessment   No diagnosis found.    Plan        No orders of the defined types were placed in this encounter.      Imaging & Referrals   None    Follow Up  No follow-ups on file.    Herbert Skelton MD

## 2024-09-19 NOTE — PROGRESS NOTES
Follow Up Visit Note       Active Problems      1. Internal hemorrhoids with complication          Chief Complaint   Chief Complaint   Patient presents with    Hemorrhoid Banding         History of Present Illness    The patient presents for rubber band ligation of internal hemorrhoids.  The patient has no new complaints since our last encounter.     Allergies  Uli is allergic to aspirin and nitroglycerin.    Past Medical / Surgical / Social / Family History    The past medical and past surgical history have been reviewed by me today.    Past Medical History:    Abdominal hernia    had a mesh inplanted    Atherosclerosis of coronary artery    CAD (coronary artery disease)    Calculus of kidney    Diabetes (HCC)    Disorder of thyroid    Essential hypertension    Hearing loss    High blood pressure    High cholesterol    HTN (hypertension)    Hyperlipidemia    Hypothyroid    Hypothyroidism    Kidney stones    S/P CABG x 4    Stented coronary artery    Visual impairment    glasses for night driving    Wears glasses    Night driving - distance     Past Surgical History:   Procedure Laterality Date    Cabg  2002    LAD, OM, RCA, PLVB-RCA    Colonoscopy  2008    Egd      Hernia surgery  2000    Other  2000    hernia left    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    Family History   Problem Relation Age of Onset    Asthma Father         Dead    Heart Attack Mother         death    Heart Disease Mother         Death  1978    Diabetes Neg      Social History     Socioeconomic History    Marital status:     Number of children: 4   Occupational History    Occupation: retired-computer    Tobacco Use    Smoking status: Never    Smokeless tobacco: Never   Vaping Use    Vaping status: Never Used   Substance and Sexual Activity    Alcohol use: No    Drug use: No   Other  Topics Concern    Caffeine Concern No    Exercise No    Seat Belt No    Special Diet No    Stress Concern No    Weight Concern No      Current Outpatient Medications   Medication Sig Dispense Refill    metoprolol succinate ER 50 MG Oral Tablet 24 Hr Take 1 tablet (50 mg total) by mouth 2 (two) times daily. 180 tablet 0    clopidogrel 75 MG Oral Tab Take 1 tablet (75 mg total) by mouth daily. 90 tablet 0    metFORMIN 850 MG Oral Tab Take 1 tablet (850 mg total) by mouth 2 (two) times daily with meals. 180 tablet 0    senna-docusate 8.6-50 MG Oral Tab Take 1 tablet by mouth daily. 30 tablet 0    atorvastatin 40 MG Oral Tab Take 1 tablet (40 mg total) by mouth daily. 90 tablet 3    levothyroxine 125 MCG Oral Tab Take 1 tablet (125 mcg total) by mouth before breakfast. 90 tablet 3    ONETOUCH VERIO In Vitro Strip USE AS DIRECTED DAILY 100 strip 0    ONETOUCH DELICA LANCETS 33G Does not apply Misc USE AS DIRECTED DAILY 100 each 3    aspirin 81 MG Oral Tab EC Take 1 tablet (81 mg total) by mouth daily. 30 tablet 3        Review of Systems  The Review of Systems has been reviewed by me during today.  Review of Systems     Physical Findings   Pulse 86   Temp 98.2 °F (36.8 °C) (Temporal)   Resp 16   Ht 71\"   SpO2 97%   BMI 28.31 kg/m²   Pre op diagnosis: Internal Hemorrhoids    Post op diagnosis: Same    Procedure: Anoscopy with O-ring Rubber Band Ligation of Internal Hemorrhoids    History of present illness:   This patient has internal hemorrhoids that are symptomatic.    Operative findings:   The internal hemorrhoid was successfully ligated in the standard fashion with an O-ring ligator.      Operative summary:  The patient was draped and exposed in the usual fashion.    A medical assistant was present at all times.    External visualization of the perineum and gluteal cleft was performed.    Digital exam was performed with a well lubricated examining finger.    Diagnostic Anoscopy was performed with  lubrication.    The anal canal was well visualized.    An internal hemorrhoid was identified and well visualized.    The internal hemorrhoid was grasped well above the dentate line with the grasper.    The internal hemorrhoid was pulled within the O-ring ligator.    The O-ring ligator was fired without complication.    A 5% phenol solution was injected into the hemorrhoid and at its base.    The patient tolerated the procedure and was observed in our office for at least 5 minutes prior to discharge.    All findings are listed in the physical exam section of this note.           Assessment   1. Internal hemorrhoids with complication        Plan     Successful treatment of a new internal hemorrhoid of the right lateral position by rubber band ligation and phenol injection.    The care plan is discussed with the patient who voiced understanding.    Dietary, activity, and exercise recommendations were discussed with the patient.    The patient is encouraged to avoid straining, continue dietary fiber supplementation, stool softeners, and adequate hydration.    Follow-up in 2 weeks for continued treatment of internal hemorrhoids.    The patient was provided ample opportunity to ask questions.  All of the patient's questions were answered in detail.  The patient voiced understanding of the care plan.       No orders of the defined types were placed in this encounter.      Imaging & Referrals   None    Follow Up  No follow-ups on file.    Herbert Skelton MD

## 2024-10-16 ENCOUNTER — OFFICE VISIT (OUTPATIENT)
Facility: LOCATION | Age: 79
End: 2024-10-16
Payer: MEDICARE

## 2024-10-16 VITALS
TEMPERATURE: 97 F | WEIGHT: 203 LBS | SYSTOLIC BLOOD PRESSURE: 135 MMHG | OXYGEN SATURATION: 98 % | BODY MASS INDEX: 28.42 KG/M2 | HEART RATE: 85 BPM | HEIGHT: 71 IN | DIASTOLIC BLOOD PRESSURE: 84 MMHG | RESPIRATION RATE: 18 BRPM

## 2024-10-16 DIAGNOSIS — K64.3 PROLAPSED INTERNAL HEMORRHOIDS, GRADE 4: Primary | ICD-10-CM

## 2024-10-16 PROCEDURE — 46221 LIGATION OF HEMORRHOID(S): CPT | Performed by: SURGERY

## 2024-10-16 NOTE — PROGRESS NOTES
Follow Up Visit Note       Active Problems      1. Prolapsed internal hemorrhoids, grade 4          Chief Complaint   Chief Complaint   Patient presents with    Hemorrhoid Banding     2nd banding         History of Present Illness    The patient presents for rubber band ligation of internal hemorrhoids.  The patient has no new complaints since our last encounter.     Allergies  Uli is allergic to aspirin and nitroglycerin.    Past Medical / Surgical / Social / Family History    The past medical and past surgical history have been reviewed by me today.    Past Medical History:    Abdominal hernia    had a mesh inplanted    Atherosclerosis of coronary artery    CAD (coronary artery disease)    Calculus of kidney    Diabetes (HCC)    Disorder of thyroid    Essential hypertension    Hearing loss    High blood pressure    High cholesterol    HTN (hypertension)    Hyperlipidemia    Hypothyroid    Hypothyroidism    Kidney stones    S/P CABG x 4    Stented coronary artery    Visual impairment    glasses for night driving    Wears glasses    Night driving - distance     Past Surgical History:   Procedure Laterality Date    Cabg  2002    LAD, OM, RCA, PLVB-RCA    Colonoscopy  2008    Egd      Hernia surgery  2000    Other  2000    hernia left    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    Family History   Problem Relation Age of Onset    Asthma Father         Dead    Heart Attack Mother         death    Heart Disease Mother         Death  1978    Diabetes Neg      Social History     Socioeconomic History    Marital status:     Number of children: 4   Occupational History    Occupation: retired-computer    Tobacco Use    Smoking status: Never    Smokeless tobacco: Never   Vaping Use    Vaping status: Never Used   Substance and Sexual Activity    Alcohol use: No    Drug  use: No   Other Topics Concern    Caffeine Concern No    Exercise No    Seat Belt No    Special Diet No    Stress Concern No    Weight Concern No      Current Outpatient Medications   Medication Sig Dispense Refill    metoprolol succinate ER 50 MG Oral Tablet 24 Hr Take 1 tablet (50 mg total) by mouth 2 (two) times daily. 180 tablet 0    clopidogrel 75 MG Oral Tab Take 1 tablet (75 mg total) by mouth daily. 90 tablet 0    metFORMIN 850 MG Oral Tab Take 1 tablet (850 mg total) by mouth 2 (two) times daily with meals. 180 tablet 0    senna-docusate 8.6-50 MG Oral Tab Take 1 tablet by mouth daily. 30 tablet 0    atorvastatin 40 MG Oral Tab Take 1 tablet (40 mg total) by mouth daily. 90 tablet 3    levothyroxine 125 MCG Oral Tab Take 1 tablet (125 mcg total) by mouth before breakfast. 90 tablet 3    ONETOUCH VERIO In Vitro Strip USE AS DIRECTED DAILY 100 strip 0    ONETOUCH DELICA LANCETS 33G Does not apply Misc USE AS DIRECTED DAILY 100 each 3    aspirin 81 MG Oral Tab EC Take 1 tablet (81 mg total) by mouth daily. 30 tablet 3        Review of Systems  The Review of Systems has been reviewed by me during today.  Review of Systems     Physical Findings   /84   Pulse 85   Temp 97.2 °F (36.2 °C) (Temporal)   Resp 18   Ht 71\"   Wt 203 lb (92.1 kg)   SpO2 98%   BMI 28.31 kg/m²   Pre op diagnosis: Internal Hemorrhoids    Post op diagnosis: Same    Procedure: Anoscopy with O-ring Rubber Band Ligation of Internal Hemorrhoids    History of present illness:   This patient has internal hemorrhoids that are symptomatic.    Operative findings:   The internal hemorrhoid was successfully ligated in the standard fashion with an O-ring ligator.      Operative summary:  The patient was draped and exposed in the usual fashion.    A medical assistant was present at all times.    External visualization of the perineum and gluteal cleft was performed.    Digital exam was performed with a well lubricated examining  finger.    Diagnostic Anoscopy was performed with lubrication.    The anal canal was well visualized.    An internal hemorrhoid was identified and well visualized.    The internal hemorrhoid was grasped well above the dentate line with the grasper.    The internal hemorrhoid was pulled within the O-ring ligator.    The O-ring ligator was fired without complication.    A 5% phenol solution was injected into the hemorrhoid and at its base.    The patient tolerated the procedure and was observed in our office for at least 5 minutes prior to discharge.    All findings are listed in the physical exam section of this note.           Assessment   1. Prolapsed internal hemorrhoids, grade 4        Plan     Successful treatment of internal hemorrhoid by rubber band ligation and phenol injection.    The care plan is discussed with the patient who voiced understanding.    Dietary, activity, and exercise recommendations were discussed with the patient.    The patient is encouraged to avoid straining, continue dietary fiber supplementation, stool softeners, and adequate hydration.    Follow-up in 2 weeks for continued treatment of internal hemorrhoids.    The patient was provided ample opportunity to ask questions.  All of the patient's questions were answered in detail.  The patient voiced understanding of the care plan.       No orders of the defined types were placed in this encounter.      Imaging & Referrals   None    Follow Up  No follow-ups on file.    Herbert Skelton MD

## 2024-10-16 NOTE — PROGRESS NOTES
Follow Up Visit Note       Active Problems      No diagnosis found.      Chief Complaint   Chief Complaint   Patient presents with    Hemorrhoid Banding     2nd banding         History of Present Illness        Allergies  Uli is allergic to aspirin and nitroglycerin.    Past Medical / Surgical / Social / Family History    The past medical and past surgical history have been reviewed by me today.    Past Medical History:    Abdominal hernia    had a mesh inplanted    Atherosclerosis of coronary artery    CAD (coronary artery disease)    Calculus of kidney    Diabetes (HCC)    Disorder of thyroid    Essential hypertension    Hearing loss    High blood pressure    High cholesterol    HTN (hypertension)    Hyperlipidemia    Hypothyroid    Hypothyroidism    Kidney stones    S/P CABG x 4    Stented coronary artery    Visual impairment    glasses for night driving    Wears glasses    Night driving - distance     Past Surgical History:   Procedure Laterality Date    Cabg  2002    LAD, OM, RCA, PLVB-RCA    Colonoscopy  2008    Egd      Hernia surgery  2000    Other  2000    hernia left    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    Family History   Problem Relation Age of Onset    Asthma Father         Dead    Heart Attack Mother         death    Heart Disease Mother         Death  1978    Diabetes Neg      Social History     Socioeconomic History    Marital status:     Number of children: 4   Occupational History    Occupation: retired-computer    Tobacco Use    Smoking status: Never    Smokeless tobacco: Never   Vaping Use    Vaping status: Never Used   Substance and Sexual Activity    Alcohol use: No    Drug use: No   Other Topics Concern    Caffeine Concern No    Exercise No    Seat Belt No    Special Diet No    Stress Concern No    Weight Concern No         Current Outpatient Medications:     metoprolol succinate ER 50 MG Oral Tablet 24 Hr, Take 1 tablet (50 mg total) by mouth 2 (two) times daily., Disp: 180 tablet, Rfl: 0    clopidogrel 75 MG Oral Tab, Take 1 tablet (75 mg total) by mouth daily., Disp: 90 tablet, Rfl: 0    metFORMIN 850 MG Oral Tab, Take 1 tablet (850 mg total) by mouth 2 (two) times daily with meals., Disp: 180 tablet, Rfl: 0    senna-docusate 8.6-50 MG Oral Tab, Take 1 tablet by mouth daily., Disp: 30 tablet, Rfl: 0    atorvastatin 40 MG Oral Tab, Take 1 tablet (40 mg total) by mouth daily., Disp: 90 tablet, Rfl: 3    levothyroxine 125 MCG Oral Tab, Take 1 tablet (125 mcg total) by mouth before breakfast., Disp: 90 tablet, Rfl: 3    ONETOUCH VERIO In Vitro Strip, USE AS DIRECTED DAILY, Disp: 100 strip, Rfl: 0    ONETOUCH DELICA LANCETS 33G Does not apply Misc, USE AS DIRECTED DAILY, Disp: 100 each, Rfl: 3    aspirin 81 MG Oral Tab EC, Take 1 tablet (81 mg total) by mouth daily., Disp: 30 tablet, Rfl: 3     Review of Systems  The Review of Systems has been reviewed by me during today.  Review of Systems   Constitutional: Negative.    Respiratory: Negative.     Cardiovascular: Negative.    Gastrointestinal: Negative.    Skin: Negative.    Neurological: Negative.         Physical Findings   There were no vitals taken for this visit.  Physical Exam     Assessment   No diagnosis found.    Plan        No orders of the defined types were placed in this encounter.      Imaging & Referrals   None    Follow Up  No follow-ups on file.    Herbert Skelton MD

## 2024-11-20 ENCOUNTER — OFFICE VISIT (OUTPATIENT)
Facility: LOCATION | Age: 79
End: 2024-11-20
Payer: MEDICARE

## 2024-11-20 VITALS
DIASTOLIC BLOOD PRESSURE: 84 MMHG | HEIGHT: 70 IN | HEART RATE: 75 BPM | WEIGHT: 196 LBS | SYSTOLIC BLOOD PRESSURE: 136 MMHG | TEMPERATURE: 97 F | OXYGEN SATURATION: 94 % | BODY MASS INDEX: 28.06 KG/M2

## 2024-11-20 DIAGNOSIS — K62.5 RECTAL BLEEDING: Primary | ICD-10-CM

## 2024-11-20 PROCEDURE — 46600 DIAGNOSTIC ANOSCOPY SPX: CPT | Performed by: SURGERY

## 2024-11-20 PROCEDURE — 99212 OFFICE O/P EST SF 10 MIN: CPT | Performed by: SURGERY

## 2024-11-20 NOTE — PROGRESS NOTES
Follow Up Visit Note       Active Problems      1. Rectal bleeding          Chief Complaint   Chief Complaint   Patient presents with    [facility]     EP- Possible 3rd Hemorrhoid Banding,no symptoms.          History of Present Illness    The patient presents for follow-up after treatment of internal hemorrhoids.  The patient reports no symptoms, no bleeding, no perianal itching or other issues    The patient denies fever, chills, chest pain, shortness of breath, dyspnea. The patient also denies hematemesis, melena, or hematochezia. The patient denies change in bowel or bladder habits. There is no complaint of hematuria or dysuria.    Allergies  Uli is allergic to aspirin and nitroglycerin.    Past Medical / Surgical / Social / Family History    The past medical and past surgical history have been reviewed by me today.    Past Medical History:    Abdominal hernia    had a mesh inplanted    Atherosclerosis of coronary artery    CAD (coronary artery disease)    Calculus of kidney    Diabetes (HCC)    Disorder of thyroid    Essential hypertension    Hearing loss    High blood pressure    High cholesterol    HTN (hypertension)    Hyperlipidemia    Hypothyroid    Hypothyroidism    Kidney stones    S/P CABG x 4    Stented coronary artery    Visual impairment    glasses for night driving    Wears glasses    Night driving - distance     Past Surgical History:   Procedure Laterality Date    Cabg  2002    LAD, OM, RCA, PLVB-RCA    Colonoscopy  2008    Egd      Hernia surgery  2000    Other  2000    hernia left    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    Family History   Problem Relation Age of Onset    Asthma Father         Dead    Heart Attack Mother         death    Heart Disease Mother         Death  1978    Diabetes Neg      Social History     Socioeconomic  History    Marital status:     Number of children: 4   Occupational History    Occupation: retired-computer    Tobacco Use    Smoking status: Never    Smokeless tobacco: Never   Vaping Use    Vaping status: Never Used   Substance and Sexual Activity    Alcohol use: No    Drug use: No   Other Topics Concern    Caffeine Concern No    Exercise No    Seat Belt No    Special Diet No    Stress Concern No    Weight Concern No        Current Outpatient Medications:     metoprolol succinate ER 50 MG Oral Tablet 24 Hr, Take 1 tablet (50 mg total) by mouth 2 (two) times daily., Disp: 180 tablet, Rfl: 0    clopidogrel 75 MG Oral Tab, Take 1 tablet (75 mg total) by mouth daily., Disp: 90 tablet, Rfl: 0    metFORMIN 850 MG Oral Tab, Take 1 tablet (850 mg total) by mouth 2 (two) times daily with meals., Disp: 180 tablet, Rfl: 0    senna-docusate 8.6-50 MG Oral Tab, Take 1 tablet by mouth daily., Disp: 30 tablet, Rfl: 0    atorvastatin 40 MG Oral Tab, Take 1 tablet (40 mg total) by mouth daily., Disp: 90 tablet, Rfl: 3    levothyroxine 125 MCG Oral Tab, Take 1 tablet (125 mcg total) by mouth before breakfast., Disp: 90 tablet, Rfl: 3    ONETOUCH VERIO In Vitro Strip, USE AS DIRECTED DAILY, Disp: 100 strip, Rfl: 0    ONETOUCH DELICA LANCETS 33G Does not apply Misc, USE AS DIRECTED DAILY, Disp: 100 each, Rfl: 3    aspirin 81 MG Oral Tab EC, Take 1 tablet (81 mg total) by mouth daily., Disp: 30 tablet, Rfl: 3     Review of Systems  The Review of Systems has been reviewed by me during today.  Review of Systems   Constitutional:  Negative for chills, diaphoresis, fatigue, fever and unexpected weight change.   HENT:  Negative for hearing loss, nosebleeds, sore throat and trouble swallowing.    Respiratory:  Negative for apnea, cough, shortness of breath and wheezing.    Cardiovascular:  Negative for chest pain, palpitations and leg swelling.   Gastrointestinal:  Negative for abdominal distention, abdominal pain, anal bleeding,  blood in stool, constipation, diarrhea, nausea and vomiting.   Genitourinary:  Negative for difficulty urinating, dysuria, frequency and urgency.   Musculoskeletal:  Negative for arthralgias and myalgias.   Skin:  Negative for color change and rash.   Neurological:  Negative for tremors, syncope and weakness.   Hematological:  Negative for adenopathy. Does not bruise/bleed easily.   Psychiatric/Behavioral:  Negative for behavioral problems and sleep disturbance.         Physical Findings   /84 (BP Location: Left arm, Patient Position: Sitting, Cuff Size: adult)   Pulse 75   Temp 97.2 °F (36.2 °C) (Temporal)   Ht 70\"   Wt 196 lb (88.9 kg)   SpO2 94%   BMI 28.12 kg/m²   Physical Exam  Constitutional:       Appearance: He is well-developed.   HENT:      Head: Normocephalic and atraumatic.   Eyes:      General: No scleral icterus.  Neck:      Trachea: No tracheal deviation.   Genitourinary:     Prostate: Not enlarged and not tender.      Rectum: No mass, tenderness, anal fissure, external hemorrhoid or internal hemorrhoid. Normal anal tone.      Comments: No Prostate Nodule  Anal Sphincter Intact  No Pruritis Ani  No Lichenification  No Abscess  No Fistula in ano  No Anterior Fissure  No Posterior Fissure    See Procedures:  Anoscopy reveals all treated areas are healed.  No new lesions in the anorectal canal now.    Skin:     General: Skin is warm and dry.   Neurological:      Mental Status: He is alert and oriented to person, place, and time.   Psychiatric:         Speech: Speech normal.         Behavior: Behavior normal. Behavior is cooperative.         Thought Content: Thought content normal.         Judgment: Judgment normal.          Assessment   1. Rectal bleeding        Plan     The patient is recovering nicely following treatment of internal hemorrhoids by rubber band ligation.    The anticipated postoperative recovery was discussed with the patient in detail.    Dietary, activity, and exercise  recommendations along with restrictions were discussed with the patient during today's visit.    Wound care instructions were discussed during today's visit.    The patient will return to my attention on an as needed basis.    The patient is encouraged to continue seeing the primary care physician for ongoing medical needs.    The patient was given ample opportunity to ask questions.  The patient's questions were answered in detail and to the patient's satisfaction.  The patient voiced understanding of the postoperative care plan.         No orders of the defined types were placed in this encounter.      Imaging & Referrals   None    Follow Up  No follow-ups on file.    Herbert Skelton MD

## 2024-12-05 DIAGNOSIS — I25.10 CORONARY ARTERY DISEASE INVOLVING NATIVE CORONARY ARTERY OF NATIVE HEART WITHOUT ANGINA PECTORIS: ICD-10-CM

## 2024-12-05 DIAGNOSIS — E06.3 HYPOTHYROIDISM DUE TO HASHIMOTO'S THYROIDITIS: ICD-10-CM

## 2024-12-05 DIAGNOSIS — Z95.1 S/P CABG X 4: ICD-10-CM

## 2024-12-05 RX ORDER — ATORVASTATIN CALCIUM 40 MG/1
40 TABLET, FILM COATED ORAL DAILY
Qty: 90 TABLET | Refills: 0 | Status: SHIPPED | OUTPATIENT
Start: 2024-12-05

## 2024-12-05 RX ORDER — CLOPIDOGREL BISULFATE 75 MG/1
75 TABLET ORAL DAILY
Qty: 90 TABLET | Refills: 0 | Status: SHIPPED | OUTPATIENT
Start: 2024-12-05

## 2024-12-05 RX ORDER — LEVOTHYROXINE SODIUM 125 UG/1
125 TABLET ORAL
Qty: 90 TABLET | Refills: 0 | Status: SHIPPED | OUTPATIENT
Start: 2024-12-05

## 2024-12-05 RX ORDER — METOPROLOL SUCCINATE 50 MG/1
50 TABLET, EXTENDED RELEASE ORAL 2 TIMES DAILY
Qty: 180 TABLET | Refills: 0 | Status: SHIPPED | OUTPATIENT
Start: 2024-12-05

## 2024-12-05 NOTE — TELEPHONE ENCOUNTER
Atorvastatin 40 MG  Last time medication was refilled 11/09/2023  Last office visit  08/30/2024  Next office visit due/scheduled No future appointments.      Clopidogrel 75 MG  Last time medication was refilled 09/10/2024  Last office visit  08/30/2024  Next office visit due/scheduled No future appointments.    Levothyroxine 125 MCG  Last time medication was refilled 11/09/2023  Last office visit  08/30/2024  Next office visit due/scheduled No future appointments.    Metformin 850 MG   Last time medication was refilled 06/14/2024  Last office visit  08/30/2024  Next office visit due/scheduled No future appointments.    Metoprolol ER 50 MG  Last time medication was refilled 09/10/2024  Last office visit  08/30/2024  Next office visit due/scheduled No future appointments.    Medications failed protocol.

## 2025-03-03 NOTE — TELEPHONE ENCOUNTER
Last time medication was refilled 12/05/2024  Last office visit  08/30/2024  Next office visit due/scheduled No future appointments.    Medication failed protocol.    Patient due for annual,  to coordinate.

## 2025-03-17 ENCOUNTER — LAB ENCOUNTER (OUTPATIENT)
Dept: LAB | Age: 80
End: 2025-03-17
Attending: INTERNAL MEDICINE
Payer: MEDICARE

## 2025-03-17 ENCOUNTER — OFFICE VISIT (OUTPATIENT)
Dept: INTERNAL MEDICINE CLINIC | Facility: CLINIC | Age: 80
End: 2025-03-17
Payer: MEDICARE

## 2025-03-17 VITALS
DIASTOLIC BLOOD PRESSURE: 72 MMHG | WEIGHT: 203 LBS | OXYGEN SATURATION: 95 % | RESPIRATION RATE: 16 BRPM | SYSTOLIC BLOOD PRESSURE: 128 MMHG | HEIGHT: 70 IN | BODY MASS INDEX: 29.06 KG/M2 | TEMPERATURE: 99 F | HEART RATE: 81 BPM

## 2025-03-17 DIAGNOSIS — E11.65 TYPE 2 DIABETES MELLITUS WITH HYPERGLYCEMIA, WITHOUT LONG-TERM CURRENT USE OF INSULIN (HCC): ICD-10-CM

## 2025-03-17 DIAGNOSIS — E11.65 TYPE 2 DIABETES MELLITUS WITH HYPERGLYCEMIA, WITHOUT LONG-TERM CURRENT USE OF INSULIN (HCC): Primary | ICD-10-CM

## 2025-03-17 DIAGNOSIS — E11.69 HYPERLIPIDEMIA ASSOCIATED WITH TYPE 2 DIABETES MELLITUS (HCC): ICD-10-CM

## 2025-03-17 DIAGNOSIS — Z00.00 ENCOUNTER FOR ANNUAL HEALTH EXAMINATION: ICD-10-CM

## 2025-03-17 DIAGNOSIS — J98.01 BRONCHOSPASM: ICD-10-CM

## 2025-03-17 DIAGNOSIS — I15.2 HYPERTENSION ASSOCIATED WITH DIABETES (HCC): ICD-10-CM

## 2025-03-17 DIAGNOSIS — Z12.5 PROSTATE CANCER SCREENING: ICD-10-CM

## 2025-03-17 DIAGNOSIS — I25.10 CORONARY ARTERY DISEASE INVOLVING NATIVE CORONARY ARTERY OF NATIVE HEART WITHOUT ANGINA PECTORIS: ICD-10-CM

## 2025-03-17 DIAGNOSIS — Z00.00 ANNUAL PHYSICAL EXAM: Primary | ICD-10-CM

## 2025-03-17 DIAGNOSIS — E11.59 HYPERTENSION ASSOCIATED WITH DIABETES (HCC): ICD-10-CM

## 2025-03-17 DIAGNOSIS — E78.5 HYPERLIPIDEMIA ASSOCIATED WITH TYPE 2 DIABETES MELLITUS (HCC): ICD-10-CM

## 2025-03-17 DIAGNOSIS — E06.3 HYPOTHYROIDISM DUE TO HASHIMOTO'S THYROIDITIS: ICD-10-CM

## 2025-03-17 PROBLEM — D50.9 IRON DEFICIENCY ANEMIA, UNSPECIFIED: Status: RESOLVED | Noted: 2023-12-18 | Resolved: 2025-03-17

## 2025-03-17 PROBLEM — K64.8 INTERNAL HEMORRHOIDS WITH COMPLICATION: Status: RESOLVED | Noted: 2024-09-04 | Resolved: 2025-03-17

## 2025-03-17 PROBLEM — K62.5 RECTAL BLEEDING: Status: RESOLVED | Noted: 2023-12-18 | Resolved: 2025-03-17

## 2025-03-17 PROBLEM — B96.81 HELICOBACTER PYLORI GASTRITIS: Status: RESOLVED | Noted: 2023-12-18 | Resolved: 2025-03-17

## 2025-03-17 PROBLEM — K64.3 PROLAPSED INTERNAL HEMORRHOIDS, GRADE 4: Status: RESOLVED | Noted: 2024-01-10 | Resolved: 2025-03-17

## 2025-03-17 PROBLEM — D50.9 MICROCYTIC ANEMIA: Status: RESOLVED | Noted: 2023-11-07 | Resolved: 2025-03-17

## 2025-03-17 PROBLEM — K29.70 HELICOBACTER PYLORI GASTRITIS: Status: RESOLVED | Noted: 2023-12-18 | Resolved: 2025-03-17

## 2025-03-17 LAB
ALBUMIN SERPL-MCNC: 5.1 G/DL (ref 3.2–4.8)
ALBUMIN/GLOB SERPL: 2 {RATIO} (ref 1–2)
ALP LIVER SERPL-CCNC: 108 U/L
ALT SERPL-CCNC: 51 U/L
ANION GAP SERPL CALC-SCNC: 11 MMOL/L (ref 0–18)
AST SERPL-CCNC: 40 U/L (ref ?–34)
BASOPHILS # BLD AUTO: 0.02 X10(3) UL (ref 0–0.2)
BASOPHILS NFR BLD AUTO: 0.4 %
BILIRUB SERPL-MCNC: 0.8 MG/DL (ref 0.2–1.1)
BUN BLD-MCNC: 16 MG/DL (ref 9–23)
CALCIUM BLD-MCNC: 9.8 MG/DL (ref 8.7–10.6)
CHLORIDE SERPL-SCNC: 103 MMOL/L (ref 98–112)
CHOLEST SERPL-MCNC: 122 MG/DL (ref ?–200)
CO2 SERPL-SCNC: 26 MMOL/L (ref 21–32)
COMPLEXED PSA SERPL-MCNC: 0.29 NG/ML (ref ?–4)
CREAT BLD-MCNC: 1.27 MG/DL
CREAT UR-SCNC: 158 MG/DL
EGFRCR SERPLBLD CKD-EPI 2021: 58 ML/MIN/1.73M2 (ref 60–?)
EOSINOPHIL # BLD AUTO: 0.08 X10(3) UL (ref 0–0.7)
EOSINOPHIL NFR BLD AUTO: 1.6 %
ERYTHROCYTE [DISTWIDTH] IN BLOOD BY AUTOMATED COUNT: 20.5 %
EST. AVERAGE GLUCOSE BLD GHB EST-MCNC: 194 MG/DL (ref 68–126)
FASTING PATIENT LIPID ANSWER: YES
FASTING STATUS PATIENT QL REPORTED: YES
GLOBULIN PLAS-MCNC: 2.6 G/DL (ref 2–3.5)
GLUCOSE BLD-MCNC: 165 MG/DL (ref 70–99)
HBA1C MFR BLD: 8.4 % (ref ?–5.7)
HCT VFR BLD AUTO: 44.4 %
HDLC SERPL-MCNC: 28 MG/DL (ref 40–59)
HGB BLD-MCNC: 13.2 G/DL
IMM GRANULOCYTES # BLD AUTO: 0.02 X10(3) UL (ref 0–1)
IMM GRANULOCYTES NFR BLD: 0.4 %
LDLC SERPL CALC-MCNC: 61 MG/DL (ref ?–100)
LYMPHOCYTES # BLD AUTO: 0.79 X10(3) UL (ref 1–4)
LYMPHOCYTES NFR BLD AUTO: 16.1 %
MCH RBC QN AUTO: 23.1 PG (ref 26–34)
MCHC RBC AUTO-ENTMCNC: 29.7 G/DL (ref 31–37)
MCV RBC AUTO: 77.8 FL
MICROALBUMIN UR-MCNC: 3 MG/DL
MICROALBUMIN/CREAT 24H UR-RTO: 19 UG/MG (ref ?–30)
MONOCYTES # BLD AUTO: 0.64 X10(3) UL (ref 0.1–1)
MONOCYTES NFR BLD AUTO: 13.1 %
NEUTROPHILS # BLD AUTO: 3.35 X10 (3) UL (ref 1.5–7.7)
NEUTROPHILS # BLD AUTO: 3.35 X10(3) UL (ref 1.5–7.7)
NEUTROPHILS NFR BLD AUTO: 68.4 %
NONHDLC SERPL-MCNC: 94 MG/DL (ref ?–130)
OSMOLALITY SERPL CALC.SUM OF ELEC: 295 MOSM/KG (ref 275–295)
PLATELET # BLD AUTO: 216 10(3)UL (ref 150–450)
POTASSIUM SERPL-SCNC: 4.2 MMOL/L (ref 3.5–5.1)
PROT SERPL-MCNC: 7.7 G/DL (ref 5.7–8.2)
RBC # BLD AUTO: 5.71 X10(6)UL
SODIUM SERPL-SCNC: 140 MMOL/L (ref 136–145)
TRIGL SERPL-MCNC: 200 MG/DL (ref 30–149)
TSI SER-ACNC: 1.35 UIU/ML (ref 0.55–4.78)
VLDLC SERPL CALC-MCNC: 30 MG/DL (ref 0–30)
WBC # BLD AUTO: 4.9 X10(3) UL (ref 4–11)

## 2025-03-17 PROCEDURE — 82570 ASSAY OF URINE CREATININE: CPT

## 2025-03-17 PROCEDURE — 84443 ASSAY THYROID STIM HORMONE: CPT

## 2025-03-17 PROCEDURE — 80053 COMPREHEN METABOLIC PANEL: CPT

## 2025-03-17 PROCEDURE — 82043 UR ALBUMIN QUANTITATIVE: CPT

## 2025-03-17 PROCEDURE — 36415 COLL VENOUS BLD VENIPUNCTURE: CPT

## 2025-03-17 PROCEDURE — 83036 HEMOGLOBIN GLYCOSYLATED A1C: CPT

## 2025-03-17 PROCEDURE — 85025 COMPLETE CBC W/AUTO DIFF WBC: CPT

## 2025-03-17 PROCEDURE — 80061 LIPID PANEL: CPT

## 2025-03-17 RX ORDER — PREDNISONE 20 MG/1
TABLET ORAL
Qty: 10 TABLET | Refills: 0 | Status: SHIPPED | OUTPATIENT
Start: 2025-03-17

## 2025-03-17 RX ORDER — ALBUTEROL SULFATE 90 UG/1
2 INHALANT RESPIRATORY (INHALATION) EVERY 6 HOURS
Qty: 1 EACH | Refills: 0 | Status: SHIPPED | OUTPATIENT
Start: 2025-03-17

## 2025-03-17 RX ORDER — GLIMEPIRIDE 2 MG/1
2 TABLET ORAL
Qty: 90 TABLET | Refills: 0 | Status: SHIPPED | OUTPATIENT
Start: 2025-03-17

## 2025-03-17 NOTE — PROGRESS NOTES
Subjective:   Uli Vazquez is a 79 year old male who presents for a Medicare Wellness Visit charge within the last 11 months and Patient may not meet criteria for AWV: Please evaluate for correct coding and scheduled follow up of multiple significant but stable problems.     HPI  Pt reports normal state of health  No issues with meds  Denies hypoglycemia sxs    He does reports cough x 4-5 days. No fever. Cough in non productive and worse when he lies down. He thinks he has a viral infection about a week ago. No sob or leg edema    PAST MEDICAL, SOCIAL, FAMILY HISTORIES REVIEWED WITH PT    History/Other:   Fall Risk Assessment:   He has been screened for Falls and is low risk.      Cognitive Assessment:   He had a completely normal cognitive assessment - see flowsheet entries       Functional Ability/Status:   Uli Vazquez has a completely normal functional assessment. See flowsheet for details.      Depression Screening (PHQ):  PHQ-2 SCORE: 0  , done 3/17/2025            Advanced Directives:   He does have a Living Will but we do NOT have it on file in Epic.    He does have a POA but we do NOT have it on file in Epic.    Discussed Advance Care Planning with patient (and family/surrogate if present). Standard forms made available to patient in After Visit Summary.      Patient Active Problem List   Diagnosis    Coronary artery disease involving native coronary artery of native heart without angina pectoris    S/P CABG x 4    Hypothyroidism due to Hashimoto's thyroiditis    Type 2 diabetes mellitus with hyperglycemia, without long-term current use of insulin (HCC)    Hypertension associated with diabetes (HCC)    Hyperlipidemia associated with type 2 diabetes mellitus (HCC)    History of adenomatous polyp of colon     Allergies:  He is allergic to aspirin and nitroglycerin.    Current Medications:  Outpatient Medications Marked as Taking for the 3/17/25 encounter (Office Visit) with Geronimo Livingston  MD   Medication Sig    predniSONE 20 MG Oral Tab Take 2 tablets by mouth daily x 5 days    albuterol (PROAIR HFA) 108 (90 Base) MCG/ACT Inhalation Aero Soln Inhale 2 puffs into the lungs every 6 (six) hours.    METFORMIN 850 MG Oral Tab TAKE 1 TABLET (850 MG) BY MOUTH 2 TIMES A DAY WITH MEALS    LEVOTHYROXINE 125 MCG Oral Tab Take 1 tablet (125 mcg total) by mouth before breakfast.    ATORVASTATIN 40 MG Oral Tab TAKE ONE TABLET BY MOUTH ONCE DAILY    METOPROLOL SUCCINATE ER 50 MG Oral Tablet 24 Hr TAKE ONE TABLET BY MOUTH TWICE DAILY    CLOPIDOGREL 75 MG Oral Tab Take 1 tablet (75 mg total) by mouth daily.    ONETOUCH VERIO In Vitro Strip USE AS DIRECTED DAILY    ONETOUCH DELICA LANCETS 33G Does not apply Misc USE AS DIRECTED DAILY       Medical History:  He  has a past medical history of Abdominal hernia (10/06/2000), Atherosclerosis of coronary artery (06/01/2002), CAD (coronary artery disease), Calculus of kidney (09/01/2000), Diabetes (HCC), Disorder of thyroid, Essential hypertension (06/01/2002), Hearing loss (01/15/2005), High blood pressure, High cholesterol (2002), HTN (hypertension), Hyperlipidemia, Hypothyroid, Hypothyroidism (06/01/1992), Kidney stones, S/P CABG x 4 (2002), Stented coronary artery (06/20/2017), Visual impairment, and Wears glasses (06/01/2004).  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   Family History:  His family history includes Asthma in his father; Heart Attack in his mother; Heart Disease in his mother.  Social History:  He  reports that he has never smoked. He has never used smokeless tobacco. He reports that he does not drink alcohol and does not use drugs.    Tobacco:  He has never smoked tobacco.    CAGE Alcohol Screen:   CAGE screening score of 0 on 3/17/2025,  showing low risk of alcohol abuse.      Patient Care Team:  Geronimo Livingston MD as PCP - General (Internal Medicine)  Malini Cole APRN (Nurse Practitioner)  Doroteo Russo MD (CARDIOLOGY)  Kyleigh Flores APRN (Nurse Practitioner)  Tray Pinzon MD (CARDIOLOGY)    Review of Systems  A comprehensive 10 point review of systems was completed.     Pertinent positives and negatives noted in the HPI.      Objective:   Physical Exam  General: No acute distress. Alert and oriented x 3.  HEENT: Normocephalic atraumatic. Moist mucous membranes. EOM-I. PERRLA. Anicteric.  Neck: No lymphadenopathy.   Respiratory: Clear to auscultation bilaterally. No wheezes. No rhonchi.  Forced expiration causes cough and wheeze  Cardiovascular: S1, S2. Regular rate and rhythm. No murmurs, rubs or gallops. Equal pulses.   Abdomen: Soft,  no pain.  nontender, nondistended.  Positive bowel sounds. .  Extremities: No edema or cyanosis.  Bilateral barefoot skin diabetic exam is normal, visualized feet and the appearance is normal.  Bilateral monofilament/sensation of both feet is normal.  Pulsation pedal pulse exam of both lower legs/feet is normal as well.  Integument: No rashes or lesions.    Psychiatric: Appropriate mood and affect.    /72   Pulse 81   Temp 98.7 °F (37.1 °C)   Resp 16   Ht 5' 10\" (1.778 m)   Wt 203 lb (92.1 kg)   SpO2 95%   BMI 29.13 kg/m²  Estimated body mass index is 29.13 kg/m² as calculated from the following:    Height as of this encounter: 5' 10\" (1.778 m).    Weight as of this encounter: 203 lb (92.1 kg).    Medicare Hearing Assessment:   Hearing Screening    Screening Method: Whisper Test  Whisper Test Result: Pass               Assessment & Plan:   Uli Vazquez is a 79 year old male who presents for a Medicare Assessment.     1. Annual physical exam (Primary)  2. Coronary artery disease involving native coronary artery of native heart without angina pectoris  3. Hyperlipidemia  associated with type 2 diabetes mellitus (HCC)  4. Hypertension associated with diabetes (HCC)  5. Hypothyroidism due to Hashimoto's thyroiditis  6. Type 2 diabetes mellitus with hyperglycemia, without long-term current use of insulin (HCC)  -     CBC With Differential With Platelet; Future; Expected date: 03/17/2025  -     Comp Metabolic Panel (14); Future; Expected date: 03/17/2025  -     Hemoglobin A1C; Future; Expected date: 03/17/2025  -     Microalb/Creat Ratio, Random Urine; Future; Expected date: 03/17/2025  -     Lipid Panel; Future; Expected date: 03/17/2025  -     TSH W Reflex To Free T4; Future; Expected date: 03/17/2025  7. Prostate cancer screening  -     PSA Total, Screen; Future; Expected date: 03/17/2025  8. Bronchospasm  -     predniSONE; Take 2 tablets by mouth daily x 5 days  Dispense: 10 tablet; Refill: 0  -     Albuterol Sulfate HFA; Inhale 2 puffs into the lungs every 6 (six) hours.  Dispense: 1 each; Refill: 0  9. Encounter for annual health examination      Coronary artery disease involving native coronary artery of native -stable. continue control of cad risk factors  heart without angina pectoris    Hyperlipidemia associated with type 2 diabetes mellitus (HCC)-controlled. Cont current med therapy, cont atorvastatin 40 mg     Hypertension associated with diabetes (HCC)-controlled. Cont current med therapy    Hypothyroidism due to Hashimoto's thyroiditis-euthyroid. Cont replacement therapy    Type 2 diabetes mellitus with hyperglycemia, without long-term current use of insulin (HCC)- stable. Med titration if needed after lab results  -     Prostate cancer screening  -     PSA Total, Screen; Future; Expected date: 03/17/2025    Bronchospasm- post infectious  -     predniSONE; Take 2 tablets by mouth daily x 5 days  Dispense: 10 tablet; Refill: 0  -     Albuterol Sulfate HFA; Inhale 2 puffs into the lungs every 6 (six) hours.  Dispense: 1 each; Refill: 0    The patient indicates understanding  of these issues and agrees to the plan.  Continue with current treatment plan.  Lab work ordered.  Reinforced healthy diet, lifestyle, and exercise.      Return in about 6 months (around 9/17/2025).     Geronimo Livingston MD, 3/17/2025     Supplementary Documentation:   General Health:  In the past six months, have you lost more than 10 pounds without trying?: 2 - No  Has your appetite been poor?: No  Type of Diet: Balanced  How does the patient maintain a good energy level?: Daily Walks  How would you describe your daily physical activity?: Moderate  How would you describe your current health state?: Good  How do you maintain positive mental well-being?: Visiting Friends, Visiting Family  On a scale of 0 to 10, with 0 being no pain and 10 being severe pain, what is your pain level?: 0 - (None)  In the past six months, have you experienced urine leakage?: 0-No  At any time do you feel concerned for the safety/well-being of yourself and/or your children, in your home or elsewhere?: No  Have you had any immunizations at another office such as Influenza, Hepatitis B, Tetanus, or Pneumococcal?: No    Health Maintenance   Topic Date Due    Diabetes Care A1C  05/07/2024    COVID-19 Vaccine (8 - 2024-25 season) 09/01/2024    Diabetes Care: GFR  11/07/2024    Annual Physical  11/09/2024    Diabetes Care Dilated Eye Exam  11/29/2024    Annual Depression Screening  01/01/2025    Fall Risk Screening (Annual)  01/01/2025    Diabetes Care: Foot Exam (Annual)  01/01/2025    Diabetes Care: Microalb/Creat Ratio (Annual)  01/01/2025    Colorectal Cancer Screening  12/18/2033    Influenza Vaccine  Completed    Pneumococcal Vaccine: 50+ Years  Completed    Zoster Vaccines  Completed    Meningococcal B Vaccine  Aged Out

## 2025-03-22 DIAGNOSIS — I25.10 CORONARY ARTERY DISEASE INVOLVING NATIVE CORONARY ARTERY OF NATIVE HEART WITHOUT ANGINA PECTORIS: ICD-10-CM

## 2025-03-22 DIAGNOSIS — E06.3 HYPOTHYROIDISM DUE TO HASHIMOTO'S THYROIDITIS: ICD-10-CM

## 2025-03-22 DIAGNOSIS — Z95.1 S/P CABG X 4: ICD-10-CM

## 2025-03-22 RX ORDER — ATORVASTATIN CALCIUM 40 MG/1
40 TABLET, FILM COATED ORAL DAILY
Qty: 90 TABLET | Refills: 0 | Status: SHIPPED | OUTPATIENT
Start: 2025-03-22

## 2025-03-22 RX ORDER — LEVOTHYROXINE SODIUM 125 UG/1
125 TABLET ORAL
Qty: 90 TABLET | Refills: 0 | Status: SHIPPED | OUTPATIENT
Start: 2025-03-22

## 2025-03-22 RX ORDER — CLOPIDOGREL BISULFATE 75 MG/1
75 TABLET ORAL DAILY
Qty: 90 TABLET | Refills: 0 | Status: SHIPPED | OUTPATIENT
Start: 2025-03-22

## 2025-03-22 RX ORDER — LEVOTHYROXINE SODIUM 125 UG/1
125 TABLET ORAL
Qty: 90 TABLET | Refills: 0 | OUTPATIENT
Start: 2025-03-22

## 2025-03-22 RX ORDER — METOPROLOL SUCCINATE 50 MG/1
50 TABLET, EXTENDED RELEASE ORAL 2 TIMES DAILY
Qty: 180 TABLET | Refills: 0 | Status: SHIPPED | OUTPATIENT
Start: 2025-03-22

## 2025-03-22 RX ORDER — METOPROLOL SUCCINATE 50 MG/1
50 TABLET, EXTENDED RELEASE ORAL 2 TIMES DAILY
Qty: 180 TABLET | Refills: 0 | OUTPATIENT
Start: 2025-03-22

## 2025-03-22 RX ORDER — ATORVASTATIN CALCIUM 40 MG/1
40 TABLET, FILM COATED ORAL DAILY
Qty: 90 TABLET | Refills: 0 | OUTPATIENT
Start: 2025-03-22

## 2025-03-22 RX ORDER — CLOPIDOGREL BISULFATE 75 MG/1
75 TABLET ORAL DAILY
Qty: 90 TABLET | Refills: 0 | OUTPATIENT
Start: 2025-03-22

## 2025-03-22 NOTE — TELEPHONE ENCOUNTER
Atorvastatin 40 MG  Last time medication was refilled 12/05/2024  Last office visit  03/17/2025  Next office visit due/scheduled   Future Appointments   Date Time Provider Department Center   9/2/2025 11:00 AM Geronimo Livingston MD [facility]     Passed protocol, Medication sent.        Clopidogrel 75 MG  Last time medication was refilled 12/05/2024  Last office visit  03/17/2025  Next office visit due/scheduled   Future Appointments   Date Time Provider Department Center   9/2/2025 11:00 AM Geronimo Livingston MD [facility]     Medication not on protocol.            Levothyroxine 125 MCG  Last time medication was refilled 12/05/2024  Last office visit  03/17/2025  Next office visit due/scheduled   Future Appointments   Date Time Provider Department Center   9/2/2025 11:00 AM Geronimo Livingston MD [facility]     Passed protocol, Medication sent.            Metoprolol 50 MG   Last time medication was refilled 12/05/2024  Last office visit  03/17/2025  Next office visit due/scheduled   Future Appointments   Date Time Provider Department Center   9/2/2025 11:00 AM Geronimo Livingston MD [facility]     Passed protocol, Medication sent.

## 2025-04-01 DIAGNOSIS — J98.01 BRONCHOSPASM: ICD-10-CM

## 2025-04-01 RX ORDER — ALBUTEROL SULFATE 90 UG/1
2 INHALANT RESPIRATORY (INHALATION) EVERY 6 HOURS
Qty: 1 EACH | Refills: 0 | Status: SHIPPED | OUTPATIENT
Start: 2025-04-01

## 2025-04-01 RX ORDER — PREDNISONE 20 MG/1
TABLET ORAL
Qty: 10 TABLET | Refills: 0 | Status: SHIPPED | OUTPATIENT
Start: 2025-04-01 | End: 2025-04-10 | Stop reason: ALTCHOICE

## 2025-04-01 NOTE — TELEPHONE ENCOUNTER
Per Petra ARRIOLA  if his symptoms have not improved or worsen, rec chest xray. If symptoms have improved but not resolved then ok to refill    Spoke with Patient, stated his symptoms are much better but has some cough at night, requesting another 5 days treatment to completely recover since he has planned vacation coming up  Rx sent for 5 days treatment

## 2025-04-09 ENCOUNTER — TELEPHONE (OUTPATIENT)
Dept: INTERNAL MEDICINE CLINIC | Facility: CLINIC | Age: 80
End: 2025-04-09

## 2025-04-09 ENCOUNTER — HOSPITAL ENCOUNTER (OUTPATIENT)
Dept: GENERAL RADIOLOGY | Age: 80
Discharge: HOME | End: 2025-04-09
Attending: INTERNAL MEDICINE
Payer: MEDICARE

## 2025-04-09 DIAGNOSIS — R05.1 ACUTE COUGH: ICD-10-CM

## 2025-04-09 DIAGNOSIS — R05.1 ACUTE COUGH: Primary | ICD-10-CM

## 2025-04-09 PROCEDURE — 71046 X-RAY EXAM CHEST 2 VIEWS: CPT | Performed by: INTERNAL MEDICINE

## 2025-04-09 NOTE — TELEPHONE ENCOUNTER
Spoke with Patient, stated his cough is persisting, he is on the second round of steroids, upcoming appointment with Dr. Livingston tomorrow  Advised to complete chest xray and discuss with Dr. Livingston tomorrow during the appointment for course of action for ingoing cough  Mean while take over the counter cough medication and and use inhaler  Patient voiced understanding  Chest xray ordered, call transferred to CS

## 2025-04-09 NOTE — TELEPHONE ENCOUNTER
Patient called the office stating that his cough has been persisting since he finished his predniSONE 20 MG Oral Tab.     Patient is requesting more medications, he also mentioned that he may need a chest xray.     [id number] is preferred pharmacy.     Please call patient back regarding next steps.

## 2025-04-10 ENCOUNTER — TELEPHONE (OUTPATIENT)
Dept: INTERNAL MEDICINE CLINIC | Facility: CLINIC | Age: 80
End: 2025-04-10

## 2025-04-10 ENCOUNTER — OFFICE VISIT (OUTPATIENT)
Dept: INTERNAL MEDICINE CLINIC | Facility: CLINIC | Age: 80
End: 2025-04-10
Payer: MEDICARE

## 2025-04-10 VITALS
HEART RATE: 78 BPM | HEIGHT: 70 IN | OXYGEN SATURATION: 98 % | WEIGHT: 203 LBS | TEMPERATURE: 98 F | RESPIRATION RATE: 18 BRPM | DIASTOLIC BLOOD PRESSURE: 72 MMHG | BODY MASS INDEX: 29.06 KG/M2 | SYSTOLIC BLOOD PRESSURE: 122 MMHG

## 2025-04-10 DIAGNOSIS — J98.01 POST-INFECTION BRONCHOSPASM: Primary | ICD-10-CM

## 2025-04-10 DIAGNOSIS — R05.1 ACUTE COUGH: ICD-10-CM

## 2025-04-10 DIAGNOSIS — J98.01 BRONCHOSPASM: Primary | ICD-10-CM

## 2025-04-10 PROCEDURE — 99213 OFFICE O/P EST LOW 20 MIN: CPT | Performed by: INTERNAL MEDICINE

## 2025-04-10 RX ORDER — FLUTICASONE PROPIONATE AND SALMETEROL 250; 50 UG/1; UG/1
1 POWDER RESPIRATORY (INHALATION) 2 TIMES DAILY
Qty: 1 EACH | Refills: 0 | Status: SHIPPED | OUTPATIENT
Start: 2025-04-10 | End: 2025-04-11

## 2025-04-10 RX ORDER — BUDESONIDE AND FORMOTEROL FUMARATE DIHYDRATE 160; 4.5 UG/1; UG/1
2 AEROSOL RESPIRATORY (INHALATION) 2 TIMES DAILY
Qty: 1 EACH | Refills: 0 | Status: SHIPPED | OUTPATIENT
Start: 2025-04-10 | End: 2025-04-10

## 2025-04-10 NOTE — TELEPHONE ENCOUNTER
Patient called stating he was informed by [facility] that insurance will not cover Symbicort.     Fax was also received at the same time stating that PA is needed.     Placed in Blue folder.    Please call patient back with update.

## 2025-04-10 NOTE — PROGRESS NOTES
Subjective:   Patient ID: Uli Vazquez is a 79 year old male.    Cough  This is a recurrent problem. The current episode started more than 1 month ago. The problem has been unchanged. The problem occurs every few minutes. The cough is Non-productive. Pertinent negatives include no ear congestion, ear pain, nasal congestion, postnasal drip, sore throat, shortness of breath or sweats. The symptoms are aggravated by lying down. He has tried a beta-agonist inhaler and oral steroids for the symptoms. The treatment provided no relief.       History/Other:   Review of Systems   HENT:  Negative for ear pain, postnasal drip and sore throat.    Respiratory:  Positive for cough. Negative for shortness of breath.    All other systems reviewed and are negative.    Current Medications[1]  Allergies:Allergies[2]    Objective:   Physical Exam  Vitals and nursing note reviewed.   Constitutional:       Appearance: Normal appearance.   Cardiovascular:      Rate and Rhythm: Normal rate and regular rhythm.      Heart sounds: Normal heart sounds.   Pulmonary:      Effort: Pulmonary effort is normal.      Breath sounds: Normal breath sounds.      Comments: Forced expiration causes cough and wheeze    Musculoskeletal:      Left lower leg: No edema.   Neurological:      Mental Status: He is alert.         Assessment & Plan:   1. Post-infection bronchospasm    - symbicort 160 mg bid x 2weeks    No orders of the defined types were placed in this encounter.      Meds This Visit:  Requested Prescriptions      No prescriptions requested or ordered in this encounter       Imaging & Referrals:  None         [1]   Current Outpatient Medications   Medication Sig Dispense Refill    albuterol (PROAIR HFA) 108 (90 Base) MCG/ACT Inhalation Aero Soln Inhale 2 puffs into the lungs every 6 (six) hours. 1 each 0    METOPROLOL SUCCINATE ER 50 MG Oral Tablet 24 Hr TAKE ONE TABLET BY MOUTH TWICE DAILY 180 tablet 0    ATORVASTATIN 40 MG Oral Tab TAKE  ONE TABLET BY MOUTH ONCE DAILY 90 tablet 0    LEVOTHYROXINE 125 MCG Oral Tab Take 1 tablet by mouth before breakfast. 90 tablet 0    CLOPIDOGREL 75 MG Oral Tab TAKE ONE TABLET BY MOUTH ONCE DAILY 90 tablet 0    glimepiride 2 MG Oral Tab Take 1 tablet (2 mg total) by mouth every morning before breakfast. 90 tablet 0    METFORMIN 850 MG Oral Tab TAKE 1 TABLET (850 MG) BY MOUTH 2 TIMES A DAY WITH MEALS 180 tablet 0    ONETOUCH VERIO In Vitro Strip USE AS DIRECTED DAILY 100 strip 0    ONETOUCH DELICA LANCETS 33G Does not apply Misc USE AS DIRECTED DAILY 100 each 3    fluticasone-salmeterol 250-50 MCG/ACT Inhalation Aerosol Powder, Breath Activated Inhale 1 puff into the lungs 2 (two) times daily for 14 days. 1 each 0   [2]   Allergies  Allergen Reactions    Aspirin TINITUS    Nitroglycerin OTHER (SEE COMMENTS)     Rapid heart rate

## 2025-04-10 NOTE — PROGRESS NOTES
Written by Geronimo Livingston MD on 4/9/2025  1:56 PM CDT  Seen by patient Uli Vazquez on 4/9/2025  3:38 PM

## 2025-04-10 NOTE — TELEPHONE ENCOUNTER
Spoke with pharmacy, insurance does not cover Advair, Dulera, Symbicort  Per Dr. Livingston send Advair Diskus 250-50, and have Patient use coupon to lower the out of pocket cost  Rx sent   Patient notified, voiced understanding

## 2025-04-11 ENCOUNTER — TELEPHONE (OUTPATIENT)
Dept: INTERNAL MEDICINE CLINIC | Facility: CLINIC | Age: 80
End: 2025-04-11

## 2025-04-11 DIAGNOSIS — J98.01 BRONCHOSPASM: ICD-10-CM

## 2025-04-11 DIAGNOSIS — T16.9XXA FOREIGN BODY IN EAR, UNSPECIFIED LATERALITY, INITIAL ENCOUNTER: Primary | ICD-10-CM

## 2025-04-11 DIAGNOSIS — R05.1 ACUTE COUGH: ICD-10-CM

## 2025-04-11 RX ORDER — FLUTICASONE PROPIONATE AND SALMETEROL 250; 50 UG/1; UG/1
1 POWDER RESPIRATORY (INHALATION) 2 TIMES DAILY
Qty: 1 EACH | Refills: 0 | Status: SHIPPED | OUTPATIENT
Start: 2025-04-11 | End: 2025-04-25

## 2025-04-11 NOTE — TELEPHONE ENCOUNTER
Fax received requesting PA for fluticasone-salmeterol 250-50 MCG/ACT Inhalation Aerosol Powder, Breath Activated     Given to triage.

## 2025-04-11 NOTE — TELEPHONE ENCOUNTER
Patient seen by audiologist and was informed he has a plastic disc in his ear canal that needs removed. ENT referral placed and information sent to patient in [facility] per request.    Patient also following up on call from yesterday regarding maintenance inhaler. Per patient GoodRx coupon still only brings inhaler down to $100. Advised patient does not appear office was given a list of covered alternatives only inhalers not covered. Patient has not yet picked up Albuterol inhaler. Advised patient to  Albuterol inhaler and start this for cough control. Script for Advair diskus sent back to pharmacy with note OK to fill as brand or generic whichever insurance prefers.

## 2025-04-15 ENCOUNTER — OFFICE VISIT (OUTPATIENT)
Facility: LOCATION | Age: 80
End: 2025-04-15
Payer: MEDICARE

## 2025-04-15 DIAGNOSIS — T16.2XXA FOREIGN BODY IN AURICLE OF LEFT EAR, INITIAL ENCOUNTER: Primary | ICD-10-CM

## 2025-04-15 PROCEDURE — 69200 CLEAR OUTER EAR CANAL: CPT | Performed by: OTOLARYNGOLOGY

## 2025-04-15 NOTE — PROGRESS NOTES
Uli Vazquez is a 79 year old male.   Chief Complaint   Patient presents with    FB in Ear     HPI:   He has a history of foreign body in the left ear noticed by his audiologist.  There is no pain.  Current Medications[1]   Past Medical History[2]   Social History:  Short Social Hx on File[3]   Past Surgical History[4]      REVIEW OF SYSTEMS:   GENERAL HEALTH: feels well otherwise  GENERAL : denies fever, chills, sweats, weight loss, weight gain  SKIN: denies any unusual skin lesions or rashes  RESPIRATORY: denies shortness of breath with exertion  NEURO: denies headaches    EXAM:   There were no vitals taken for this visit.    System Findings Details   Constitutional  Overall appearance - Normal.   Psychiatric  Orientation - Oriented to time, place, person & situation. Appropriate mood and affect.   Head/Face  Facial features -- Normal. Skull - Normal.   Eyes  Pupils equal ,round ,react to light and accomidate   Ears, Nose, Throat, Neck  Both ear seen under the microscope.  Right ear is clear the left ear shows a dome from the hearing aid in the ear canal removed today with alligator forceps without difficulty.  Otherwise ears clear   Neurological  Memory - Normal. Cranial nerves - Cranial nerves II through XII grossly intact.   Lymph Detail  Submental. Submandibular. Anterior cervical. Posterior cervical. Supraclavicular.       ASSESSMENT AND PLAN:   1. 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      The patient indicates understanding of these issues and agrees to the plan.    No follow-ups on file.    Diego Knight MD  4/15/2025  10:16 AM       [1]   Current Outpatient Medications   Medication Sig Dispense Refill    fluticasone-salmeterol 250-50 MCG/ACT Inhalation Aerosol Powder, Breath Activated Inhale 1 puff into the lungs 2 (two) times daily for 14 days. 1 each 0    albuterol (PROAIR HFA) 108 (90 Base) MCG/ACT Inhalation Aero Soln Inhale  2 puffs into the lungs every 6 (six) hours. 1 each 0    METOPROLOL SUCCINATE ER 50 MG Oral Tablet 24 Hr TAKE ONE TABLET BY MOUTH TWICE DAILY 180 tablet 0    ATORVASTATIN 40 MG Oral Tab TAKE ONE TABLET BY MOUTH ONCE DAILY 90 tablet 0    LEVOTHYROXINE 125 MCG Oral Tab Take 1 tablet by mouth before breakfast. 90 tablet 0    CLOPIDOGREL 75 MG Oral Tab TAKE ONE TABLET BY MOUTH ONCE DAILY 90 tablet 0    glimepiride 2 MG Oral Tab Take 1 tablet (2 mg total) by mouth every morning before breakfast. 90 tablet 0    METFORMIN 850 MG Oral Tab TAKE 1 TABLET (850 MG) BY MOUTH 2 TIMES A DAY WITH MEALS 180 tablet 0    ONETOUCH VERIO In Vitro Strip USE AS DIRECTED DAILY 100 strip 0    ONETOUCH DELICA LANCETS 33G Does not apply Misc USE AS DIRECTED DAILY 100 each 3   [2]   Past Medical History:   Abdominal hernia    had a mesh inplanted    Atherosclerosis of coronary artery    CAD (coronary artery disease)    Calculus of kidney    Diabetes (HCC)    Disorder of thyroid    Essential hypertension    Hearing loss    High blood pressure    High cholesterol    HTN (hypertension)    Hyperlipidemia    Hypothyroid    Hypothyroidism    Kidney stones    S/P CABG x 4    Stented coronary artery    Visual impairment    glasses for night driving    Wears glasses    Night driving - distance   [3]   Social History  Socioeconomic History    Marital status:     Number of children: 4   Occupational History    Occupation: retired-computer    Tobacco Use    Smoking status: Never    Smokeless tobacco: Never   Vaping Use    Vaping status: Never Used   Substance and Sexual Activity    Alcohol use: No    Drug use: No   Other Topics Concern    Caffeine Concern No    Exercise No    Seat Belt No    Special Diet No    Stress Concern No    Weight Concern No   Social History Narrative    3 stepchildren , 1 biologic child.    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

## 2025-04-17 ENCOUNTER — TELEPHONE (OUTPATIENT)
Dept: INTERNAL MEDICINE CLINIC | Facility: CLINIC | Age: 80
End: 2025-04-17

## 2025-04-17 ENCOUNTER — HOSPITAL ENCOUNTER (OUTPATIENT)
Dept: CT IMAGING | Facility: HOSPITAL | Age: 80
Discharge: HOME | End: 2025-04-17
Attending: INTERNAL MEDICINE
Payer: MEDICARE

## 2025-04-17 DIAGNOSIS — J98.01 BRONCHOSPASM: ICD-10-CM

## 2025-04-17 DIAGNOSIS — R05.1 ACUTE COUGH: ICD-10-CM

## 2025-04-17 DIAGNOSIS — J98.01 BRONCHOSPASM: Primary | ICD-10-CM

## 2025-04-17 PROBLEM — I72.9 ANEURYSMAL DILATATION: Status: ACTIVE | Noted: 2025-04-17

## 2025-04-17 PROCEDURE — 71250 CT THORAX DX C-: CPT | Performed by: INTERNAL MEDICINE

## 2025-04-17 RX ORDER — PREDNISONE 20 MG/1
40 TABLET ORAL DAILY
Qty: 10 TABLET | Refills: 0 | Status: SHIPPED | OUTPATIENT
Start: 2025-04-17 | End: 2025-04-22

## 2025-04-17 NOTE — TELEPHONE ENCOUNTER
Discussed with Dr. Livingston. Order CT Chest. May refill Prednisone 40 mg x 5 days. Patient advised. He has remaining doses of his Albuterol inhaler and will continue that as directed as well.

## 2025-04-17 NOTE — TELEPHONE ENCOUNTER
Patient states that he still coughing - and dr sosa ask him to call back to get orders for a CT scan and refill of the meds.

## 2025-05-11 DIAGNOSIS — J98.01 BRONCHOSPASM: ICD-10-CM

## 2025-05-11 DIAGNOSIS — R05.1 ACUTE COUGH: ICD-10-CM

## 2025-05-11 RX ORDER — FLUTICASONE PROPIONATE AND SALMETEROL 250; 50 UG/1; UG/1
POWDER RESPIRATORY (INHALATION)
Qty: 60 EACH | Refills: 0 | Status: SHIPPED | OUTPATIENT
Start: 2025-05-11

## 2025-05-11 NOTE — TELEPHONE ENCOUNTER
Last time medication was refilled:   Next office visit due/scheduled: 9/2/25  Last office visit: 4/10/25  Last Labs: 3/17/25

## 2025-06-02 NOTE — TELEPHONE ENCOUNTER
GC/Chlamydia  ----- Message from Javy Cruz MD sent at 7/31/2019 10:55 AM CDT -----  negnp     Last time medication was refilled 03/03/2025  Last office visit  04/10/2025  Next office visit due/scheduled   Future Appointments   Date Time Provider Department Center   9/2/2025 11:00 AM Geronimo Livingston MD [facility]     Medication failed protocol.

## 2025-06-13 DIAGNOSIS — I25.10 CORONARY ARTERY DISEASE INVOLVING NATIVE CORONARY ARTERY OF NATIVE HEART WITHOUT ANGINA PECTORIS: ICD-10-CM

## 2025-06-13 DIAGNOSIS — E06.3 HYPOTHYROIDISM DUE TO HASHIMOTO'S THYROIDITIS: ICD-10-CM

## 2025-06-13 DIAGNOSIS — Z95.1 S/P CABG X 4: ICD-10-CM

## 2025-06-13 DIAGNOSIS — E11.65 TYPE 2 DIABETES MELLITUS WITH HYPERGLYCEMIA, WITHOUT LONG-TERM CURRENT USE OF INSULIN (HCC): ICD-10-CM

## 2025-06-13 RX ORDER — METOPROLOL SUCCINATE 50 MG/1
50 TABLET, EXTENDED RELEASE ORAL 2 TIMES DAILY
Qty: 180 TABLET | Refills: 0 | Status: SHIPPED | OUTPATIENT
Start: 2025-06-13

## 2025-06-13 RX ORDER — GLIMEPIRIDE 2 MG/1
2 TABLET ORAL
Qty: 90 TABLET | Refills: 0 | Status: SHIPPED | OUTPATIENT
Start: 2025-06-13

## 2025-06-13 RX ORDER — CLOPIDOGREL BISULFATE 75 MG/1
75 TABLET ORAL DAILY
Qty: 90 TABLET | Refills: 0 | Status: SHIPPED | OUTPATIENT
Start: 2025-06-13

## 2025-06-13 RX ORDER — ATORVASTATIN CALCIUM 40 MG/1
40 TABLET, FILM COATED ORAL DAILY
Qty: 90 TABLET | Refills: 0 | Status: SHIPPED | OUTPATIENT
Start: 2025-06-13

## 2025-06-13 RX ORDER — LEVOTHYROXINE SODIUM 125 UG/1
125 TABLET ORAL
Qty: 90 TABLET | Refills: 0 | Status: SHIPPED | OUTPATIENT
Start: 2025-06-13

## 2025-06-13 NOTE — TELEPHONE ENCOUNTER
Last time medication was refilled 03/17/2025  Last office visit  04/10/2025  Next office visit due/scheduled   Future Appointments   Date Time Provider Department Center   9/2/2025 11:00 AM Geronimo Livingston MD [facility]                 Medication failed protocol.

## 2025-06-13 NOTE — TELEPHONE ENCOUNTER
Metoprolol  Last time medication was refilled 3/22/25  Last office visit  4/10/25  Next office visit due/scheduled   Future Appointments   Date Time Provider Department Center   9/2/2025 11:00 AM Geronimo Livingston MD [facility]   Passed protocol, Medication sent.      Atorvastatin  Last time medication was refilled 3/22/25  Last office visit  4/10/25  Next office visit due/scheduled   Future Appointments   Date Time Provider Department Center   9/2/2025 11:00 AM Geronimo Livingston MD [facility]   Passed protocol, Medication sent.      Levothyroxine  Last time medication was refilled 3/22/25  Last office visit  4/10/25  Next office visit due/scheduled   Future Appointments   Date Time Provider Department Center   9/2/2025 11:00 AM Geronimo Livingston MD [facility]   Passed protocol, Medication sent.    Clopridogrel  Last time medication was refilled 3/22/25  Last office visit  4/10/25  Next office visit due/scheduled   Future Appointments   Date Time Provider Department Center   9/2/2025 11:00 AM Geronimo Livingston MD [facility]   Medication not on protocol.

## 2025-09-01 PROBLEM — E11.8 DIABETES MELLITUS WITH COMPLICATION (HCC): Status: ACTIVE | Noted: 2025-09-01

## 2025-09-01 PROBLEM — E11.65 TYPE 2 DIABETES MELLITUS WITH HYPERGLYCEMIA, WITHOUT LONG-TERM CURRENT USE OF INSULIN (HCC): Status: RESOLVED | Noted: 2019-09-26 | Resolved: 2025-09-01

## (undated) DIAGNOSIS — E06.3 HYPOTHYROIDISM DUE TO HASHIMOTO'S THYROIDITIS: ICD-10-CM

## (undated) DIAGNOSIS — Z95.1 S/P CABG X 4: ICD-10-CM

## (undated) DIAGNOSIS — I10 ESSENTIAL HYPERTENSION: ICD-10-CM

## (undated) DIAGNOSIS — E11.9 TYPE 2 DIABETES MELLITUS WITHOUT COMPLICATION, WITHOUT LONG-TERM CURRENT USE OF INSULIN (HCC): ICD-10-CM

## (undated) DIAGNOSIS — E78.2 MIXED HYPERLIPIDEMIA: ICD-10-CM

## (undated) DIAGNOSIS — I25.10 CORONARY ARTERY DISEASE INVOLVING NATIVE CORONARY ARTERY OF NATIVE HEART WITHOUT ANGINA PECTORIS: ICD-10-CM

## (undated) DIAGNOSIS — E03.8 HYPOTHYROIDISM DUE TO HASHIMOTO'S THYROIDITIS: ICD-10-CM

## (undated) DIAGNOSIS — E11.9 TYPE 2 DIABETES MELLITUS WITHOUT COMPLICATION, WITHOUT LONG-TERM CURRENT USE OF INSULIN (HCC): Primary | ICD-10-CM

## (undated) DIAGNOSIS — E03.9 HYPOTHYROIDISM, UNSPECIFIED TYPE: ICD-10-CM

## (undated) DEVICE — PREMIUM WET SKIN PREP TRAY: Brand: MEDLINE INDUSTRIES, INC.

## (undated) DEVICE — SUTURE ETHLN SZ 2-0 L18IN NONABSORB BLK

## (undated) DEVICE — STERILE POLYISOPRENE POWDER-FREE SURGICAL GLOVES: Brand: PROTEXIS

## (undated) DEVICE — SLEEVE COMPR M KNEE LEN SGL USE KENDALL SCD

## (undated) DEVICE — GYN CDS: Brand: MEDLINE INDUSTRIES, INC.

## (undated) DEVICE — SUTURE PROL SZ 1 L30IN NONABSORBABLE BLU

## (undated) DEVICE — VIOLET BRAIDED (POLYGLACTIN 910), SYNTHETIC ABSORBABLE SUTURE: Brand: COATED VICRYL

## (undated) DEVICE — PENCIL TELESCOPE MEGADYNE SE

## (undated) DEVICE — 33MM PROXIMATE PPH, PROCEDURE FOR PROLAPSE AND HEMORRHOIDS SET: Brand: PROXIMATE

## (undated) DEVICE — SOLUTION IRRIG 1000ML 0.9% NACL USP BTL

## (undated) DEVICE — #15 STERILE STAINLESS BLADE: Brand: STERILE STAINLESS BLADES

## (undated) DEVICE — SKN PREP SPNG STKS PVP PNT STR: Brand: MEDLINE INDUSTRIES, INC.

## (undated) NOTE — LETTER
[facility], [facility] [place]   [facility] [address] [facility]  [phone]          10/02/20      Ne Driver  :  1945      Dr. Yani Wilson,    The above patient is currently under my medical care.   H

## (undated) NOTE — MR AVS SNAPSHOT
[facility] [id number] [facility]  [place] [address]  [phone]               Thank you for choosing us for your health care visit with Fidel Tate PA-C. We are glad to serve you and happy to provide you with this summary of your visit.   Sophy Take 1 tablet (40 mg total) by mouth daily. Commonly known as:  LIPITOR           Clopidogrel Bisulfate 75 MG Tabs   Take 1 tablet (75 mg total) by mouth daily.    Commonly known as:  PLAVIX           gemfibrozil 600 MG Tabs   Take 1 tablet (600 mg total) Support Staff. Remember, MyChart is NOT to be used for urgent needs. For medical emergencies, dial 911.            Visit [facility] online at  [email or web address]

## (undated) NOTE — LETTER
21    Patient: Ashish Velasquez  : 1945 Visit date: 2021    Dear  Chet Pimentel MD    Thank you for referring Ashish Velasquez to my practice. Please find my assessment and plan below.         Assessment   Prolapsed internal hemorrhoids

## (undated) NOTE — LETTER
12/10/20    Patient: Michael Shields  : 1945 Visit date: 12/10/2020    Dear  Eldon Delgadillo MD    Thank you for referring Michael Shields to my practice. Please find my assessment and plan below.         Assessment   Prolapsed internal hemorrhoid

## (undated) NOTE — LETTER
Date: 5/18/2022    Patient Name: Paola Heimlich          To Whom it may concern: This letter has been written to advise that our patient, Mulugeta Christopher, was seen at the [facility] today, 5/18/2022, for treatment of Covid-19.   He has tested positive and cannot travel due to this positive test.        Sincerely,    ALMA ROSA Fletcher

## (undated) NOTE — LETTER
05/18/22    Patient: Jake Vazquez    YOB: 1945    To Whom it may Concern: The above patient is currently under my medical care. Patient has tested positive for Covid on 5/18/22. Patient is under [employer] currently per CDC guidelines and unable to travel.             Sincerely,          ALMA ROSA Swain

## (undated) NOTE — LETTER
21    Patient: Madhavi العلي  : 1945 Visit date: 2021    Dear  Lillian Cuenca MD    Thank you for referring Madhavi العلي to my practice. Please find my assessment and plan below.     Assessment   Prolapsed internal hemorrhoids, gra

## (undated) NOTE — LETTER
24    Patient: Uli Vazquez  : 1945 Visit date: 2024    Dear  Geronimo Livingston MD    Thank you for referring Uli Vazquez to my practice.  Please find my assessment and plan below.    Assessment   1. Rectal bleeding    2. Internal hemorrhoids with complication        Plan     The patient has what appear to be 2 new prominent internal hemorrhoids.    The patient will be scheduled for in -office rubber band ligation of 2  new internal hemorrhoids.    The suzie-operative care plan was discussed with the patient, who voices understanding.  Activity and lifting recommendations were discussed in length.     The risks, benefits, and alternatives to the procedure were explained to the patient.  The risks explained include, but are not limited to, bleeding, infection, pain wound complications, recurrence, incorrect diagnosis, injury to adjacent organs and structures. We also discussed the possibile need for further therapeutic, diagnostic, or surgical intervention.  The patient voiced understanding, and after all questions were answered to the patient's satisfaction, the patient provided willing and informed consent to proceed.       Sincerely,       Herbert Skelton MD   CC:   No Recipients

## (undated) NOTE — LETTER
20    Patient: Alvaro Yaptist  : 1945 Visit date: 12/3/2020    Dear  Jocelyne Jones MD    Thank you for referring Alvaro Ford to my practice. Please find my assessment and plan below.        Assessment   Grade IV internal hemorrhoid

## (undated) NOTE — LETTER
10/16/24    Patient: Uli Vazquez  : 1945 Visit date: 10/16/2024    Dear  Geronimo Livingston MD    Thank you for referring Uli Vazquez to my practice.  Please find my assessment and plan below.    Assessment   1. Prolapsed internal hemorrhoids, grade 4        Plan     Successful treatment of internal hemorrhoid by rubber band ligation and phenol injection.    The care plan is discussed with the patient who voiced understanding.    Dietary, activity, and exercise recommendations were discussed with the patient.    The patient is encouraged to avoid straining, continue dietary fiber supplementation, stool softeners, and adequate hydration.    Follow-up in 2 weeks for continued treatment of internal hemorrhoids.    The patient was provided ample opportunity to ask questions.  All of the patient's questions were answered in detail.  The patient voiced understanding of the care plan.         Sincerely,       Herbert Skelton MD   CC: No Recipients

## (undated) NOTE — LETTER
24    Patient: Uli Vazquez  : 1945 Visit date: 2024    Dear  Geronimo Livingston MD    Thank you for referring Uli Vazquez to my practice.  Please find my assessment and plan below.    Assessment   1. Rectal bleeding        Plan     The patient is recovering nicely following treatment of internal hemorrhoids by rubber band ligation.    The anticipated postoperative recovery was discussed with the patient in detail.    Dietary, activity, and exercise recommendations along with restrictions were discussed with the patient during today's visit.    Wound care instructions were discussed during today's visit.    The patient will return to my attention on an as needed basis.    The patient is encouraged to continue seeing the primary care physician for ongoing medical needs.    The patient was given ample opportunity to ask questions.  The patient's questions were answered in detail and to the patient's satisfaction.  The patient voiced understanding of the postoperative care plan.           Sincerely,       Herbert Skelton MD   CC: No Recipients

## (undated) NOTE — LETTER
Patient Name: Uli Vazquez        : 1945       Medical Record #: [id number]    CONSENT FOR PROCEDURES/SEDATION    Date: 2024       Time: 1:45PM        1. I authorize the performance upon Uli Vazquez the following:    __________________________________________________________________________    2. I authorize Dr. Skelton (and whomever is designated as the doctor’s assistant), to perform the above mentioned procedures.    3. If any unforeseen conditions arise during this procedure calling for additional procedures, operations, or medications (including anesthesia and blood transfusion), I  further request and authorize the doctor to do whatever he/she deems advisable in my interest.    4. I consent to the taking and reproduction of any photographs in the course of this procedure for professional purposes.    5. I consent to the administration of such sedation as may be considered necessary or advisable by the physician responsible for this service, with the exception of  _____________________________.    6. I have been informed by my doctor of the nature and purpose of this procedure/sedation, possible alternative methods of treatment, risk involved and possible complications.      Signature of Patient:  ___________________________    Signature of person authorized to consent for patient: Relationship to patient:  ___________________________    ___________________    Witness: ____________________     Date: ______________    Provider: ____________________     Date: ______________

## (undated) NOTE — LETTER
01/10/24    Patient: Uli Vazquez  : 1945 Visit date: 1/10/2024    Dear  Geronimo Livingston MD    Thank you for referring Uli Vazquez to my practice.  Please find my assessment and plan below.     Assessment   1. Prolapsed internal hemorrhoids, grade 4    2. Rectal bleeding          Plan     The patient will be scheduled for stapled PPH hemorrhoidectomy.    The suzie-operative care plan was discussed with the patient, who voices understanding.  Activity and lifting recommendations were discussed in length.     The risks, benefits, and alternatives to the procedure were explained to the patient.  The risks explained include, but are not limited to, bleeding, infection, pain wound complications, recurrence, incorrect diagnosis, injury to adjacent organs and structures. We also discussed the possibile need for further therapeutic, diagnostic, or surgical intervention.  The patient voiced understanding, and after all questions were answered to the patient's satisfaction, the patient provided willing and informed consent to proceed.      Sincerely,       Herbert Skelton MD   CC:   No Recipients

## (undated) NOTE — LETTER
24    Patient: Uli Vazquez  : 1945 Visit date: 2024    Dear  Geronimo Livingston MD    Thank you for referring Uli Vazquez to my practice.  Please find my assessment and plan below.    Assessment   1. Prolapsed internal hemorrhoids, grade 4          Plan     The patient is recovering nicely following PPH stapled hemorrhoidectomy.    The anticipated postoperative recovery was discussed with the patient in detail.    Dietary, activity, and exercise recommendations along with restrictions were discussed with the patient during today's visit.    Wound care instructions were discussed during today's visit.    The patient will return to my attention on an as needed basis.    The patient is encouraged to continue seeing the primary care physician for ongoing medical needs.    The patient was given ample opportunity to ask questions.  The patient's questions were answered in detail and to the patient's satisfaction.  The patient voiced understanding of the postoperative care plan.             Sincerely,       Herbert Skelton MD   CC:   No Recipients

## (undated) NOTE — LETTER
Patient Name: Uli Vazquez        : 1945       Medical Record #: [id number]    CONSENT FOR PROCEDURES/SEDATION    Date: 10/16/2024       Time: 8:56 AM        1. I authorize the performance upon Uli Vazquez the following:    __________________________________________________________________________    2. I authorize DrIsael *** (and whomever is designated as the doctor’s assistant), to perform the above mentioned procedures.    3. If any unforeseen conditions arise during this procedure calling for additional procedures, operations, or medications (including anesthesia and blood transfusion), I  further request and authorize the doctor to do whatever he/she deems advisable in my interest.    4. I consent to the taking and reproduction of any photographs in the course of this procedure for professional purposes.    5. I consent to the administration of such sedation as may be considered necessary or advisable by the physician responsible for this service, with the exception of  _____________________________.    6. I have been informed by my doctor of the nature and purpose of this procedure/sedation, possible alternative methods of treatment, risk involved and possible complications.      Signature of Patient:  ___________________________    Signature of person authorized to consent for patient: Relationship to patient:  ___________________________    ___________________    Witness: ____________________     Date: ______________    Provider: ____________________     Date: ______________

## (undated) NOTE — LETTER
21    Patient: Tip Kohler  : 1945 Visit date: 2021    Dear  Mona Jackson MD    Thank you for referring Tip Kohler to my practice. Please find my assessment and plan below.         Assessment   Prolapsed internal hemorrhoids,